# Patient Record
Sex: FEMALE | Race: WHITE | Employment: OTHER | URBAN - METROPOLITAN AREA
[De-identification: names, ages, dates, MRNs, and addresses within clinical notes are randomized per-mention and may not be internally consistent; named-entity substitution may affect disease eponyms.]

---

## 2018-03-29 ENCOUNTER — APPOINTMENT (OUTPATIENT)
Dept: GENERAL RADIOLOGY | Age: 68
End: 2018-03-29
Payer: MEDICARE

## 2018-03-29 ENCOUNTER — HOSPITAL ENCOUNTER (EMERGENCY)
Age: 68
Discharge: HOME | End: 2018-03-30
Attending: EMERGENCY MEDICINE
Payer: MEDICARE

## 2018-03-29 VITALS
HEIGHT: 64 IN | RESPIRATION RATE: 14 BRPM | BODY MASS INDEX: 34.15 KG/M2 | DIASTOLIC BLOOD PRESSURE: 64 MMHG | HEART RATE: 84 BPM | OXYGEN SATURATION: 99 % | WEIGHT: 200 LBS | TEMPERATURE: 97.9 F | SYSTOLIC BLOOD PRESSURE: 119 MMHG

## 2018-03-29 DIAGNOSIS — R07.81 RIB PAIN ON RIGHT SIDE: ICD-10-CM

## 2018-03-29 DIAGNOSIS — M54.50 ACUTE RIGHT-SIDED LOW BACK PAIN WITHOUT SCIATICA: Primary | ICD-10-CM
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
## 2018-03-29 PROCEDURE — 71046 X-RAY EXAM CHEST 2 VIEWS: CPT

## 2018-03-29 PROCEDURE — 99284 EMERGENCY DEPT VISIT MOD MDM: CPT

## 2018-03-29 PROCEDURE — 6370000000 HC RX 637 (ALT 250 FOR IP): Performed by: NURSE PRACTITIONER

## 2018-03-29 RX ORDER — METHOCARBAMOL 500 MG/1
1000 TABLET, FILM COATED ORAL ONCE
Status: COMPLETED | OUTPATIENT
Start: 2018-03-29 | End: 2018-03-29

## 2018-03-29 RX ORDER — IBUPROFEN 600 MG/1
600 TABLET ORAL ONCE
Status: COMPLETED | OUTPATIENT
Start: 2018-03-29 | End: 2018-03-29

## 2018-03-29 RX ADMIN — METHOCARBAMOL 1000 MG: 500 TABLET ORAL at 23:35

## 2018-03-29 RX ADMIN — IBUPROFEN 600 MG: 600 TABLET ORAL at 23:12

## 2018-03-29 ASSESSMENT — PAIN DESCRIPTION - LOCATION: LOCATION: BACK

## 2018-03-29 ASSESSMENT — PAIN DESCRIPTION - ORIENTATION: ORIENTATION: RIGHT

## 2018-03-29 ASSESSMENT — PAIN SCALES - GENERAL: PAINLEVEL_OUTOF10: 10

## 2018-03-30 RX ORDER — METHOCARBAMOL 500 MG/1
500 TABLET, FILM COATED ORAL 3 TIMES DAILY
Qty: 15 TABLET | Refills: 0 | Status: SHIPPED | OUTPATIENT
Start: 2018-03-30 | End: 2018-04-04

## 2018-03-30 ASSESSMENT — PAIN DESCRIPTION - PAIN TYPE: TYPE: ACUTE PAIN

## 2018-03-30 ASSESSMENT — PAIN DESCRIPTION - ORIENTATION: ORIENTATION: RIGHT;LOWER

## 2018-03-30 ASSESSMENT — PAIN DESCRIPTION - LOCATION: LOCATION: BACK

## 2018-03-30 ASSESSMENT — PAIN DESCRIPTION - DESCRIPTORS: DESCRIPTORS: ACHING;NAGGING

## 2018-03-30 ASSESSMENT — PAIN SCALES - GENERAL: PAINLEVEL_OUTOF10: 4

## 2019-03-10 ENCOUNTER — HOSPITAL ENCOUNTER (EMERGENCY)
Age: 69
Discharge: HOME | End: 2019-03-10
Payer: MEDICARE

## 2019-03-10 VITALS
HEART RATE: 85 BPM | OXYGEN SATURATION: 97 % | DIASTOLIC BLOOD PRESSURE: 75 MMHG | WEIGHT: 195 LBS | TEMPERATURE: 98.1 F | BODY MASS INDEX: 33.29 KG/M2 | RESPIRATION RATE: 14 BRPM | SYSTOLIC BLOOD PRESSURE: 141 MMHG | HEIGHT: 64 IN
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
## 2019-03-10 PROCEDURE — 90715 TDAP VACCINE 7 YRS/> IM: CPT | Performed by: PHYSICIAN ASSISTANT

## 2019-03-10 PROCEDURE — 90471 IMMUNIZATION ADMIN: CPT | Performed by: PHYSICIAN ASSISTANT

## 2019-03-10 PROCEDURE — 99282 EMERGENCY DEPT VISIT SF MDM: CPT

## 2019-03-10 PROCEDURE — 6360000002 HC RX W HCPCS: Performed by: PHYSICIAN ASSISTANT

## 2019-03-10 RX ORDER — DIAPER,BRIEF,INFANT-TODD,DISP
EACH MISCELLANEOUS ONCE
Status: DISCONTINUED | OUTPATIENT
Start: 2019-03-10 | End: 2019-03-10 | Stop reason: HOSPADM

## 2019-03-10 RX ORDER — AMOXICILLIN AND CLAVULANATE POTASSIUM 875; 125 MG/1; MG/1
1 TABLET, FILM COATED ORAL 2 TIMES DAILY
Qty: 14 TABLET | Refills: 0 | Status: SHIPPED | OUTPATIENT
Start: 2019-03-10 | End: 2019-03-17

## 2019-03-10 RX ORDER — BACITRACIN ZINC AND POLYMYXIN B SULFATE 500; 1000 [USP'U]/G; [USP'U]/G
OINTMENT TOPICAL
Qty: 30 G | Refills: 0 | Status: SHIPPED | OUTPATIENT
Start: 2019-03-10 | End: 2019-03-17

## 2019-03-10 RX ADMIN — TETANUS TOXOID, REDUCED DIPHTHERIA TOXOID AND ACELLULAR PERTUSSIS VACCINE, ADSORBED 0.5 ML: 5; 2.5; 8; 8; 2.5 SUSPENSION INTRAMUSCULAR at 16:46

## 2019-05-14 ENCOUNTER — OFFICE VISIT (OUTPATIENT)
Dept: PODIATRY | Age: 69
End: 2019-05-14
Payer: MEDICARE

## 2019-05-14 VITALS
SYSTOLIC BLOOD PRESSURE: 125 MMHG | HEIGHT: 64 IN | TEMPERATURE: 96.3 F | WEIGHT: 205 LBS | DIASTOLIC BLOOD PRESSURE: 78 MMHG | BODY MASS INDEX: 35 KG/M2

## 2019-05-14 DIAGNOSIS — S86.309A PERONEAL TENDON INJURY, INITIAL ENCOUNTER: Primary | ICD-10-CM

## 2019-05-14 DIAGNOSIS — M79.671 PAIN IN RIGHT FOOT: ICD-10-CM

## 2019-05-14 PROCEDURE — 99213 OFFICE O/P EST LOW 20 MIN: CPT | Performed by: PODIATRIST

## 2019-05-14 PROCEDURE — L4360 PNEUMAT WALKING BOOT PRE CST: HCPCS | Performed by: PODIATRIST

## 2019-05-14 RX ORDER — PREDNISONE 10 MG/1
10 TABLET ORAL
Qty: 18 TABLET | Refills: 0 | Status: SHIPPED | OUTPATIENT
Start: 2019-05-14 | End: 2019-05-23

## 2019-05-14 NOTE — PROGRESS NOTES
19  Mariana Getting : 1950 Sex: female  Age: 77 y.o. Patient was referred by Hedy Abdul DO    Chief Complaint   Patient presents with    Foot Pain     right foot pain along fifth met       HPI: This patient is seen for right foot pain ongoing since January patient relates no trauma patient states the pain is there patient Shoots  on the outside of the foot patient has seen another podiatrist Marty May a cortisone shot with no relief    ROS:  Const: Denies constitutional symptoms  Musculo: Denies symptoms other than stated above  Skin: Denies symptoms other than stated above       Current Outpatient Medications:     predniSONE (DELTASONE) 10 MG tablet, Take 1 tablet by mouth 3 times daily (with meals) for 9 days 3 pills x 3 days  2pills a day x3  Days  1 pill  X 3 days, Disp: 18 tablet, Rfl: 0    aspirin 325 MG EC tablet, Take 325 mg by mouth daily, Disp: , Rfl:     Levothyroxine Sodium (SYNTHROID PO), Take 75 mg by mouth daily , Disp: , Rfl:     VERAPAMIL HCL PO, Take 80 mg by mouth , Disp: , Rfl:     PANTOPRAZOLE SODIUM PO, Take 40 mg by mouth , Disp: , Rfl:     SIMVASTATIN PO, Take 20 mg by mouth , Disp: , Rfl:   Allergies   Allergen Reactions    Erythromycin     Sulfa Antibiotics        Past Medical History:   Diagnosis Date    [facility]     Cerebral artery occlusion with cerebral infarction ([facility]     GERD (gastroesophageal reflux disease)     Hyperlipidemia     Hypertension     Thyroid disease      Past Surgical History:   Procedure Laterality Date    CAROTID ENDARTERECTOMY      HEMORRHOID SURGERY      HYSTERECTOMY      JOINT REPLACEMENT       No family history on file.   Social History     Socioeconomic History    Marital status:      Spouse name: Not on file    Number of children: Not on file    Years of education: Not on file    Highest education level: Not on file   Occupational History    Not on file   Social Needs    Financial resource strain: Not 2 weeks (around 5/28/2019). Cam Walker  Signed informed consent was obtained from the patient. Patient applied the cam walker to the affected limb. This device fit well. Patient was given written and verbal instructions regarding this cam walker. This is medically necessary to help reduce pain and promote and expedite healing. Patient placed in a Cam Walker for 3 weeks to patient prescribed prednisone for 9 days patient is to reappoint in 3 weeks patient reduce all activities  Seen By:  Fransico Reddy DPM      Document was created using voice recognition software. Note was reviewed, however may contain grammatical errors.

## 2019-05-30 ENCOUNTER — OFFICE VISIT (OUTPATIENT)
Dept: PODIATRY | Age: 69
End: 2019-05-30
Payer: MEDICARE

## 2019-05-30 VITALS
DIASTOLIC BLOOD PRESSURE: 78 MMHG | WEIGHT: 199 LBS | BODY MASS INDEX: 33.97 KG/M2 | TEMPERATURE: 97.5 F | SYSTOLIC BLOOD PRESSURE: 124 MMHG | HEIGHT: 64 IN

## 2019-05-30 DIAGNOSIS — M76.71 PERONEAL TENDINITIS OF RIGHT LOWER EXTREMITY: Primary | ICD-10-CM

## 2019-05-30 PROCEDURE — 99212 OFFICE O/P EST SF 10 MIN: CPT | Performed by: PODIATRIST

## 2019-05-30 NOTE — PROGRESS NOTES
19  Johnny Wu : 1950 Sex: female  Age: 77 y.o. Patient was referred by Jez Diaz DO    Chief Complaint   Patient presents with    Foot Pain     f/u perineal tendon right foot in cam walker since last visit 2019 she is doing great       HPI: Patient is seen for follow-up of right foot patient has been walking boot for approximately 2 weeks patient is having no difficulty of pain. ROS:  Const: Denies constitutional symptoms  Musculo: Denies symptoms other than stated above  Skin: Denies symptoms other than stated above       Current Outpatient Medications:     aspirin 325 MG EC tablet, Take 325 mg by mouth daily, Disp: , Rfl:     Levothyroxine Sodium (SYNTHROID PO), Take 75 mg by mouth daily , Disp: , Rfl:     VERAPAMIL HCL PO, Take 80 mg by mouth , Disp: , Rfl:     PANTOPRAZOLE SODIUM PO, Take 40 mg by mouth , Disp: , Rfl:     SIMVASTATIN PO, Take 20 mg by mouth , Disp: , Rfl:   Allergies   Allergen Reactions    Erythromycin     Sulfa Antibiotics        Past Medical History:   Diagnosis Date    [facility]     [facility]erebral [facility] occlusion [facility] infarction [facility]     GERD (gastroesophageal reflux disease)     Hyperlipidemia     Hypertension     Thyroid disease      Past Surgical History:   Procedure Laterality Date    CAROTID ENDARTERECTOMY      HEMORRHOID SURGERY      HYSTERECTOMY      JOINT REPLACEMENT       No family history on file.   Social History     Socioeconomic History    Marital status:      Spouse name: Not on file    Number of children: Not on file    Years of education: Not on file    Highest education level: Not on file   Occupational History    Not on file   Social Needs    Financial resource strain: Not on file    Food insecurity:     Worry: Not on file     Inability: Not on file    Transportation needs:     Medical: Not on file     Non-medical: Not on file   Tobacco Use    Smoking status: Former Smoker    Smokeless tobacco: Never Used   Substance and Sexual Activity    Alcohol use: Yes    Drug use: No    Sexual activity: Not on file   Lifestyle    Physical activity:     Days per week: Not on file     Minutes per session: Not on file    Stress: Not on file   Relationships    Social connections:     Talks on phone: Not on file     Gets together: Not on file     Attends Oriental orthodox service: Not on file     Active member of club or organization: Not on file     Attends meetings of clubs or organizations: Not on file     Relationship status: Not on file    Intimate partner violence:     Fear of current or ex partner: Not on file     Emotionally abused: Not on file     Physically abused: Not on file     Forced sexual activity: Not on file   Other Topics Concern    Not on file   Social History Narrative    Not on file       Vitals:    05/30/19 1515   BP: 124/78   Temp: 97.5 °F (36.4 °C)   TempSrc: Tympanic   Weight: 199 lb (90.3 kg)   Height: 5' 4\" (1.626 m)       Focused Lower Extremity Physical Exam:  Vitals:    05/30/19 1515   BP: 124/78   Temp: 97.5 °F (36.4 °C)        Foot Exam    General  General Appearance: appears stated age and healthy   Orientation: alert and oriented to person, place, and time       Right Foot/Ankle     Inspection and Palpation  Tenderness: none   Swelling: none   Arch: normal             Ortho Exam    Vascular: pulses  dp  pt    Capillary Refill Time:   Hair growth  Skin:    Edema:    Neurologic:      Musculoskeletal/ Orthopedic examination:  There is negative pain with palpation range of motion inversion eversion dorsiflexion plantar flexion or ambulation of the right foot peroneal tendons or 5th metatarsal  NAIL   Web space   Derm:          Assessment and Plan:  Charo Bazan was seen today for foot pain. Diagnoses and all orders for this visit:    Peroneal tendinitis of right lower extremity        Return if symptoms worsen or fail to improve.     Patient is to continue with ambulating a good

## 2019-08-06 ENCOUNTER — OFFICE VISIT (OUTPATIENT)
Dept: FAMILY MEDICINE CLINIC | Age: 69
End: 2019-08-06
Payer: MEDICARE

## 2019-08-06 VITALS
WEIGHT: 188.13 LBS | HEIGHT: 64 IN | TEMPERATURE: 97.7 F | DIASTOLIC BLOOD PRESSURE: 86 MMHG | BODY MASS INDEX: 32.12 KG/M2 | OXYGEN SATURATION: 99 % | SYSTOLIC BLOOD PRESSURE: 138 MMHG | HEART RATE: 70 BPM

## 2019-08-06 DIAGNOSIS — R73.01 IMPAIRED FASTING BLOOD SUGAR: ICD-10-CM

## 2019-08-06 DIAGNOSIS — K21.9 GASTROESOPHAGEAL REFLUX DISEASE WITHOUT ESOPHAGITIS: ICD-10-CM

## 2019-08-06 DIAGNOSIS — E78.2 MIXED HYPERLIPIDEMIA: Primary | ICD-10-CM

## 2019-08-06 DIAGNOSIS — E03.9 ACQUIRED HYPOTHYROIDISM: ICD-10-CM

## 2019-08-06 DIAGNOSIS — E55.9 VITAMIN D DEFICIENCY: ICD-10-CM

## 2019-08-06 DIAGNOSIS — G44.211 INTRACTABLE EPISODIC TENSION-TYPE HEADACHE: ICD-10-CM

## 2019-08-06 PROBLEM — R51.9 HEADACHE: Status: ACTIVE | Noted: 2019-08-06

## 2019-08-06 PROCEDURE — 99214 OFFICE O/P EST MOD 30 MIN: CPT | Performed by: FAMILY MEDICINE

## 2019-08-06 RX ORDER — LEVOTHYROXINE SODIUM 0.07 MG/1
75 TABLET ORAL DAILY
Qty: 90 TABLET | Refills: 0 | Status: SHIPPED | OUTPATIENT
Start: 2019-08-06 | End: 2019-09-25 | Stop reason: SDUPTHER

## 2019-08-06 RX ORDER — METHYLPREDNISOLONE 4 MG/1
TABLET ORAL
Qty: 21 TABLET | Refills: 2 | Status: SHIPPED | OUTPATIENT
Start: 2019-08-06 | End: 2019-08-07 | Stop reason: SDUPTHER

## 2019-08-06 RX ORDER — TRIAMCINOLONE ACETONIDE 1 MG/G
CREAM TOPICAL
COMMUNITY
Start: 2019-07-23 | End: 2020-07-22

## 2019-08-06 SDOH — HEALTH STABILITY: MENTAL HEALTH: HOW OFTEN DO YOU HAVE A DRINK CONTAINING ALCOHOL?: 2-4 TIMES A MONTH

## 2019-08-06 SDOH — HEALTH STABILITY: MENTAL HEALTH: HOW MANY STANDARD DRINKS CONTAINING ALCOHOL DO YOU HAVE ON A TYPICAL DAY?: 1 OR 2

## 2019-08-06 ASSESSMENT — ENCOUNTER SYMPTOMS
SORE THROAT: 0
CONSTIPATION: 0
COUGH: 0
SINUS PAIN: 0
NAUSEA: 0
EYE PAIN: 0
CHEST TIGHTNESS: 0
VOMITING: 0
ABDOMINAL PAIN: 0
SHORTNESS OF BREATH: 0
WHEEZING: 0
RHINORRHEA: 1
DIARRHEA: 0
SINUS PRESSURE: 1
TROUBLE SWALLOWING: 0
BACK PAIN: 0

## 2019-08-06 ASSESSMENT — PATIENT HEALTH QUESTIONNAIRE - PHQ9
SUM OF ALL RESPONSES TO PHQ9 QUESTIONS 1 & 2: 0
SUM OF ALL RESPONSES TO PHQ QUESTIONS 1-9: 0
SUM OF ALL RESPONSES TO PHQ QUESTIONS 1-9: 0
1. LITTLE INTEREST OR PLEASURE IN DOING THINGS: 0
2. FEELING DOWN, DEPRESSED OR HOPELESS: 0

## 2019-08-06 NOTE — PROGRESS NOTES
19    Name: Denae Villareal  [facility]   Sex:female   Age:68 y.o. Chief Complaint   Patient presents with    Headache     for the past 5 days     Patient here for headaches    She did not go to the ER last week, the headaches has been a litle manageable  Always starts in the back of her head  She has seen headache specialist, got occipital injections in the past but would like to see someone locally if there is someone else that does them    Prednisone gets rid of then usually with in 48 to 72 hrs at the latest    Sometimes they start in paranasal sinuses but never really has a true sinus infection    Review of Systems   Constitutional: Negative for appetite change, fatigue and fever. HENT: Positive for rhinorrhea and sinus pressure. Negative for congestion, ear pain, sinus pain, sore throat and trouble swallowing. Eyes: Negative for pain. Respiratory: Negative for cough, chest tightness, shortness of breath and wheezing. Cardiovascular: Negative for chest pain, palpitations and leg swelling. Gastrointestinal: Negative for abdominal pain, constipation, diarrhea, nausea and vomiting. Endocrine: Negative for cold intolerance and heat intolerance. Genitourinary: Negative for difficulty urinating, hematuria and pelvic pain. Musculoskeletal: Negative for back pain, gait problem and joint swelling. Skin: Negative for rash and wound. Neurological: Positive for numbness and headaches. Negative for dizziness and syncope. Hematological: Negative for adenopathy. Psychiatric/Behavioral: Negative for confusion, sleep disturbance and suicidal ideas.            Current Outpatient Medications:     Calcium Carb-Cholecalciferol (CALCIUM-VITAMIN D) 500-200 MG-UNIT per tablet, Take 1 tablet by mouth 2 times daily (with meals), Disp: , Rfl:     levothyroxine (SYNTHROID) 75 MCG tablet, Take 1 tablet by mouth daily, Disp: 90 tablet, Rfl: 0    methylPREDNISolone (MEDROL DOSEPACK) 4 MG tablet, Take by mouth as directed in package, Disp: 21 tablet, Rfl: 2    triamcinolone (KENALOG) 0.1 % cream, , Disp: , Rfl:     aspirin 325 MG EC tablet, Take 325 mg by mouth daily, Disp: , Rfl:     VERAPAMIL HCL PO, Take 80 mg by mouth , Disp: , Rfl:     PANTOPRAZOLE SODIUM PO, Take 40 mg by mouth , Disp: , Rfl:     SIMVASTATIN PO, Take 20 mg by mouth , Disp: , Rfl:   Allergies   Allergen Reactions    Erythromycin     Sulfa Antibiotics       Past Medical History:   Diagnosis Date    Aneurysm ([address]     Aneurysm ([address]     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, with stent in left internal  carotid artery    Cerebral artery occlusion with cerebral infarction ([address]     GERD (gastroesophageal reflux disease)     Headache     Hyperlipidemia     Hypertension     Hypothyroidism     Osteoporosis     Thyroid disease      Patient Active Problem List    Diagnosis Date Noted    Headache 08/06/2019    Hypothyroidism 08/06/2019    GERD (gastroesophageal reflux disease) 08/06/2019    Peroneal tendinitis of right lower extremity 05/30/2019    Pain in right foot 05/14/2019      Past Surgical History:   Procedure Laterality Date    CAROTID ENDARTERECTOMY      HEMORRHOID SURGERY      HYSTERECTOMY      JOINT REPLACEMENT      KNEE ARTHROPLASTY Bilateral 2010    left sided stroke afterward, residual rt side weakness      Social History     Tobacco History     Smoking Status  Former Smoker Quit date  1/1/1981 Smoking Frequency  0.75 packs/day for 10 years (7.5 pk yrs) Smoking Tobacco Type  Cigarettes    Smokeless Tobacco Use  Never Used          Alcohol History     Alcohol Use Status  Yes          Drug Use     Drug Use Status  No          Sexual Activity     Sexually Active  Not Asked                  /86   Pulse 70   Temp 97.7 °F (36.5 °C)   Ht 5' 4\" (1.626 m)   Wt 188 lb 2 oz (85.3 kg)   SpO2 99%   BMI 32.29 kg/m²     EXAM:   Physical Exam   Constitutional: She appears well-developed and well-nourished.    HENT:   Head: Normocephalic

## 2019-08-07 DIAGNOSIS — G44.211 INTRACTABLE EPISODIC TENSION-TYPE HEADACHE: ICD-10-CM

## 2019-08-07 RX ORDER — METHYLPREDNISOLONE 4 MG/1
TABLET ORAL
Qty: 21 TABLET | Refills: 2 | Status: SHIPPED | OUTPATIENT
Start: 2019-08-07 | End: 2019-08-13

## 2019-09-20 ENCOUNTER — HOSPITAL ENCOUNTER (OUTPATIENT)
Age: 69
Discharge: HOME | End: 2019-09-22
Payer: MEDICARE

## 2019-09-20 DIAGNOSIS — E55.9 VITAMIN D DEFICIENCY: ICD-10-CM

## 2019-09-20 DIAGNOSIS — E78.2 MIXED HYPERLIPIDEMIA: ICD-10-CM

## 2019-09-20 DIAGNOSIS — R73.01 IMPAIRED FASTING BLOOD SUGAR: ICD-10-CM

## 2019-09-20 DIAGNOSIS — E03.9 ACQUIRED HYPOTHYROIDISM: ICD-10-CM

## 2019-09-20 LAB
ALBUMIN SERPL-MCNC: 3.9 G/DL (ref 3.5–5.2)
ALP BLD-CCNC: 80 U/L (ref 35–104)
ALT SERPL-CCNC: 16 U/L (ref 0–32)
ANION GAP SERPL CALCULATED.3IONS-SCNC: 13 MMOL/L (ref 7–16)
AST SERPL-CCNC: 20 U/L (ref 0–31)
BASOPHILS ABSOLUTE: 0.02 E9/L (ref 0–0.2)
BASOPHILS RELATIVE PERCENT: 0.5 % (ref 0–2)
BILIRUB SERPL-MCNC: 0.4 MG/DL (ref 0–1.2)
BUN BLDV-MCNC: 19 MG/DL (ref 8–23)
CALCIUM SERPL-MCNC: 9.3 MG/DL (ref 8.6–10.2)
CHLORIDE BLD-SCNC: 106 MMOL/L (ref 98–107)
CHOLESTEROL, TOTAL: 145 MG/DL (ref 0–199)
CO2: 25 MMOL/L (ref 22–29)
CREAT SERPL-MCNC: 0.8 MG/DL (ref 0.5–1)
EOSINOPHILS ABSOLUTE: 0.08 E9/L (ref 0.05–0.5)
EOSINOPHILS RELATIVE PERCENT: 2.1 % (ref 0–6)
GFR AFRICAN AMERICAN: >60
GFR NON-AFRICAN AMERICAN: >60 ML/MIN/1.73
GLUCOSE BLD-MCNC: 98 MG/DL (ref 74–99)
HBA1C MFR BLD: 5.3 % (ref 4–5.6)
HCT VFR BLD CALC: 43.6 % (ref 34–48)
HDLC SERPL-MCNC: 52 MG/DL
HEMOGLOBIN: 13.8 G/DL (ref 11.5–15.5)
IMMATURE GRANULOCYTES #: 0.01 E9/L
IMMATURE GRANULOCYTES %: 0.3 % (ref 0–5)
LDL CHOLESTEROL CALCULATED: 82 MG/DL (ref 0–99)
LYMPHOCYTES ABSOLUTE: 1.49 E9/L (ref 1.5–4)
LYMPHOCYTES RELATIVE PERCENT: 38.3 % (ref 20–42)
MCH RBC QN AUTO: 30.4 PG (ref 26–35)
MCHC RBC AUTO-ENTMCNC: 31.7 % (ref 32–34.5)
MCV RBC AUTO: 96 FL (ref 80–99.9)
MONOCYTES ABSOLUTE: 0.28 E9/L (ref 0.1–0.95)
MONOCYTES RELATIVE PERCENT: 7.2 % (ref 2–12)
NEUTROPHILS ABSOLUTE: 2.01 E9/L (ref 1.8–7.3)
NEUTROPHILS RELATIVE PERCENT: 51.6 % (ref 43–80)
PDW BLD-RTO: 13.4 FL (ref 11.5–15)
PLATELET # BLD: 159 E9/L (ref 130–450)
PMV BLD AUTO: 11.4 FL (ref 7–12)
POTASSIUM SERPL-SCNC: 4.4 MMOL/L (ref 3.5–5)
RBC # BLD: 4.54 E12/L (ref 3.5–5.5)
SODIUM BLD-SCNC: 144 MMOL/L (ref 132–146)
T4 FREE: 1.28 NG/DL (ref 0.93–1.7)
TOTAL PROTEIN: 6.2 G/DL (ref 6.4–8.3)
TRIGL SERPL-MCNC: 56 MG/DL (ref 0–149)
TSH SERPL DL<=0.05 MIU/L-ACNC: 2.6 UIU/ML (ref 0.27–4.2)
VITAMIN D 25-HYDROXY: 23 NG/ML (ref 30–100)
VLDLC SERPL CALC-MCNC: 11 MG/DL
WBC # BLD: 3.9 E9/L (ref 4.5–11.5)

## 2019-09-20 PROCEDURE — 83036 HEMOGLOBIN GLYCOSYLATED A1C: CPT

## 2019-09-20 PROCEDURE — 84439 ASSAY OF FREE THYROXINE: CPT

## 2019-09-20 PROCEDURE — 82306 VITAMIN D 25 HYDROXY: CPT

## 2019-09-20 PROCEDURE — 84443 ASSAY THYROID STIM HORMONE: CPT

## 2019-09-20 PROCEDURE — 80053 COMPREHEN METABOLIC PANEL: CPT

## 2019-09-20 PROCEDURE — 36415 COLL VENOUS BLD VENIPUNCTURE: CPT

## 2019-09-20 PROCEDURE — 85025 COMPLETE CBC W/AUTO DIFF WBC: CPT

## 2019-09-20 PROCEDURE — 80061 LIPID PANEL: CPT

## 2019-09-25 ENCOUNTER — OFFICE VISIT (OUTPATIENT)
Dept: FAMILY MEDICINE CLINIC | Age: 69
End: 2019-09-25
Payer: MEDICARE

## 2019-09-25 VITALS
TEMPERATURE: 97.4 F | HEIGHT: 64 IN | HEART RATE: 84 BPM | OXYGEN SATURATION: 99 % | DIASTOLIC BLOOD PRESSURE: 86 MMHG | BODY MASS INDEX: 31.27 KG/M2 | SYSTOLIC BLOOD PRESSURE: 126 MMHG | WEIGHT: 183.13 LBS

## 2019-09-25 DIAGNOSIS — I10 ESSENTIAL HYPERTENSION: Primary | ICD-10-CM

## 2019-09-25 DIAGNOSIS — E03.9 ACQUIRED HYPOTHYROIDISM: ICD-10-CM

## 2019-09-25 DIAGNOSIS — E78.2 MIXED HYPERLIPIDEMIA: ICD-10-CM

## 2019-09-25 DIAGNOSIS — Z23 IMMUNIZATION DUE: ICD-10-CM

## 2019-09-25 DIAGNOSIS — M75.21 BICEPS TENDINITIS OF RIGHT UPPER EXTREMITY: ICD-10-CM

## 2019-09-25 DIAGNOSIS — K21.9 GASTROESOPHAGEAL REFLUX DISEASE WITHOUT ESOPHAGITIS: ICD-10-CM

## 2019-09-25 DIAGNOSIS — R73.01 IMPAIRED FASTING BLOOD SUGAR: ICD-10-CM

## 2019-09-25 PROBLEM — E78.5 HYPERLIPIDEMIA: Status: ACTIVE | Noted: 2019-09-25

## 2019-09-25 PROCEDURE — G0008 ADMIN INFLUENZA VIRUS VAC: HCPCS | Performed by: FAMILY MEDICINE

## 2019-09-25 PROCEDURE — 99214 OFFICE O/P EST MOD 30 MIN: CPT | Performed by: FAMILY MEDICINE

## 2019-09-25 PROCEDURE — 90653 IIV ADJUVANT VACCINE IM: CPT | Performed by: FAMILY MEDICINE

## 2019-09-25 RX ORDER — LEVOTHYROXINE SODIUM 0.07 MG/1
75 TABLET ORAL DAILY
Qty: 90 TABLET | Refills: 1 | Status: SHIPPED | OUTPATIENT
Start: 2019-09-25 | End: 2020-01-27 | Stop reason: SDUPTHER

## 2019-09-25 RX ORDER — SIMVASTATIN 20 MG
20 TABLET ORAL DAILY
Qty: 90 TABLET | Refills: 1 | Status: SHIPPED
Start: 2019-09-25 | End: 2020-04-06 | Stop reason: SDUPTHER

## 2019-09-25 RX ORDER — VERAPAMIL HYDROCHLORIDE 80 MG/1
80 TABLET ORAL DAILY
COMMUNITY
End: 2019-09-25 | Stop reason: SDUPTHER

## 2019-09-25 RX ORDER — VERAPAMIL HYDROCHLORIDE 80 MG/1
80 TABLET ORAL DAILY
Qty: 90 TABLET | Refills: 1 | Status: SHIPPED | OUTPATIENT
Start: 2019-09-25 | End: 2019-09-25 | Stop reason: SDUPTHER

## 2019-09-25 RX ORDER — PANTOPRAZOLE SODIUM 40 MG/1
40 TABLET, DELAYED RELEASE ORAL DAILY
Qty: 90 TABLET | Refills: 1 | Status: SHIPPED
Start: 2019-09-25 | End: 2020-04-06 | Stop reason: SDUPTHER

## 2019-09-25 RX ORDER — PANTOPRAZOLE SODIUM 40 MG/1
1 TABLET, DELAYED RELEASE ORAL DAILY
COMMUNITY
Start: 2019-08-11 | End: 2019-09-25 | Stop reason: SDUPTHER

## 2019-09-25 RX ORDER — SIMVASTATIN 20 MG
1 TABLET ORAL DAILY
COMMUNITY
Start: 2019-08-19 | End: 2019-09-25 | Stop reason: SDUPTHER

## 2019-09-25 RX ORDER — VERAPAMIL HYDROCHLORIDE 80 MG/1
80 TABLET ORAL 2 TIMES DAILY
Qty: 180 TABLET | Refills: 1 | Status: SHIPPED
Start: 2019-09-25 | End: 2020-07-22 | Stop reason: SDUPTHER

## 2019-09-25 RX ORDER — PREDNISONE 10 MG/1
TABLET ORAL
Qty: 30 TABLET | Refills: 0 | Status: SHIPPED | OUTPATIENT
Start: 2019-09-25 | End: 2019-10-09

## 2019-09-25 ASSESSMENT — ENCOUNTER SYMPTOMS
SINUS PAIN: 0
CONSTIPATION: 0
ABDOMINAL PAIN: 0
VOMITING: 0
SHORTNESS OF BREATH: 0
SORE THROAT: 0
WHEEZING: 0
DIARRHEA: 0
TROUBLE SWALLOWING: 0
COUGH: 0
NAUSEA: 0
CHEST TIGHTNESS: 0
BACK PAIN: 0
EYE PAIN: 0

## 2019-10-09 ENCOUNTER — OFFICE VISIT (OUTPATIENT)
Dept: FAMILY MEDICINE CLINIC | Age: 69
End: 2019-10-09
Payer: MEDICARE

## 2019-10-09 VITALS
SYSTOLIC BLOOD PRESSURE: 122 MMHG | BODY MASS INDEX: 32.69 KG/M2 | TEMPERATURE: 98.7 F | HEIGHT: 64 IN | DIASTOLIC BLOOD PRESSURE: 74 MMHG | HEART RATE: 66 BPM | OXYGEN SATURATION: 98 % | WEIGHT: 191.5 LBS

## 2019-10-09 DIAGNOSIS — M79.601 RIGHT ARM PAIN: Primary | ICD-10-CM

## 2019-10-09 DIAGNOSIS — M65.221 CALCIFIC TENDINITIS, RIGHT UPPER ARM: ICD-10-CM

## 2019-10-09 PROCEDURE — 99213 OFFICE O/P EST LOW 20 MIN: CPT | Performed by: FAMILY MEDICINE

## 2019-10-09 ASSESSMENT — ENCOUNTER SYMPTOMS
CHEST TIGHTNESS: 0
VOMITING: 0
COUGH: 0
TROUBLE SWALLOWING: 0
SHORTNESS OF BREATH: 0
SINUS PAIN: 0
BACK PAIN: 0
EYE PAIN: 0
SORE THROAT: 0
DIARRHEA: 0
NAUSEA: 0
ABDOMINAL PAIN: 0
WHEEZING: 0
CONSTIPATION: 0

## 2019-10-29 ENCOUNTER — NURSE ONLY (OUTPATIENT)
Dept: FAMILY MEDICINE CLINIC | Age: 69
End: 2019-10-29
Payer: MEDICARE

## 2019-10-29 DIAGNOSIS — Z23 NEED FOR VACCINATION: Primary | ICD-10-CM

## 2019-10-29 PROCEDURE — G0009 ADMIN PNEUMOCOCCAL VACCINE: HCPCS | Performed by: FAMILY MEDICINE

## 2019-10-29 PROCEDURE — 90670 PCV13 VACCINE IM: CPT | Performed by: FAMILY MEDICINE

## 2019-12-03 ENCOUNTER — OFFICE VISIT (OUTPATIENT)
Dept: ORTHOPEDIC SURGERY | Age: 69
End: 2019-12-03
Payer: MEDICARE

## 2019-12-03 VITALS
HEART RATE: 91 BPM | SYSTOLIC BLOOD PRESSURE: 128 MMHG | HEIGHT: 64 IN | DIASTOLIC BLOOD PRESSURE: 82 MMHG | WEIGHT: 179 LBS | BODY MASS INDEX: 30.56 KG/M2

## 2019-12-03 DIAGNOSIS — M79.601 RIGHT ARM PAIN: Primary | ICD-10-CM

## 2019-12-03 PROCEDURE — 99203 OFFICE O/P NEW LOW 30 MIN: CPT | Performed by: ORTHOPAEDIC SURGERY

## 2020-01-27 RX ORDER — LEVOTHYROXINE SODIUM 0.07 MG/1
75 TABLET ORAL DAILY
Qty: 90 TABLET | Refills: 0 | Status: SHIPPED
Start: 2020-01-27 | End: 2020-04-06 | Stop reason: SDUPTHER

## 2020-03-19 PROBLEM — E55.9 VITAMIN D DEFICIENCY: Status: ACTIVE | Noted: 2020-03-19

## 2020-04-02 ENCOUNTER — TELEPHONE (OUTPATIENT)
Dept: FAMILY MEDICINE CLINIC | Age: 70
End: 2020-04-02

## 2020-04-06 RX ORDER — SIMVASTATIN 20 MG
20 TABLET ORAL DAILY
Qty: 90 TABLET | Refills: 0 | Status: SHIPPED
Start: 2020-04-06 | End: 2020-07-22 | Stop reason: SDUPTHER

## 2020-04-06 RX ORDER — LEVOTHYROXINE SODIUM 0.07 MG/1
75 TABLET ORAL DAILY
Qty: 90 TABLET | Refills: 0 | Status: SHIPPED
Start: 2020-04-06 | End: 2020-07-22 | Stop reason: SDUPTHER

## 2020-04-06 RX ORDER — PANTOPRAZOLE SODIUM 40 MG/1
40 TABLET, DELAYED RELEASE ORAL DAILY
Qty: 90 TABLET | Refills: 0 | Status: SHIPPED
Start: 2020-04-06 | End: 2020-07-22 | Stop reason: SDUPTHER

## 2020-05-07 ENCOUNTER — TELEPHONE (OUTPATIENT)
Dept: PRIMARY CARE CLINIC | Age: 70
End: 2020-05-07

## 2020-06-02 ENCOUNTER — TELEPHONE (OUTPATIENT)
Dept: FAMILY MEDICINE CLINIC | Age: 70
End: 2020-06-02

## 2020-06-02 RX ORDER — PREDNISONE 20 MG/1
20 TABLET ORAL 2 TIMES DAILY
Qty: 10 TABLET | Refills: 0 | Status: SHIPPED
Start: 2020-06-02 | End: 2021-03-11 | Stop reason: SDUPTHER

## 2020-06-19 RX ORDER — DIVALPROEX SODIUM 250 MG/1
TABLET, DELAYED RELEASE ORAL
COMMUNITY
Start: 2020-06-10 | End: 2020-07-22

## 2020-07-22 ENCOUNTER — HOSPITAL ENCOUNTER (OUTPATIENT)
Age: 70
Discharge: HOME | End: 2020-07-24
Payer: MEDICARE

## 2020-07-22 ENCOUNTER — OFFICE VISIT (OUTPATIENT)
Dept: FAMILY MEDICINE CLINIC | Age: 70
End: 2020-07-22
Payer: MEDICARE

## 2020-07-22 VITALS
OXYGEN SATURATION: 98 % | SYSTOLIC BLOOD PRESSURE: 118 MMHG | TEMPERATURE: 97.8 F | HEART RATE: 74 BPM | DIASTOLIC BLOOD PRESSURE: 78 MMHG | BODY MASS INDEX: 32.64 KG/M2 | HEIGHT: 64 IN | WEIGHT: 191.2 LBS

## 2020-07-22 LAB
ALBUMIN SERPL-MCNC: 4.4 G/DL (ref 3.5–5.2)
ALP BLD-CCNC: 82 U/L (ref 35–104)
ALT SERPL-CCNC: 13 U/L (ref 0–32)
ANION GAP SERPL CALCULATED.3IONS-SCNC: 12 MMOL/L (ref 7–16)
AST SERPL-CCNC: 19 U/L (ref 0–31)
BASOPHILS ABSOLUTE: 0.04 E9/L (ref 0–0.2)
BASOPHILS RELATIVE PERCENT: 1 % (ref 0–2)
BILIRUB SERPL-MCNC: 0.5 MG/DL (ref 0–1.2)
BUN BLDV-MCNC: 18 MG/DL (ref 8–23)
CALCIUM SERPL-MCNC: 9.8 MG/DL (ref 8.6–10.2)
CHLORIDE BLD-SCNC: 104 MMOL/L (ref 98–107)
CHOLESTEROL, TOTAL: 152 MG/DL (ref 0–199)
CO2: 27 MMOL/L (ref 22–29)
CREAT SERPL-MCNC: 0.8 MG/DL (ref 0.5–1)
EOSINOPHILS ABSOLUTE: 0.06 E9/L (ref 0.05–0.5)
EOSINOPHILS RELATIVE PERCENT: 1.5 % (ref 0–6)
GFR AFRICAN AMERICAN: >60
GFR NON-AFRICAN AMERICAN: >60 ML/MIN/1.73
GLUCOSE BLD-MCNC: 101 MG/DL (ref 74–99)
HBA1C MFR BLD: 5.4 % (ref 4–5.6)
HCT VFR BLD CALC: 45.6 % (ref 34–48)
HDLC SERPL-MCNC: 61 MG/DL
HEMOGLOBIN: 14.2 G/DL (ref 11.5–15.5)
IMMATURE GRANULOCYTES #: 0.01 E9/L
IMMATURE GRANULOCYTES %: 0.2 % (ref 0–5)
LDL CHOLESTEROL CALCULATED: 77 MG/DL (ref 0–99)
LYMPHOCYTES ABSOLUTE: 1.72 E9/L (ref 1.5–4)
LYMPHOCYTES RELATIVE PERCENT: 42.3 % (ref 20–42)
MCH RBC QN AUTO: 29.2 PG (ref 26–35)
MCHC RBC AUTO-ENTMCNC: 31.1 % (ref 32–34.5)
MCV RBC AUTO: 93.8 FL (ref 80–99.9)
MONOCYTES ABSOLUTE: 0.32 E9/L (ref 0.1–0.95)
MONOCYTES RELATIVE PERCENT: 7.9 % (ref 2–12)
NEUTROPHILS ABSOLUTE: 1.92 E9/L (ref 1.8–7.3)
NEUTROPHILS RELATIVE PERCENT: 47.1 % (ref 43–80)
PDW BLD-RTO: 13 FL (ref 11.5–15)
PLATELET # BLD: 101 E9/L (ref 130–450)
PMV BLD AUTO: 11.1 FL (ref 7–12)
POTASSIUM SERPL-SCNC: 4.3 MMOL/L (ref 3.5–5)
RBC # BLD: 4.86 E12/L (ref 3.5–5.5)
SODIUM BLD-SCNC: 143 MMOL/L (ref 132–146)
T4 FREE: 1.21 NG/DL (ref 0.93–1.7)
TOTAL PROTEIN: 6.7 G/DL (ref 6.4–8.3)
TRIGL SERPL-MCNC: 72 MG/DL (ref 0–149)
TSH SERPL DL<=0.05 MIU/L-ACNC: 3.45 UIU/ML (ref 0.27–4.2)
VLDLC SERPL CALC-MCNC: 14 MG/DL
WBC # BLD: 4.1 E9/L (ref 4.5–11.5)

## 2020-07-22 PROCEDURE — G8510 SCR DEP NEG, NO PLAN REQD: HCPCS | Performed by: FAMILY MEDICINE

## 2020-07-22 PROCEDURE — 80061 LIPID PANEL: CPT

## 2020-07-22 PROCEDURE — 36415 COLL VENOUS BLD VENIPUNCTURE: CPT

## 2020-07-22 PROCEDURE — 83036 HEMOGLOBIN GLYCOSYLATED A1C: CPT

## 2020-07-22 PROCEDURE — 80053 COMPREHEN METABOLIC PANEL: CPT

## 2020-07-22 PROCEDURE — 84443 ASSAY THYROID STIM HORMONE: CPT

## 2020-07-22 PROCEDURE — 85025 COMPLETE CBC W/AUTO DIFF WBC: CPT

## 2020-07-22 PROCEDURE — 99214 OFFICE O/P EST MOD 30 MIN: CPT | Performed by: FAMILY MEDICINE

## 2020-07-22 PROCEDURE — 84439 ASSAY OF FREE THYROXINE: CPT

## 2020-07-22 RX ORDER — PANTOPRAZOLE SODIUM 40 MG/1
40 TABLET, DELAYED RELEASE ORAL DAILY
Qty: 90 TABLET | Refills: 0 | Status: SHIPPED
Start: 2020-07-22 | End: 2020-09-28 | Stop reason: SDUPTHER

## 2020-07-22 RX ORDER — SIMVASTATIN 20 MG
20 TABLET ORAL DAILY
Qty: 90 TABLET | Refills: 0 | Status: SHIPPED
Start: 2020-07-22 | End: 2020-09-28 | Stop reason: SDUPTHER

## 2020-07-22 RX ORDER — VERAPAMIL HYDROCHLORIDE 80 MG/1
80 TABLET ORAL 2 TIMES DAILY
Qty: 180 TABLET | Refills: 1 | Status: SHIPPED
Start: 2020-07-22 | End: 2020-12-04 | Stop reason: SDUPTHER

## 2020-07-22 RX ORDER — LEVOTHYROXINE SODIUM 0.07 MG/1
75 TABLET ORAL DAILY
Qty: 90 TABLET | Refills: 0 | Status: SHIPPED
Start: 2020-07-22 | End: 2020-09-28 | Stop reason: SDUPTHER

## 2020-07-22 ASSESSMENT — ENCOUNTER SYMPTOMS
EYE PAIN: 0
CHEST TIGHTNESS: 0
SHORTNESS OF BREATH: 0
NAUSEA: 0
SINUS PAIN: 0
WHEEZING: 0
VOMITING: 0
TROUBLE SWALLOWING: 0
ABDOMINAL PAIN: 0
BACK PAIN: 0
DIARRHEA: 0
CONSTIPATION: 0
SORE THROAT: 0
COUGH: 0

## 2020-07-22 ASSESSMENT — PATIENT HEALTH QUESTIONNAIRE - PHQ9
2. FEELING DOWN, DEPRESSED OR HOPELESS: 0
1. LITTLE INTEREST OR PLEASURE IN DOING THINGS: 0
SUM OF ALL RESPONSES TO PHQ QUESTIONS 1-9: 0
SUM OF ALL RESPONSES TO PHQ9 QUESTIONS 1 & 2: 0
SUM OF ALL RESPONSES TO PHQ QUESTIONS 1-9: 0

## 2020-07-22 NOTE — PROGRESS NOTES
7/22/20    Name: Niyah Jensen  RUCHI:[id number]   Sex:female   Age:69 y.o. Chief Complaint   Patient presents with    Hypothyroidism    Hyperlipidemia     Patient presents to office for visit. She denies any issues. She just had her labs done today. Patient needs refills. She has been social distancing and staying home through out the pandemic. Patient here for check up    No current problems  didlabs this morning    HTN stable  No changes  Home readings hav beenr eally good    Hypothyroidism  Labs done will await results    Hyperlipidemia  Stable with meds  Refills needed  Labs done today    Mammogram done dense breast but no new abnormalities seen  Continue with yearly screening        Review of Systems   Constitutional: Negative for appetite change, fatigue and fever. HENT: Negative for congestion, ear pain, sinus pain, sore throat and trouble swallowing. Eyes: Negative for pain. Respiratory: Negative for cough, chest tightness, shortness of breath and wheezing. Cardiovascular: Negative for chest pain, palpitations and leg swelling. Gastrointestinal: Negative for abdominal pain, constipation, diarrhea, nausea and vomiting. Endocrine: Negative for cold intolerance and heat intolerance. Genitourinary: Negative for difficulty urinating, dysuria, frequency, hematuria, pelvic pain and urgency. Musculoskeletal: Negative for arthralgias, back pain, gait problem, joint swelling and myalgias. Skin: Negative for rash and wound. Neurological: Negative for dizziness, syncope, light-headedness and headaches. Hematological: Negative for adenopathy. Psychiatric/Behavioral: Negative for confusion, dysphoric mood, self-injury, sleep disturbance and suicidal ideas. The patient is not nervous/anxious.             Current Outpatient Medications:     verapamil (CALAN) 80 MG tablet, Take 1 tablet by mouth 2 times daily, Disp: 180 tablet, Rfl: 1    simvastatin (ZOCOR) 20 MG tablet, Take 1 tablet by mouth daily, Disp: 90 tablet, Rfl: 0    pantoprazole (PROTONIX) 40 MG tablet, Take 1 tablet by mouth daily, Disp: 90 tablet, Rfl: 0    levothyroxine (SYNTHROID) 75 MCG tablet, Take 1 tablet by mouth daily, Disp: 90 tablet, Rfl: 0    Calcium Citrate (CITRACAL PO), Take by mouth, Disp: , Rfl:     aspirin 325 MG EC tablet, Take 325 mg by mouth daily, Disp: , Rfl:   Allergies   Allergen Reactions    Erythromycin     Sulfa Antibiotics       Past Medical History:   Diagnosis Date    Aneurysm ([address]     Aneurysm ([address]     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, with stent in left internal  carotid artery    Cerebral artery occlusion with cerebral infarction ([address]     GERD (gastroesophageal reflux disease)     Headache     Hyperlipidemia     Hypertension     Hypothyroidism     Osteoporosis     Thyroid disease      Patient Active Problem List    Diagnosis Date Noted    Vitamin D deficiency 03/19/2020    Hyperlipidemia 09/25/2019    Headache 08/06/2019    Hypothyroidism 08/06/2019    GERD (gastroesophageal reflux disease) 08/06/2019    Peroneal tendinitis of right lower extremity 05/30/2019    Pain in right foot 05/14/2019    Essential hypertension 11/14/2016    Cerebral aneurysm, nonruptured 05/21/2009      Past Surgical History:   Procedure Laterality Date    CAROTID ENDARTERECTOMY      HEMORRHOID SURGERY      HYSTERECTOMY      JOINT REPLACEMENT Bilateral     knee    KNEE ARTHROPLASTY Bilateral 2010    left sided stroke afterward, residual rt side weakness      Social History     Tobacco History     Smoking Status  Former Smoker Smoking Start Date  1/1/1971 Quit date  1/1/1981 Smoking Frequency  1 pack/day for 10 years (10 pk yrs)    Smoking Tobacco Type  Cigarettes    Smokeless Tobacco Use  Never Used          Alcohol History     Alcohol Use Status  Yes          Drug Use     Drug Use Status  No          Sexual Activity     Sexually Active  Not Currently Partners  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 Constitutional:       Appearance: Normal appearance. She is well-developed. HENT:      Head: Normocephalic and atraumatic. Right Ear: Tympanic membrane normal.      Left Ear: Tympanic membrane normal.      Nose: Nose normal.      Mouth/Throat:      Mouth: Mucous membranes are moist.   Eyes:      Pupils: Pupils are equal, round, and reactive to light. Neck:      Musculoskeletal: Normal range of motion. Cardiovascular:      Rate and Rhythm: Normal rate and regular rhythm. Pulmonary:      Effort: Pulmonary effort is normal.      Breath sounds: Normal breath sounds. Abdominal:      General: Bowel sounds are normal.      Palpations: Abdomen is soft. Musculoskeletal:      Comments: Gait  normal in the office today   Skin:     General: Skin is warm and dry. Neurological:      General: No focal deficit present. Mental Status: She is alert and oriented to person, place, and time. Psychiatric:         Mood and Affect: Mood normal.         Thought Content: Thought content normal.          Darline De Dios was seen today for hypothyroidism and hyperlipidemia. Diagnoses and all orders for this visit:    Essential hypertension  Comments:  home readings good  no changes  Orders:  -     verapamil (CALAN) 80 MG tablet; Take 1 tablet by mouth 2 times daily    Mixed hyperlipidemia  Comments:  continue simvastatin  no chnages  labs done today  Orders:  -     simvastatin (ZOCOR) 20 MG tablet; Take 1 tablet by mouth daily    Gastroesophageal reflux disease without esophagitis  Comments:  with diet changes has been great  pantoprazoel working with no issues  Orders:  -     pantoprazole (PROTONIX) 40 MG tablet;  Take 1 tablet by mouth daily    Acquired hypothyroidism  Comments:  stable  Orders:  -     levothyroxine (SYNTHROID) 75 MCG tablet; Take 1 tablet by mouth daily        I independently reviewed and updated the chief complaint, HPI, past medical and surgical history, medications, allergies and ROS as entered by the LPN. Seen by:   Ted Mujica DO

## 2020-09-10 ENCOUNTER — TELEPHONE (OUTPATIENT)
Dept: FAMILY MEDICINE CLINIC | Age: 70
End: 2020-09-10

## 2020-09-10 ENCOUNTER — OFFICE VISIT (OUTPATIENT)
Dept: FAMILY MEDICINE CLINIC | Age: 70
End: 2020-09-10
Payer: MEDICARE

## 2020-09-10 VITALS
WEIGHT: 194 LBS | DIASTOLIC BLOOD PRESSURE: 82 MMHG | BODY MASS INDEX: 33.12 KG/M2 | OXYGEN SATURATION: 98 % | HEIGHT: 64 IN | SYSTOLIC BLOOD PRESSURE: 140 MMHG | TEMPERATURE: 97.4 F | HEART RATE: 76 BPM

## 2020-09-10 PROCEDURE — 99213 OFFICE O/P EST LOW 20 MIN: CPT | Performed by: INTERNAL MEDICINE

## 2020-09-11 NOTE — TELEPHONE ENCOUNTER
No foreign body noted. Osteopenia was noted. Should discuss this with her PCP. Follow-up with Dr. Jeremiah Russ next week.

## 2020-09-11 NOTE — PROGRESS NOTES
[id number] [facility] [email or web address]     9/10/20  Aayush Peoples : 1950 Sex: female  Age: 68 y.o. Chief Complaint   Patient presents with    Foot Pain     right foot; painful; pt states she feels like it is bruised       HPI    Patient presents to express care today complaining of plantar surface foot pain over the last 4 days. States she feels like there may be something in it. Does not recall injury or stepping on anything. Denies any redness or drainage to the area. Denies fever or chills. States she is up-to-date with her tetanus. Review of Systems   Constitutional: Negative for chills and fever. Musculoskeletal:        Right foot pain-see above   Skin:        States puffiness soreness plantar surface right foot   Neurological: Negative for weakness and numbness. REST OF PERTINENT ROS GONE OVER AND WAS NEGATIVE.              Current Outpatient Medications:     verapamil (CALAN) 80 MG tablet, Take 1 tablet by mouth 2 times daily, Disp: 180 tablet, Rfl: 1    simvastatin (ZOCOR) 20 MG tablet, Take 1 tablet by mouth daily, Disp: 90 tablet, Rfl: 0    pantoprazole (PROTONIX) 40 MG tablet, Take 1 tablet by mouth daily, Disp: 90 tablet, Rfl: 0    levothyroxine (SYNTHROID) 75 MCG tablet, Take 1 tablet by mouth daily, Disp: 90 tablet, Rfl: 0    Calcium Citrate (CITRACAL PO), Take by mouth, Disp: , Rfl:     aspirin 325 MG EC tablet, Take 325 mg by mouth daily, Disp: , Rfl:   Allergies   Allergen Reactions    Erythromycin     Sulfa Antibiotics        Past Medical History:   Diagnosis Date    [facility]neurysm ([facility]     [facility]m ([facility]     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, with stent in left internal  carotid artery    Cerebral artery occlusion with cerebral infarction ([facility]     GERD (gastroesophageal reflux disease)     Headache     Hyperlipidemia     Hypertension     Hypothyroidism     Osteoporosis     Thyroid disease      Past Surgical History:   Procedure Laterality Date    CAROTID ENDARTERECTOMY      TempSrc: Temporal   SpO2: 98%   Weight: 194 lb (88 kg)   Height: 5' 4\" (1.626 m)       Physical Exam  Constitutional:       General: She is not in acute distress. Cardiovascular:      Pulses: Normal pulses. Comments: to feet and ankle area  Musculoskeletal: Normal range of motion. General: Tenderness present. No swelling or deformity. Skin:     General: Skin is warm and dry. Comments: There was a black dot darkened area the plantar surface of right foot. After informed consent I did clean the area topically numb it and probed gently with 25-gauge needle and pickups after transilluminating the area to see if there was any foreign body noted. I did feel we got a very tiny sliver of black object out although she still complained of sensation of something there. Did ask Dr. Kandice Ocasio if you would be so kind is to check and he did examine the area and shave down with the blade. Patient did end up feeling some better. X-ray was obtained of that foot not demonstrating any foreign body. Neurological:      Mental Status: She is alert. Assessment and Plan:  Ulises Briceno was seen today for foot pain. Diagnoses and all orders for this visit:    Foreign body (FB) in soft tissue  -     XR FOOT RIGHT (MIN 3 VIEWS); Future      Plan: See above body report. She is going to see Dr. Kandice Ocasio next week. X-ray reviewed. Did show rather extensive osteopenia and she will need to discuss this with her PCP when she sees her. I elected not to start antibiotic at this time. Watch for signs of infection. Area cleaned topical antibiotic and Band-Aid. Notify us with problems in the interim. No follow-ups on file. Seen By:  Rashida Ramon MD      *Document was created using voice recognition software. Note was reviewed however may contain grammatical errors.

## 2020-09-15 ENCOUNTER — OFFICE VISIT (OUTPATIENT)
Dept: PODIATRY | Age: 70
End: 2020-09-15
Payer: MEDICARE

## 2020-09-15 VITALS — BODY MASS INDEX: 33.12 KG/M2 | WEIGHT: 194 LBS | HEIGHT: 64 IN

## 2020-09-15 PROCEDURE — 99203 OFFICE O/P NEW LOW 30 MIN: CPT | Performed by: PODIATRIST

## 2020-09-15 NOTE — PROGRESS NOTES
Reactions    Erythromycin     Sulfa Antibiotics        Past Medical History:   Diagnosis Date    A[address]m ([address]     Aneurysm ([address]     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, with stent in left internal  carotid artery    Cerebral artery occlusion with cerebral infarction ([address]     GERD (gastroesophageal reflux disease)     Headache     Hyperlipidemia     Hypertension     Hypothyroidism     Osteoporosis     Thyroid disease            Vitals:    09/15/20 0945   Weight: 194 lb (88 kg)   Height: 5' 4\" (1.626 m)       Work History/Social History:     Focused Lower Extremity Physical Exam:    Neurovascular examination:    Dorsalis Pedis palpable bilateral.  Posterior tibialis palpable bilateral.    Capillary Refill Time:  Immediate return  Hair growth:  Symmetrical and bilateral   Skin:  Not atrophic  Edema: No edema bilateral feet or ankles. Neurologic:  Light touch intact bilateral.      Musculoskeletal/ Orthopedic examination:    Equinis: Absent bilateral  Dorsiflexion, plantarflexion, inversion, eversion bilateral 5 out of 5 muscle strength  Wiggling toes  Negative Homans  No pain to palpation right foot    Dermatology examination:    No open skin lesions or abrasions bilateral lower extremity. Immediate capillary refill time all digits  No open skin lesions or abrasions. Assessment and Plan:  Gutierrez Shaw was seen today for foot pain. Diagnoses and all orders for this visit:    Corns and callosities    Pain in right foot    Foreign body in right foot, subsequent encounter    Bilateral cold feet      Patient seen follow-up likely foreign body right foot. No open skin lesions or abrasions right foot. Radiographs reviewed once again with patient today. No foreign body on radiograph. No open wounds patient progressing very well she is pain-free. I still did recommend continued use of well supported walking shoes avoid barefoot.     I did recommend low-dose compression stockings to be worn during the day and removed at night as she does have some cold intolerance on all of her toes which she has had for several months. I did discuss vascular surgery consultation if continued symptoms. I will follow-up 1 month to monitor overall progression. Return in about 1 month (around 10/15/2020). Seen By:  Kevin Payne DPM      Document was created using voice recognition software. Note was reviewed, however may contain grammatical errors.

## 2020-09-28 RX ORDER — SIMVASTATIN 20 MG
20 TABLET ORAL DAILY
Qty: 90 TABLET | Refills: 0 | Status: SHIPPED
Start: 2020-09-28 | End: 2020-12-04

## 2020-09-28 RX ORDER — PANTOPRAZOLE SODIUM 40 MG/1
40 TABLET, DELAYED RELEASE ORAL DAILY
Qty: 90 TABLET | Refills: 0 | Status: SHIPPED
Start: 2020-09-28 | End: 2020-12-04 | Stop reason: SDUPTHER

## 2020-09-28 RX ORDER — LEVOTHYROXINE SODIUM 0.07 MG/1
75 TABLET ORAL DAILY
Qty: 90 TABLET | Refills: 0 | Status: SHIPPED
Start: 2020-09-28 | End: 2020-12-04 | Stop reason: SDUPTHER

## 2020-10-20 ENCOUNTER — NURSE ONLY (OUTPATIENT)
Dept: FAMILY MEDICINE CLINIC | Age: 70
End: 2020-10-20
Payer: MEDICARE

## 2020-10-20 PROCEDURE — G0008 ADMIN INFLUENZA VIRUS VAC: HCPCS | Performed by: FAMILY MEDICINE

## 2020-10-20 PROCEDURE — 90694 VACC AIIV4 NO PRSRV 0.5ML IM: CPT | Performed by: FAMILY MEDICINE

## 2020-11-13 ENCOUNTER — TELEPHONE (OUTPATIENT)
Dept: FAMILY MEDICINE CLINIC | Age: 70
End: 2020-11-13

## 2020-12-02 DIAGNOSIS — E78.2 MIXED HYPERLIPIDEMIA: ICD-10-CM

## 2020-12-02 DIAGNOSIS — E03.9 ACQUIRED HYPOTHYROIDISM: ICD-10-CM

## 2020-12-02 LAB
ALBUMIN SERPL-MCNC: 4.4 G/DL (ref 3.5–5.2)
ALP BLD-CCNC: 77 U/L (ref 35–104)
ALT SERPL-CCNC: 12 U/L (ref 0–32)
ANION GAP SERPL CALCULATED.3IONS-SCNC: 16 MMOL/L (ref 7–16)
AST SERPL-CCNC: 17 U/L (ref 0–31)
BASOPHILS ABSOLUTE: 0.02 E9/L (ref 0–0.2)
BASOPHILS RELATIVE PERCENT: 0.5 % (ref 0–2)
BILIRUB SERPL-MCNC: 0.4 MG/DL (ref 0–1.2)
BUN BLDV-MCNC: 21 MG/DL (ref 8–23)
CALCIUM SERPL-MCNC: 9.5 MG/DL (ref 8.6–10.2)
CHLORIDE BLD-SCNC: 105 MMOL/L (ref 98–107)
CHOLESTEROL, TOTAL: 202 MG/DL (ref 0–199)
CO2: 23 MMOL/L (ref 22–29)
CREAT SERPL-MCNC: 0.7 MG/DL (ref 0.5–1)
EOSINOPHILS ABSOLUTE: 0.1 E9/L (ref 0.05–0.5)
EOSINOPHILS RELATIVE PERCENT: 2.6 % (ref 0–6)
GFR AFRICAN AMERICAN: >60
GFR NON-AFRICAN AMERICAN: >60 ML/MIN/1.73
GLUCOSE BLD-MCNC: 88 MG/DL (ref 74–99)
HCT VFR BLD CALC: 43.2 % (ref 34–48)
HDLC SERPL-MCNC: 59 MG/DL
HEMOGLOBIN: 13.9 G/DL (ref 11.5–15.5)
IMMATURE GRANULOCYTES #: 0.01 E9/L
IMMATURE GRANULOCYTES %: 0.3 % (ref 0–5)
LDL CHOLESTEROL CALCULATED: 128 MG/DL (ref 0–99)
LYMPHOCYTES ABSOLUTE: 1.31 E9/L (ref 1.5–4)
LYMPHOCYTES RELATIVE PERCENT: 34.6 % (ref 20–42)
MCH RBC QN AUTO: 29.7 PG (ref 26–35)
MCHC RBC AUTO-ENTMCNC: 32.2 % (ref 32–34.5)
MCV RBC AUTO: 92.3 FL (ref 80–99.9)
MONOCYTES ABSOLUTE: 0.31 E9/L (ref 0.1–0.95)
MONOCYTES RELATIVE PERCENT: 8.2 % (ref 2–12)
NEUTROPHILS ABSOLUTE: 2.04 E9/L (ref 1.8–7.3)
NEUTROPHILS RELATIVE PERCENT: 53.8 % (ref 43–80)
PDW BLD-RTO: 12.8 FL (ref 11.5–15)
PLATELET # BLD: 143 E9/L (ref 130–450)
PMV BLD AUTO: 10.4 FL (ref 7–12)
POTASSIUM SERPL-SCNC: 4.2 MMOL/L (ref 3.5–5)
RBC # BLD: 4.68 E12/L (ref 3.5–5.5)
SODIUM BLD-SCNC: 144 MMOL/L (ref 132–146)
T4 FREE: 1.18 NG/DL (ref 0.93–1.7)
TOTAL PROTEIN: 6.6 G/DL (ref 6.4–8.3)
TRIGL SERPL-MCNC: 76 MG/DL (ref 0–149)
TSH SERPL DL<=0.05 MIU/L-ACNC: 1.94 UIU/ML (ref 0.27–4.2)
VLDLC SERPL CALC-MCNC: 15 MG/DL
WBC # BLD: 3.8 E9/L (ref 4.5–11.5)

## 2020-12-04 ENCOUNTER — OFFICE VISIT (OUTPATIENT)
Dept: FAMILY MEDICINE CLINIC | Age: 70
End: 2020-12-04
Payer: MEDICARE

## 2020-12-04 VITALS
WEIGHT: 182 LBS | TEMPERATURE: 98.1 F | HEIGHT: 64 IN | DIASTOLIC BLOOD PRESSURE: 82 MMHG | HEART RATE: 88 BPM | OXYGEN SATURATION: 98 % | BODY MASS INDEX: 31.07 KG/M2 | SYSTOLIC BLOOD PRESSURE: 136 MMHG

## 2020-12-04 VITALS
HEART RATE: 88 BPM | HEIGHT: 64 IN | OXYGEN SATURATION: 98 % | TEMPERATURE: 98.1 F | SYSTOLIC BLOOD PRESSURE: 136 MMHG | BODY MASS INDEX: 31.09 KG/M2 | WEIGHT: 182.1 LBS | DIASTOLIC BLOOD PRESSURE: 82 MMHG

## 2020-12-04 PROCEDURE — 99214 OFFICE O/P EST MOD 30 MIN: CPT | Performed by: FAMILY MEDICINE

## 2020-12-04 PROCEDURE — G0439 PPPS, SUBSEQ VISIT: HCPCS | Performed by: FAMILY MEDICINE

## 2020-12-04 PROCEDURE — 93000 ELECTROCARDIOGRAM COMPLETE: CPT | Performed by: FAMILY MEDICINE

## 2020-12-04 RX ORDER — LEVOTHYROXINE SODIUM 0.07 MG/1
75 TABLET ORAL DAILY
Qty: 90 TABLET | Refills: 1 | Status: SHIPPED
Start: 2020-12-04 | End: 2021-06-02 | Stop reason: SDUPTHER

## 2020-12-04 RX ORDER — VERAPAMIL HYDROCHLORIDE 120 MG/1
120 TABLET, FILM COATED ORAL 2 TIMES DAILY
Qty: 180 TABLET | Refills: 1 | Status: SHIPPED
Start: 2020-12-04 | End: 2021-06-02 | Stop reason: SDUPTHER

## 2020-12-04 RX ORDER — PANTOPRAZOLE SODIUM 40 MG/1
40 TABLET, DELAYED RELEASE ORAL DAILY
Qty: 90 TABLET | Refills: 1 | Status: SHIPPED
Start: 2020-12-04 | End: 2021-06-02 | Stop reason: SDUPTHER

## 2020-12-04 RX ORDER — ROSUVASTATIN CALCIUM 20 MG/1
20 TABLET, COATED ORAL DAILY
Qty: 30 TABLET | Refills: 1 | Status: SHIPPED
Start: 2020-12-04 | End: 2021-06-02 | Stop reason: SDUPTHER

## 2020-12-04 ASSESSMENT — ENCOUNTER SYMPTOMS
COUGH: 0
ABDOMINAL PAIN: 0
TROUBLE SWALLOWING: 0
NAUSEA: 0
DIARRHEA: 0
SINUS PAIN: 0
VOMITING: 0
WHEEZING: 0
CONSTIPATION: 0
CHEST TIGHTNESS: 0
BACK PAIN: 0
SORE THROAT: 0
SHORTNESS OF BREATH: 0
EYE PAIN: 0

## 2020-12-04 ASSESSMENT — PATIENT HEALTH QUESTIONNAIRE - PHQ9
2. FEELING DOWN, DEPRESSED OR HOPELESS: 0
SUM OF ALL RESPONSES TO PHQ9 QUESTIONS 1 & 2: 0
SUM OF ALL RESPONSES TO PHQ QUESTIONS 1-9: 0
SUM OF ALL RESPONSES TO PHQ QUESTIONS 1-9: 0
1. LITTLE INTEREST OR PLEASURE IN DOING THINGS: 0
SUM OF ALL RESPONSES TO PHQ QUESTIONS 1-9: 0

## 2020-12-04 ASSESSMENT — LIFESTYLE VARIABLES
HOW OFTEN DURING THE LAST YEAR HAVE YOU NEEDED AN ALCOHOLIC DRINK FIRST THING IN THE MORNING TO GET YOURSELF GOING AFTER A NIGHT OF HEAVY DRINKING: NEVER
HOW MANY STANDARD DRINKS CONTAINING ALCOHOL DO YOU HAVE ON A TYPICAL DAY: 0
HOW OFTEN DURING THE LAST YEAR HAVE YOU HAD A FEELING OF GUILT OR REMORSE AFTER DRINKING: 0
HOW OFTEN DO YOU HAVE A DRINK CONTAINING ALCOHOL: TWO TO FOUR TIMES A MONTH
HOW OFTEN DURING THE LAST YEAR HAVE YOU NEEDED AN ALCOHOLIC DRINK FIRST THING IN THE MORNING TO GET YOURSELF GOING AFTER A NIGHT OF HEAVY DRINKING: 0
HOW OFTEN DO YOU HAVE SIX OR MORE DRINKS ON ONE OCCASION: 0
HAVE YOU OR SOMEONE ELSE BEEN INJURED AS A RESULT OF YOUR DRINKING: NO
HOW OFTEN DURING THE LAST YEAR HAVE YOU FOUND THAT YOU WERE NOT ABLE TO STOP DRINKING ONCE YOU HAD STARTED: 0
HOW OFTEN DO YOU HAVE SIX OR MORE DRINKS ON ONE OCCASION: NEVER
HOW OFTEN DO YOU HAVE A DRINK CONTAINING ALCOHOL: 2
HOW OFTEN DURING THE LAST YEAR HAVE YOU FAILED TO DO WHAT WAS NORMALLY EXPECTED FROM YOU BECAUSE OF DRINKING: NEVER
HAVE YOU OR SOMEONE ELSE BEEN INJURED AS A RESULT OF YOUR DRINKING: 0
HOW MANY STANDARD DRINKS CONTAINING ALCOHOL DO YOU HAVE ON A TYPICAL DAY: ONE OR TWO
HOW OFTEN DURING THE LAST YEAR HAVE YOU BEEN UNABLE TO REMEMBER WHAT HAPPENED THE NIGHT BEFORE BECAUSE YOU HAD BEEN DRINKING: 0
HOW OFTEN DURING THE LAST YEAR HAVE YOU HAD A FEELING OF GUILT OR REMORSE AFTER DRINKING: NEVER
HOW OFTEN DURING THE LAST YEAR HAVE YOU FAILED TO DO WHAT WAS NORMALLY EXPECTED FROM YOU BECAUSE OF DRINKING: 0
HOW OFTEN DURING THE LAST YEAR HAVE YOU FOUND THAT YOU WERE NOT ABLE TO STOP DRINKING ONCE YOU HAD STARTED: NEVER
AUDIT TOTAL SCORE: 2
AUDIT-C TOTAL SCORE: 0
AUDIT TOTAL SCORE: 0
HAS A RELATIVE, FRIEND, DOCTOR, OR ANOTHER HEALTH PROFESSIONAL EXPRESSED CONCERN ABOUT YOUR DRINKING OR SUGGESTED YOU CUT DOWN: NO
HOW OFTEN DURING THE LAST YEAR HAVE YOU BEEN UNABLE TO REMEMBER WHAT HAPPENED THE NIGHT BEFORE BECAUSE YOU HAD BEEN DRINKING: NEVER
AUDIT-C TOTAL SCORE: 2
HAS A RELATIVE, FRIEND, DOCTOR, OR ANOTHER HEALTH PROFESSIONAL EXPRESSED CONCERN ABOUT YOUR DRINKING OR SUGGESTED YOU CUT DOWN: 0

## 2020-12-04 NOTE — PROGRESS NOTES
Medicare Annual Wellness Visit  Name: Valeria Peña Date: 2020   MRN: [id number] Sex: Female   Age: 65 y.o. Ethnicity: Non-/Non    : 1950 Race: Kaleb Fernandez is here for Medicare AWV    Screenings for behavioral, psychosocial and functional/safety risks, and cognitive dysfunction are all negative except as indicated below. These results, as well as other patient data from the [address] form, are documented in Flowsheets linked to this Encounter. Allergies   Allergen Reactions    Erythromycin     Sulfa Antibiotics          Prior to Visit Medications    Medication Sig Taking? Authorizing Provider   levothyroxine (SYNTHROID) 75 MCG tablet Take 1 tablet by mouth daily  Shelly Mcdowell,    pantoprazole (PROTONIX) 40 MG tablet Take 1 tablet by mouth daily  Shelly Mcdowell DO   verapamil (CALAN) 120 MG tablet Take 1 tablet by mouth 2 times daily  Shelly Mcdowell, DO   rosuvastatin (CRESTOR) 20 MG tablet Take 1 tablet by mouth daily 2 times a week  Shelly Mcdowell,    Calcium Citrate (CITRACAL PO) Take by mouth  Historical Provider, MD   aspirin 325 MG EC tablet Take 325 mg by mouth daily  Historical Provider, MD         Past Medical History:   Diagnosis Date    Aneurysm ([address]     Aneurysm ([address]     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     CareTeam (Including outside providers/suppliers regularly involved in providing care):   Patient Care Team:  Amos Maldonado DO as PCP - General  Amos Maldonado DO as PCP - [facility]    Wt Readings from Last 3 Encounters:   12/04/20 182 lb 1.6 oz (82.6 kg)   12/04/20 182 lb (82.6 kg)   09/15/20 194 lb (88 kg)     Vitals:    12/04/20 1332   BP: 136/82   Pulse: 88   Temp: 98.1 °F (36.7 °C)   SpO2: 98%   Weight: 182 lb 1.6 oz (82.6 kg)   Height: 5' 4\" (1.626 m)     Body mass index is 31.26 kg/m². Based upon direct observation of the patient, evaluation of cognition reveals recent and remote memory intact. General Appearance: alert and oriented to person, place and time, well developed and well- nourished, in no acute distress  Skin: warm and dry, no rash or erythema  Head: normocephalic and atraumatic  Eyes: pupils equal, round, and reactive to light, extraocular eye movements intact, conjunctivae normal  ENT: tympanic membrane, external ear and ear canal normal bilaterally, nose without deformity, nasal mucosa and turbinates normal without polyps  Neck: supple and non-tender without mass, no thyromegaly or thyroid nodules, no cervical lymphadenopathy  Pulmonary/Chest: clear to auscultation bilaterally- no wheezes, rales or rhonchi, normal air movement, no respiratory distress  Cardiovascular: normal rate, regular rhythm, normal S1 and S2, no murmurs, rubs, clicks, or gallops, distal pulses intact, no carotid bruits  Abdomen: soft, non-tender, non-distended, normal bowel sounds, no masses or organomegaly  Extremities: no cyanosis, clubbing or edema  Musculoskeletal: normal range of motion, no joint swelling, deformity or tenderness  Neurologic: reflexes normal and symmetric, no cranial nerve deficit, gait, coordination and speech normal    Patient's complete Health Risk Assessment and screening values have been reviewed and are found in Flowsheets.  The following problems were reviewed today and where indicated follow up appointments were made and/or referrals ordered. Positive Risk Factor Screenings with Interventions:       General Health and ACP:  General  In general, how would you say your health is?: Very Good  In the past 7 days, have you experienced any of the following?  New or Increased Pain, New or Increased Fatigue, Loneliness, Social Isolation, Stress or Anger?: None of These  Do you get the social and emotional support that you need?: Yes  Do you have a Living Will?: Yes  Advance Directives     Power of  Living Will ACP-Advance Directive ACP-Power of     Not on File Not on File Filed [address] [facility] Risk Interventions:  · Pain issues: home exercises provided    Health Habits/Nutrition:  Health Habits/Nutrition  Do you exercise for at least 20 minutes 2-3 times per week?: (!) No  Have you lost any weight without trying in the past 3 months?: No  Do you eat fewer than 2 meals per day?: No  Have you seen a dentist within the past year?: Yes  Body mass index: (!) 31.25  Health Habits/Nutrition Interventions:  · Inadequate physical activity:  patient agrees to wear a pedometer and walk at least 10,000 steps/day    Hearing/Vision:  No exam data present  Hearing/Vision  Do you or your family notice any trouble with your hearing?: (!) Yes  Do you have difficulty driving, watching TV, or doing any of your daily activities because of your eyesight?: No  Have you had an eye exam within the past year?: Yes  Hearing/Vision Interventions:  · Hearing concerns:  patient declines any further evaluation/treatment for hearing issues    Safety:  Safety  Do you have working smoke detectors?: (!) No  Have all throw rugs been removed or fastened?: (!) No  Do you have non-slip mats or surfaces in all bathtubs/showers?: Yes  Do all of your stairways have a railing or banister?: Yes  Are your doorways, halls and stairs free of clutter?: Yes  Do you always fasten your seatbelt when you are in a car?: Yes  Safety Interventions:  · Home safety tips provided    Personalized Preventive Plan   Current Health Maintenance Status  Immunization History   Administered Date(s) Administered    Influenza, High Dose (Fluzone 65 yrs and older) 11/10/2013, 09/25/2018    Influenza, Quadv, adjuvanted, 65 yrs +, IM, PF (Fluad) 10/20/2020    Influenza, Triv, inactivated, subunit, adjuvanted, IM (Fluad 65 yrs and older) 09/25/2019    Pneumococcal Conjugate 13-valent ([id number]) 10/29/2019    Pneumococcal Polysaccharide ([id number]) 11/29/2012    Tdap (Boostrix, Adacel) 03/10/2019        Health Maintenance   Topic Date Due    Shingles Vaccine (1 of 2) 10/03/1998    DEXA (modify frequency per FRAX score)  10/03/2003    Annual Wellness Visit (AWV)  06/20/2019    Pneumococcal 65+ years Vaccine (2 of 2 - PPSV23) 10/29/2020    Lipid screen  12/02/2021    TSH testing  12/02/2021    Potassium monitoring  12/02/2021    Creatinine monitoring  12/02/2021    Breast cancer screen  07/09/2022    Colon cancer screen colonoscopy  02/26/2029    DTaP/Tdap/Td vaccine (2 - Td) 03/10/2029    Flu vaccine  Completed    Hepatitis A vaccine  Aged Out    Hepatitis B vaccine  Aged Out    Hib vaccine  Aged Out    Meningococcal (ACWY) vaccine  Aged Out    Hepatitis C screen  Discontinued     Recommendations for Accurate Group Due: see orders and patient instructions/AVS.  . Recommended screening schedule for the next 5-10 years is provided to the patient in written form: see Patient Instructions/AVS.    Micaela Dunne was seen today for medicare awv.     Diagnoses and all orders for this visit:    Routine general medical examination at a health care facility    Essential hypertension  Comments:  readings high, increase verapamil to 120mg bid    Mixed hyperlipidemia  Comments:  restart statin, rosuvastatin at 2x/week and increase if tolerates

## 2020-12-04 NOTE — PROGRESS NOTES
12/4/20    Name: Farhad April  KOD:[birth year]   Sex:female   Age:67 y.o. Chief Complaint   Patient presents with    Hypothyroidism    Hyperlipidemia     Patient presents to office for visit. She did have labs done. Patient stopped taking her Simvastatin on 10/22/2020 because she was having severe muscle cramps in her legs. She says the muscle cramps resolved after stopping. Patient asks if she is due for Dexa, her last one was June 2018, she would like to do it here in the office if so. Patient says on 11/1/2020 around 8 am she was sitting at her table drinking a cup of coffee. She says that all of a sudden her heart felt like it was racing and her head felt full. She says she ended up dropping her coffee mug and was unable to stand. Patient says this lasted for about 10 minutes. She says this has not happened since and this had never happened before. Patient says that 2 days prior to this incidence she tripped when getting the mail, she denies any head trauma or injury. Patient here for a check up    Blood pressure elevated  She has not been check  It regularly but has been elevated at home too  This is why she thinks she had that staring spell, her  said afterward that she was completley normal like nothing ever happened' and it never happended again  Will increase her calan today to 120mg bid  Low salt diet, more exercise    Hyperlipidemia  Also restart statin, rosuvastatin at twice a week and if tolerates increase slowly from there    Hypothyroidism stable  No changes  Labs in 6 months        Review of Systems   Constitutional: Negative for appetite change, fatigue and fever. HENT: Negative for congestion, ear pain, sinus pain, sore throat and trouble swallowing. Eyes: Negative for pain. Respiratory: Negative for cough, chest tightness, shortness of breath and wheezing. Cardiovascular: Negative for chest pain, palpitations and leg swelling.    Gastrointestinal: Negative for abdominal pain, constipation, diarrhea, nausea and vomiting. Endocrine: Negative for cold intolerance and heat intolerance. Genitourinary: Negative for difficulty urinating, dysuria, frequency, hematuria, pelvic pain and urgency. Musculoskeletal: Negative for arthralgias, back pain, gait problem, joint swelling and myalgias. Skin: Negative for rash and wound. Neurological: Negative for dizziness, syncope, light-headedness and headaches. Hematological: Negative for adenopathy. Psychiatric/Behavioral: Negative for confusion, dysphoric mood, self-injury, sleep disturbance and suicidal ideas. The patient is nervous/anxious.             Current Outpatient Medications:     levothyroxine (SYNTHROID) 75 MCG tablet, Take 1 tablet by mouth daily, Disp: 90 tablet, Rfl: 1    pantoprazole (PROTONIX) 40 MG tablet, Take 1 tablet by mouth daily, Disp: 90 tablet, Rfl: 1    verapamil (CALAN) 120 MG tablet, Take 1 tablet by mouth 2 times daily, Disp: 180 tablet, Rfl: 1    rosuvastatin (CRESTOR) 20 MG tablet, Take 1 tablet by mouth daily 2 times a week, Disp: 30 tablet, Rfl: 1    Calcium Citrate (CITRACAL PO), Take by mouth, Disp: , Rfl:     aspirin 325 MG EC tablet, Take 325 mg by mouth daily, Disp: , Rfl:   Allergies   Allergen Reactions    Erythromycin     Sulfa Antibiotics       Past Medical History:   Diagnosis Date    Aneurysm ([address]     Aneurysm ([address]     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, with stent in left internal  carotid artery    Cerebral artery occlusion with cerebral infarction ([address]     GERD (gastroesophageal reflux disease)     Headache     Hyperlipidemia     Hypertension     Hypothyroidism     Osteoporosis     Thyroid disease      Patient Active Problem List    Diagnosis Date Noted    Vitamin D deficiency 03/19/2020    Hyperlipidemia 09/25/2019    Headache 08/06/2019    Hypothyroidism 08/06/2019    GERD (gastroesophageal reflux disease) 08/06/2019    Peroneal tendinitis of right lower extremity 05/30/2019    Psychiatric:         Mood and Affect: Mood normal.         Thought Content: Thought content normal.          Lilo Johnson was seen today for hypothyroidism and hyperlipidemia. Diagnoses and all orders for this visit:    Essential hypertension  Comments:  home readings good  no changes  Orders:  -     verapamil (CALAN) 120 MG tablet; Take 1 tablet by mouth 2 times daily  -     EKG 12 Lead    Acquired hypothyroidism  Comments:  stable  labs reviwed no changes  Orders:  -     levothyroxine (SYNTHROID) 75 MCG tablet; Take 1 tablet by mouth daily    Gastroesophageal reflux disease without esophagitis  Comments:  with diet changes has been great  pantoprazoel working with no issues  Orders:  -     pantoprazole (PROTONIX) 40 MG tablet; Take 1 tablet by mouth daily    Postmenopausal osteoporosis  Comments:  needs dexa  Orders:  -     DEXA BONE DENSITY AXIAL SKELETON; Future    Other orders  -     rosuvastatin (CRESTOR) 20 MG tablet; Take 1 tablet by mouth daily 2 times a week    appt in 3 months to recheck  Blood pressures and tolerance of statin    I independently reviewed and updated the chief complaint, HPI, past medical and surgical history, medications, allergies and ROS as entered by the LPN. Seen by:   Wilber Cooks, DO

## 2020-12-04 NOTE — PATIENT INSTRUCTIONS
Personalized Preventive Plan for [facility] - 12/4/2020  Medicare offers a range of preventive health benefits. Some of the tests and screenings are paid in full while other may be subject to a deductible, co-insurance, and/or copay. Some of these benefits include a comprehensive review of your medical history including lifestyle, illnesses that may run in your family, and various assessments and screenings as appropriate. After reviewing your medical record and screening and assessments performed today your provider may have ordered immunizations, labs, imaging, and/or referrals for you. A list of these orders (if applicable) as well as your Preventive Care list are included within your After Visit Summary for your review. Other Preventive Recommendations:    · A preventive eye exam performed by an eye specialist is recommended every 1-2 years to screen for glaucoma; cataracts, macular degeneration, and other eye disorders. · A preventive dental visit is recommended every 6 months. · Try to get at least 150 minutes of exercise per week or 10,000 steps per day on a pedometer . · Order or download the FREE \"Exercise & Physical Activity: Your Everyday Guide\" from [employer]. Call 4-854.170.2465 or search [employer] online. · You need 8254-4064 mg of calcium and 8544-6171 IU of vitamin D per day. It is possible to meet your calcium requirement with diet alone, but a vitamin D supplement is usually necessary to meet this goal.  · When exposed to the sun, use a sunscreen that protects against both UVA and UVB radiation with an SPF of 30 or greater. Reapply every 2 to 3 hours or after sweating, drying off with a towel, or swimming. · Always wear a seat belt when traveling in a car. Always wear a helmet when riding a bicycle or motorcycle.

## 2021-03-03 ENCOUNTER — OFFICE VISIT (OUTPATIENT)
Dept: FAMILY MEDICINE CLINIC | Age: 71
End: 2021-03-03
Payer: MEDICARE

## 2021-03-03 VITALS
SYSTOLIC BLOOD PRESSURE: 130 MMHG | HEIGHT: 64 IN | DIASTOLIC BLOOD PRESSURE: 72 MMHG | HEART RATE: 78 BPM | TEMPERATURE: 98.2 F | OXYGEN SATURATION: 99 % | WEIGHT: 184 LBS | BODY MASS INDEX: 31.41 KG/M2

## 2021-03-03 DIAGNOSIS — R06.02 SHORTNESS OF BREATH: ICD-10-CM

## 2021-03-03 DIAGNOSIS — R73.01 IMPAIRED FASTING BLOOD SUGAR: ICD-10-CM

## 2021-03-03 DIAGNOSIS — E78.2 MIXED HYPERLIPIDEMIA: ICD-10-CM

## 2021-03-03 DIAGNOSIS — I10 ESSENTIAL HYPERTENSION: Primary | ICD-10-CM

## 2021-03-03 DIAGNOSIS — R07.89 OTHER CHEST PAIN: ICD-10-CM

## 2021-03-03 DIAGNOSIS — E03.9 ACQUIRED HYPOTHYROIDISM: ICD-10-CM

## 2021-03-03 DIAGNOSIS — I20.8 ANGINA AT REST (HCC): ICD-10-CM

## 2021-03-03 PROCEDURE — 99214 OFFICE O/P EST MOD 30 MIN: CPT | Performed by: FAMILY MEDICINE

## 2021-03-03 RX ORDER — FLUOCINONIDE 0.5 MG/G
OINTMENT TOPICAL AS NEEDED
COMMUNITY
Start: 2021-03-02

## 2021-03-03 RX ORDER — CLOBETASOL PROPIONATE 0.05 G/100ML
SHAMPOO TOPICAL
COMMUNITY
Start: 2021-03-02 | End: 2021-11-01

## 2021-03-03 ASSESSMENT — ENCOUNTER SYMPTOMS
ABDOMINAL PAIN: 0
CONSTIPATION: 0
NAUSEA: 0
SHORTNESS OF BREATH: 0
COUGH: 0
TROUBLE SWALLOWING: 0
BACK PAIN: 0
WHEEZING: 0
DIARRHEA: 0
EYE PAIN: 0
CHEST TIGHTNESS: 0
VOMITING: 0
SINUS PAIN: 0
SORE THROAT: 0

## 2021-03-03 ASSESSMENT — PATIENT HEALTH QUESTIONNAIRE - PHQ9
SUM OF ALL RESPONSES TO PHQ9 QUESTIONS 1 & 2: 0
SUM OF ALL RESPONSES TO PHQ QUESTIONS 1-9: 0
2. FEELING DOWN, DEPRESSED OR HOPELESS: 0

## 2021-03-11 RX ORDER — PREDNISONE 20 MG/1
20 TABLET ORAL 2 TIMES DAILY
Qty: 10 TABLET | Refills: 0 | Status: SHIPPED | OUTPATIENT
Start: 2021-03-11 | End: 2021-03-16

## 2021-03-11 NOTE — TELEPHONE ENCOUNTER
Hailee Rciardo calling will you [facility] prednisone [facility] ?   States you do this often for her

## 2021-04-05 ENCOUNTER — TELEPHONE (OUTPATIENT)
Dept: FAMILY MEDICINE CLINIC | Age: 71
End: 2021-04-05

## 2021-04-05 ENCOUNTER — TELEPHONE (OUTPATIENT)
Dept: CARDIOLOGY | Age: 71
End: 2021-04-05

## 2021-04-05 NOTE — TELEPHONE ENCOUNTER
Vianca calling she has not been contacted by [facility] cardiology to schedle stress test ordered in march. Per zaki at that office she left her a message today to schedule. Per bibi yes she will call her back now at [phone].

## 2021-04-06 ENCOUNTER — TELEPHONE (OUTPATIENT)
Dept: CARDIOLOGY | Age: 71
End: 2021-04-06

## 2021-04-06 NOTE — TELEPHONE ENCOUNTER
CALLED PATIENT TO SCHEDULE TREADMILL STRESS TEST FOR 04-26-21. NO AUTH NEEDED  REVIEWED COVID CHECKLIST WITH PATIENT.     Electronically signed by Marek Hernandez on 4/6/2021 at 11:53 AM

## 2021-04-22 ENCOUNTER — TELEPHONE (OUTPATIENT)
Dept: CARDIOLOGY | Age: 71
End: 2021-04-22

## 2021-04-22 NOTE — TELEPHONE ENCOUNTER
1423 04/22/21 Spoke with Douglas Collet Reminder Call for Regular  Stress test  04/26/21@ 0754  included Pre procedure stress instructions and COVID check list.  Reminded to not take Verapamil 48 hours prior to stress test and bring medication to restart post stress test.  She acknowledged understanding.

## 2021-04-26 ENCOUNTER — HOSPITAL ENCOUNTER (OUTPATIENT)
Dept: CARDIOLOGY | Age: 71
Discharge: HOME | End: 2021-04-26
Payer: MEDICARE

## 2021-04-26 VITALS
BODY MASS INDEX: 31.58 KG/M2 | HEART RATE: 72 BPM | HEIGHT: 64 IN | DIASTOLIC BLOOD PRESSURE: 68 MMHG | SYSTOLIC BLOOD PRESSURE: 132 MMHG | WEIGHT: 185 LBS

## 2021-04-26 DIAGNOSIS — R07.89 OTHER CHEST PAIN: ICD-10-CM

## 2021-04-26 DIAGNOSIS — R06.02 SHORTNESS OF BREATH: ICD-10-CM

## 2021-04-26 DIAGNOSIS — I20.8 ANGINA AT REST (HCC): ICD-10-CM

## 2021-04-26 DIAGNOSIS — I10 ESSENTIAL HYPERTENSION: ICD-10-CM

## 2021-04-26 PROCEDURE — 93017 CV STRESS TEST TRACING ONLY: CPT

## 2021-05-28 DIAGNOSIS — R73.01 IMPAIRED FASTING BLOOD SUGAR: ICD-10-CM

## 2021-05-28 DIAGNOSIS — I10 ESSENTIAL HYPERTENSION: ICD-10-CM

## 2021-05-28 DIAGNOSIS — E78.2 MIXED HYPERLIPIDEMIA: ICD-10-CM

## 2021-05-28 DIAGNOSIS — E03.9 ACQUIRED HYPOTHYROIDISM: ICD-10-CM

## 2021-05-28 LAB
ALBUMIN SERPL-MCNC: 4.1 G/DL (ref 3.5–5.2)
ALP BLD-CCNC: 77 U/L (ref 35–104)
ALT SERPL-CCNC: 11 U/L (ref 0–32)
ANION GAP SERPL CALCULATED.3IONS-SCNC: 10 MMOL/L (ref 7–16)
AST SERPL-CCNC: 16 U/L (ref 0–31)
BASOPHILS ABSOLUTE: 0.02 E9/L (ref 0–0.2)
BASOPHILS RELATIVE PERCENT: 0.5 % (ref 0–2)
BILIRUB SERPL-MCNC: 0.4 MG/DL (ref 0–1.2)
BUN BLDV-MCNC: 19 MG/DL (ref 6–23)
CALCIUM SERPL-MCNC: 9.7 MG/DL (ref 8.6–10.2)
CHLORIDE BLD-SCNC: 105 MMOL/L (ref 98–107)
CHOLESTEROL, TOTAL: 164 MG/DL (ref 0–199)
CO2: 29 MMOL/L (ref 22–29)
CREAT SERPL-MCNC: 0.7 MG/DL (ref 0.5–1)
EOSINOPHILS ABSOLUTE: 0.08 E9/L (ref 0.05–0.5)
EOSINOPHILS RELATIVE PERCENT: 2 % (ref 0–6)
GFR AFRICAN AMERICAN: >60
GFR NON-AFRICAN AMERICAN: >60 ML/MIN/1.73
GLUCOSE BLD-MCNC: 92 MG/DL (ref 74–99)
HBA1C MFR BLD: 5.5 % (ref 4–5.6)
HCT VFR BLD CALC: 44.3 % (ref 34–48)
HDLC SERPL-MCNC: 69 MG/DL
HEMOGLOBIN: 14 G/DL (ref 11.5–15.5)
IMMATURE GRANULOCYTES #: 0.01 E9/L
IMMATURE GRANULOCYTES %: 0.3 % (ref 0–5)
LDL CHOLESTEROL CALCULATED: 84 MG/DL (ref 0–99)
LYMPHOCYTES ABSOLUTE: 1.32 E9/L (ref 1.5–4)
LYMPHOCYTES RELATIVE PERCENT: 33 % (ref 20–42)
MCH RBC QN AUTO: 29.4 PG (ref 26–35)
MCHC RBC AUTO-ENTMCNC: 31.6 % (ref 32–34.5)
MCV RBC AUTO: 92.9 FL (ref 80–99.9)
MONOCYTES ABSOLUTE: 0.3 E9/L (ref 0.1–0.95)
MONOCYTES RELATIVE PERCENT: 7.5 % (ref 2–12)
NEUTROPHILS ABSOLUTE: 2.27 E9/L (ref 1.8–7.3)
NEUTROPHILS RELATIVE PERCENT: 56.7 % (ref 43–80)
PDW BLD-RTO: 13.3 FL (ref 11.5–15)
PLATELET # BLD: 187 E9/L (ref 130–450)
PMV BLD AUTO: 10.6 FL (ref 7–12)
POTASSIUM SERPL-SCNC: 4.2 MMOL/L (ref 3.5–5)
RBC # BLD: 4.77 E12/L (ref 3.5–5.5)
SODIUM BLD-SCNC: 144 MMOL/L (ref 132–146)
T4 FREE: 1.24 NG/DL (ref 0.93–1.7)
TOTAL PROTEIN: 6.6 G/DL (ref 6.4–8.3)
TRIGL SERPL-MCNC: 57 MG/DL (ref 0–149)
TSH SERPL DL<=0.05 MIU/L-ACNC: 2.35 UIU/ML (ref 0.27–4.2)
VLDLC SERPL CALC-MCNC: 11 MG/DL
WBC # BLD: 4 E9/L (ref 4.5–11.5)

## 2021-06-02 ENCOUNTER — OFFICE VISIT (OUTPATIENT)
Dept: FAMILY MEDICINE CLINIC | Age: 71
End: 2021-06-02
Payer: MEDICARE

## 2021-06-02 VITALS
WEIGHT: 182.6 LBS | BODY MASS INDEX: 31.18 KG/M2 | DIASTOLIC BLOOD PRESSURE: 80 MMHG | HEIGHT: 64 IN | TEMPERATURE: 98 F | HEART RATE: 70 BPM | OXYGEN SATURATION: 98 % | SYSTOLIC BLOOD PRESSURE: 124 MMHG

## 2021-06-02 DIAGNOSIS — K21.9 GASTROESOPHAGEAL REFLUX DISEASE WITHOUT ESOPHAGITIS: ICD-10-CM

## 2021-06-02 DIAGNOSIS — N18.5 CHRONIC KIDNEY DISEASE, STAGE 5 (HCC): ICD-10-CM

## 2021-06-02 DIAGNOSIS — Z12.31 ENCOUNTER FOR SCREENING MAMMOGRAM FOR BREAST CANCER: ICD-10-CM

## 2021-06-02 DIAGNOSIS — E78.2 MIXED HYPERLIPIDEMIA: ICD-10-CM

## 2021-06-02 DIAGNOSIS — I10 ESSENTIAL HYPERTENSION: Primary | ICD-10-CM

## 2021-06-02 DIAGNOSIS — E03.9 ACQUIRED HYPOTHYROIDISM: ICD-10-CM

## 2021-06-02 PROCEDURE — 99214 OFFICE O/P EST MOD 30 MIN: CPT | Performed by: FAMILY MEDICINE

## 2021-06-02 RX ORDER — ROSUVASTATIN CALCIUM 20 MG/1
TABLET, COATED ORAL
Qty: 18 TABLET | Refills: 1 | Status: SHIPPED
Start: 2021-06-02 | End: 2021-11-01 | Stop reason: SDUPTHER

## 2021-06-02 RX ORDER — LEVOTHYROXINE SODIUM 0.07 MG/1
75 TABLET ORAL DAILY
Qty: 90 TABLET | Refills: 1 | Status: SHIPPED
Start: 2021-06-02 | End: 2021-11-01 | Stop reason: SDUPTHER

## 2021-06-02 RX ORDER — PANTOPRAZOLE SODIUM 40 MG/1
40 TABLET, DELAYED RELEASE ORAL DAILY
Qty: 90 TABLET | Refills: 1 | Status: SHIPPED
Start: 2021-06-02 | End: 2021-11-01 | Stop reason: SDUPTHER

## 2021-06-02 RX ORDER — VERAPAMIL HYDROCHLORIDE 120 MG/1
120 TABLET, FILM COATED ORAL 2 TIMES DAILY
Qty: 180 TABLET | Refills: 1 | Status: SHIPPED
Start: 2021-06-02 | End: 2021-11-01 | Stop reason: SDUPTHER

## 2021-06-02 ASSESSMENT — ENCOUNTER SYMPTOMS
ABDOMINAL PAIN: 0
TROUBLE SWALLOWING: 0
COUGH: 0
VOMITING: 0
DIARRHEA: 0
CHEST TIGHTNESS: 0
NAUSEA: 0
EYE PAIN: 0
BACK PAIN: 0
SHORTNESS OF BREATH: 0
CONSTIPATION: 0
SINUS PAIN: 0
WHEEZING: 0
SORE THROAT: 0

## 2021-06-02 NOTE — PROGRESS NOTES
6/2/21    Name: Omayra Han  UVC:[birth year]   Sex:female   Age:67 y.o. Chief Complaint   Patient presents with    Hypertension    Hyperlipidemia     Patient presents to office for visit. She did have labs done. Patient says she really hasn't had any chest pain since her stress test. She had been checking her blood pressures, but she forgot to bring the list of readings with her today. Patient says she has never gotten any readings in the 140's. She will need refills today. Patient denies any side effects to the Crestor taking it twice a week. Patient here for check up on blood pressures and cholestrol  She is feeling much better  Since stress test and change in calan she has been feeling better chst pain, arm pain is gone'  Stress test was low cardiac risk    Labs reivwed and lipids much better with just 2 days a week  She has also been better abou t eating healthier    HTN much better as well  Readings at home 272'A systolic  She is feeling btter    She has been ore active and arrthirtis not bothering her as much    She is due for mammogram next month    Had her covid vaccine    Review of Systems   Constitutional: Negative for appetite change, fatigue and fever. HENT: Negative for congestion, ear pain, sinus pain, sore throat and trouble swallowing. Eyes: Negative for pain. Respiratory: Negative for cough, chest tightness, shortness of breath and wheezing. Cardiovascular: Negative for chest pain, palpitations and leg swelling. Gastrointestinal: Negative for abdominal pain, constipation, diarrhea, nausea and vomiting. Endocrine: Negative for cold intolerance and heat intolerance. Genitourinary: Negative for difficulty urinating, dysuria, frequency, hematuria, pelvic pain and urgency. Musculoskeletal: Negative for arthralgias, back pain, gait problem, joint swelling and myalgias. Skin: Negative for rash and wound.    Neurological: Negative for dizziness, syncope, light-headedness and headaches. Hematological: Negative for adenopathy. Psychiatric/Behavioral: Negative for confusion, dysphoric mood, self-injury, sleep disturbance and suicidal ideas. The patient is not nervous/anxious.             Current Outpatient Medications:     verapamil (CALAN) 120 MG tablet, Take 1 tablet by mouth 2 times daily, Disp: 180 tablet, Rfl: 1    rosuvastatin (CRESTOR) 20 MG tablet, 2 times a week, Disp: 18 tablet, Rfl: 1    pantoprazole (PROTONIX) 40 MG tablet, Take 1 tablet by mouth daily, Disp: 90 tablet, Rfl: 1    levothyroxine (SYNTHROID) 75 MCG tablet, Take 1 tablet by mouth daily, Disp: 90 tablet, Rfl: 1    fluocinonide (LIDEX) 0.05 % ointment, , Disp: , Rfl:     Clobetasol Propionate 0.05 % SHAM, , Disp: , Rfl:     Calcium Citrate (CITRACAL PO), Take by mouth, Disp: , Rfl:     aspirin 325 MG EC tablet, Take 325 mg by mouth daily, Disp: , Rfl:   Allergies   Allergen Reactions    Erythromycin     Sulfa Antibiotics       Past Medical History:   Diagnosis Date    Aneurysm ([address]     Aneurysm ([address]     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, with stent in left internal  carotid artery    Cerebral artery occlusion with cerebral infarction ([address]     GERD (gastroesophageal reflux disease)     Headache     Hyperlipidemia     Hypertension     Hypothyroidism     Osteoporosis     Thyroid disease      Patient Active Problem List    Diagnosis Date Noted    Vitamin D deficiency 03/19/2020    Hyperlipidemia 09/25/2019    Headache 08/06/2019    Hypothyroidism 08/06/2019    GERD (gastroesophageal reflux disease) 08/06/2019    Peroneal tendinitis of right lower extremity 05/30/2019    Pain in right foot 05/14/2019    Essential hypertension 11/14/2016    Cerebral aneurysm, nonruptured 05/21/2009      Past Surgical History:   Procedure Laterality Date    CAROTID ENDARTERECTOMY      HEMORRHOID SURGERY      HYSTERECTOMY      JOINT REPLACEMENT Bilateral     knee    KNEE ARTHROPLASTY Bilateral 2010    left sided stroke afterward, residual rt side weakness      Social History     Tobacco History     Smoking Status  Former Smoker Smoking Start Date  1/1/1971 Quit date  1/1/1981 Smoking Frequency  1 pack/day for 10 years (10 pk yrs)    Smoking Tobacco Type  Cigarettes    Smokeless Tobacco Use  Never Used          Alcohol History     Alcohol Use Status  Yes Comment  occas g;ass of wine          Drug Use     Drug Use Status  No          Sexual Activity     Sexually Active  Not Currently Partners  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 Constitutional:       General: She is not in acute distress. Appearance: Normal appearance. She is well-developed. She is not ill-appearing. HENT:      Head: Normocephalic and atraumatic. Right Ear: Tympanic membrane normal.      Left Ear: Tympanic membrane normal.      Nose: Nose normal.      Mouth/Throat:      Mouth: Mucous membranes are moist.   Eyes:      Pupils: Pupils are equal, round, and reactive to light. Cardiovascular:      Rate and Rhythm: Normal rate and regular rhythm. Pulmonary:      Effort: Pulmonary effort is normal.      Breath sounds: Normal breath sounds. Abdominal:      General: Bowel sounds are normal.      Palpations: Abdomen is soft. Musculoskeletal:      Cervical back: Normal range of motion. Comments: Gait steady and well balanced in the office today   Skin:     General: Skin is warm and dry. Neurological:      General: No focal deficit present. Mental Status: She is alert and oriented to person, place, and time. Psychiatric:         Mood and Affect: Mood normal.         Thought Content: Thought content normal.          Bairon Sanabria was seen today for hypertension and hyperlipidemia.     Diagnoses and all orders for this visit:    Essential hypertension Comments:  blood pressures much better at home 120/70's mostly  no higher readings, feeling much better  stress test reviewed again,  continue verapamil  120mg twice a day  Orders:  -     verapamil (CALAN) 120 MG tablet; Take 1 tablet by mouth 2 times daily  -     CBC Auto Differential; Future  -     Comprehensive Metabolic Panel; Future  -     Microalbumin, Ur; Future    Gastroesophageal reflux disease without esophagitis  Comments:  with diet changes has been great  pantoprazoel working with no issues  Orders:  -     pantoprazole (PROTONIX) 40 MG tablet; Take 1 tablet by mouth daily    Acquired hypothyroidism  Comments:  stable  labs reviwed no changes  Orders:  -     levothyroxine (SYNTHROID) 75 MCG tablet; Take 1 tablet by mouth daily  -     T4, Free; Future  -     TSH without Reflex; Future    Mixed hyperlipidemia  Comments:  improved with just crestor twice a week'  will continue this  nosisde effects, no leg cramps  Orders:  -     rosuvastatin (CRESTOR) 20 MG tablet; 2 times a week  -     Lipid Panel; Future    Encounter for screening mammogram for breast cancer  -     JIHAN DIGITAL SCREEN W OR WO CAD BILATERAL; Future    Chronic kidney disease, stage 5 (HCC)  Comments:  no chronic disease exists  HCC had  me pull this in    appt in 6 months with labs prior    I independently reviewed and updated the chief complaint, HPI, past medical and surgical history, medications, allergies and ROS as entered by the LPN. Seen by:   Jhon Healy DO

## 2021-06-21 ENCOUNTER — TELEPHONE (OUTPATIENT)
Dept: FAMILY MEDICINE CLINIC | Age: 71
End: 2021-06-21

## 2021-06-21 NOTE — TELEPHONE ENCOUNTER
Notified patient. Patient verbalized understanding. Pt denies coughing or SOB. She will try the recommendations.

## 2021-06-21 NOTE — TELEPHONE ENCOUNTER
Well I can see her some time this week    But she can also do very low salt diet  Elevate them and start compression knee highs  If she is not coughing or short of breath it is likely the humidity, salty diet and being on them too much

## 2021-07-06 ENCOUNTER — OFFICE VISIT (OUTPATIENT)
Dept: FAMILY MEDICINE CLINIC | Age: 71
End: 2021-07-06
Payer: MEDICARE

## 2021-07-06 VITALS
HEART RATE: 72 BPM | BODY MASS INDEX: 31.92 KG/M2 | HEIGHT: 64 IN | TEMPERATURE: 98.1 F | SYSTOLIC BLOOD PRESSURE: 120 MMHG | OXYGEN SATURATION: 98 % | DIASTOLIC BLOOD PRESSURE: 80 MMHG | WEIGHT: 187 LBS

## 2021-07-06 DIAGNOSIS — L23.7 POISON IVY DERMATITIS: ICD-10-CM

## 2021-07-06 PROCEDURE — 99213 OFFICE O/P EST LOW 20 MIN: CPT | Performed by: FAMILY MEDICINE

## 2021-07-06 RX ORDER — PREDNISONE 1 MG/1
5 TABLET ORAL 2 TIMES DAILY
Qty: 14 TABLET | Refills: 0 | Status: SHIPPED | OUTPATIENT
Start: 2021-07-06 | End: 2021-07-13

## 2021-07-06 ASSESSMENT — ENCOUNTER SYMPTOMS
ABDOMINAL PAIN: 0
VOMITING: 0
WHEEZING: 0
SORE THROAT: 0
EYE ITCHING: 1
TROUBLE SWALLOWING: 0
SINUS PAIN: 0
EYE PAIN: 1
CONSTIPATION: 0
CHEST TIGHTNESS: 0
SHORTNESS OF BREATH: 0
NAUSEA: 0
BACK PAIN: 0
COUGH: 0
DIARRHEA: 0

## 2021-07-06 NOTE — PROGRESS NOTES
7/6/21    Name: Adonay Nicolas  PLJ:[id number]   Sex:female   Age:67 y.o. Chief Complaint   Patient presents with    Eye Problem    Rash     Patient started with left eye swelling a few days ago. There is a red rash around her left eye. Patient says she does not remember getting bit by anything. She says the rash is itchy and some times painful. Her vision is a bit blurry in her left eye, she denies any drainage. Patient has a rash going down the left side of her neck that she believes is poison ivy. She says that the rash on her neck and the rash on her left eye are not from the same thing. Rash started 2 days ago and has progresesively gotten worse in the last 2 days  It is uncomfortable but no sharp pain, no tingling or burning  No odd sensations in scalp or left ear  Under left eyeis puffy and upper lid is slightly puffy  Vision seems normal  No pain with closing eye or opening or blinking  Not blurry    Has not had the shingles vaccine    She was pulling weeks last week around her home and there is poison ivy, she usually avoids this b/c she has had it in the past    Still concern for shingles is there  She has an eye doctor and will get into him this week        Review of Systems   Constitutional: Negative for appetite change, fatigue and fever. HENT: Negative for congestion, ear pain, sinus pain, sore throat and trouble swallowing. Eyes: Positive for pain and itching. Respiratory: Negative for cough, chest tightness, shortness of breath and wheezing. Cardiovascular: Negative for chest pain, palpitations and leg swelling. Gastrointestinal: Negative for abdominal pain, constipation, diarrhea, nausea and vomiting. Endocrine: Negative for cold intolerance and heat intolerance. Genitourinary: Negative for difficulty urinating, hematuria and pelvic pain. Musculoskeletal: Negative for back pain, gait problem and joint swelling. Skin: Positive for rash. Negative for wound. Neurological: Negative for dizziness, syncope and headaches. Hematological: Negative for adenopathy. Psychiatric/Behavioral: Negative for confusion, dysphoric mood, self-injury, sleep disturbance and suicidal ideas. The patient is not nervous/anxious.             Current Outpatient Medications:     predniSONE (DELTASONE) 5 MG tablet, Take 1 tablet by mouth 2 times daily for 7 days, Disp: 14 tablet, Rfl: 0    verapamil (CALAN) 120 MG tablet, Take 1 tablet by mouth 2 times daily, Disp: 180 tablet, Rfl: 1    rosuvastatin (CRESTOR) 20 MG tablet, 2 times a week, Disp: 18 tablet, Rfl: 1    pantoprazole (PROTONIX) 40 MG tablet, Take 1 tablet by mouth daily, Disp: 90 tablet, Rfl: 1    levothyroxine (SYNTHROID) 75 MCG tablet, Take 1 tablet by mouth daily, Disp: 90 tablet, Rfl: 1    fluocinonide (LIDEX) 0.05 % ointment, , Disp: , Rfl:     Clobetasol Propionate 0.05 % SHAM, , Disp: , Rfl:     Calcium Citrate (CITRACAL PO), Take by mouth, Disp: , Rfl:     aspirin 325 MG EC tablet, Take 325 mg by mouth daily, Disp: , Rfl:   Allergies   Allergen Reactions    Erythromycin     Sulfa Antibiotics       Past Medical History:   Diagnosis Date    Aneurysm ([address]     Aneurysm ([address]     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, with stent in left internal  carotid artery    Cerebral artery occlusion with cerebral infarction ([address]     GERD (gastroesophageal reflux disease)     Headache     Hyperlipidemia     Hypertension     Hypothyroidism     Osteoporosis     Thyroid disease      Patient Active Problem List    Diagnosis Date Noted    Poison ivy dermatitis 07/06/2021    Vitamin D deficiency 03/19/2020    Hyperlipidemia 09/25/2019    Headache 08/06/2019    Hypothyroidism 08/06/2019    GERD (gastroesophageal reflux disease) 08/06/2019    Peroneal tendinitis of right lower extremity 05/30/2019    Pain in right foot 05/14/2019    Essential hypertension 11/14/2016    Cerebral aneurysm, nonruptured 05/21/2009      Past Surgical History:   Procedure Laterality Date    CAROTID ENDARTERECTOMY      HEMORRHOID SURGERY      HYSTERECTOMY      JOINT REPLACEMENT Bilateral     knee    KNEE ARTHROPLASTY Bilateral 2010    left sided stroke afterward, residual rt side weakness      Social History     Tobacco History     Smoking Status  Former Smoker Smoking Start Date  1/1/1971 Quit date  1/1/1981 Smoking Frequency  1 pack/day for 10 years (10 pk yrs)    Smoking Tobacco Type  Cigarettes    Smokeless Tobacco Use  Never Used          Alcohol History     Alcohol Use Status  Yes Comment  occas g;ass of wine          Drug Use     Drug Use Status  No          Sexual Activity     Sexually Active  Not Currently Partners  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 Constitutional:       General: She is not in acute distress. Appearance: Normal appearance. She is well-developed. She is not ill-appearing. HENT:      Head: Normocephalic and atraumatic. Right Ear: Tympanic membrane normal.      Left Ear: Tympanic membrane normal.      Nose: Nose normal.   Eyes:      Pupils: Pupils are equal, round, and reactive to light. Comments: Upper and lower lid slightly swollen  Red coalesant rash, not raised and no blisters on face anywhere, rash spreads from forehead , left cheek, left chin and nexk area, also preauricular   Cardiovascular:      Rate and Rhythm: Normal rate and regular rhythm. Pulmonary:      Effort: Pulmonary effort is normal.      Breath sounds: Normal breath sounds. Musculoskeletal:      Cervical back: Normal range of motion. Skin:     General: Skin is warm and dry. Neurological:      General: No focal deficit present. Mental Status: She is alert and oriented to person, place, and time. Cranial Nerves: No cranial nerve deficit.

## 2021-08-12 ENCOUNTER — OFFICE VISIT (OUTPATIENT)
Dept: FAMILY MEDICINE CLINIC | Age: 71
End: 2021-08-12
Payer: MEDICARE

## 2021-08-12 VITALS
HEART RATE: 74 BPM | TEMPERATURE: 98.7 F | SYSTOLIC BLOOD PRESSURE: 130 MMHG | DIASTOLIC BLOOD PRESSURE: 70 MMHG | OXYGEN SATURATION: 100 %

## 2021-08-12 DIAGNOSIS — S90.851A FOREIGN BODY IN RIGHT FOOT, INITIAL ENCOUNTER: Primary | ICD-10-CM

## 2021-08-12 PROCEDURE — 10120 INC&RMVL FB SUBQ TISS SMPL: CPT | Performed by: PHYSICIAN ASSISTANT

## 2021-08-12 PROCEDURE — 99213 OFFICE O/P EST LOW 20 MIN: CPT | Performed by: PHYSICIAN ASSISTANT

## 2021-08-12 NOTE — PROGRESS NOTES
Chief Complaint   Other (feels like something is stuck in foot, right side )      History of Present Illness   Source of history provided by:  patient. Bernie Cabrera is a 65 y.o. old female presenting to the walk in clinic for evaluation of suspected foreign body over the plantar surface of the right foot. Patient states that she has been having pain with ambulation for the past few days. She does report walking around outdoors barefoot. Patient has not attempted to remove the foreign body at home herself. Denies any associated ankle pain. Denies any associated swelling or bruising. Denies any paresthesias, knee pain, weakness, fever, chills, or abrasions. Denies any hx of previous injuries or surgeries at the site. ROS    Unless otherwise stated in this report or unable to obtain because of the patient's clinical or mental status as evidenced by the medical record, this patients's positive and negative responses for Review of Systems, constitutional, psych, eyes, ENT, cardiovascular, respiratory, gastrointestinal, neurological, genitourinary, musculoskeletal, integument systems and systems related to the presenting problem are either stated in the preceding or were not pertinent or were negative for the symptoms and/or complaints related to the medical problem. Past Medical History:  has a past medical history of Aneurysm ([address] Aneurysm ([address] Cerebral artery occlusion with cerebral infarction ([address] GERD (gastroesophageal reflux disease), Headache, Hyperlipidemia, Hypertension, Hypothyroidism, Osteoporosis, and Thyroid disease. Past Surgical History:  has a past surgical history that includes Hemorrhoid surgery; Carotid endarterectomy; Knee Arthroplasty (Bilateral, 2010); joint replacement (Bilateral); Hysterectomy; and Ovary removal.  Social History:  reports that she quit smoking about 40 years ago. Her smoking use included cigarettes. She started smoking about 50 years ago.  She has a 10.00 pack-year smoking history. She has never used smokeless tobacco. She reports current alcohol use. She reports that she does not use drugs. Family History: family history includes Breast Cancer in her maternal aunt and maternal grandmother; Heart Failure in her mother. Allergies: Erythromycin and Sulfa antibiotics    Physical Exam         VS:  /70   Pulse 74   Temp 98.7 °F (37.1 °C)   SpO2 100%    Oxygen Saturation Interpretation: Normal.    Constitutional:  Alert, development consistent with age. Neck:  Normal ROM. Supple. Chest: Heart RRR without pathologic murmurs or gallops. Lungs CTAB without W/R/R. Foot: Tenderness: There is a pinpoint lesion noted to the plantar surface of the right foot. Associated TTP noted with deep palpation. Swelling: None. Deformity: No obvious deformity. ROM: ROM is physiologic. Skin:  No rash, abrasions, or erythema noted. Neurovascular:              Sensory deficit: Sensation intact proximal and distal to the injury site. Pulse deficit: Pulses 2+ and bounding. Capillary refill: Less then 2 sec throughout. Ankle:               Tenderness: None. Swelling: None. Deformity: No obvious deformity noted. ROM: ROM is physiologic. Skin:  No abrasions, erythema, or rashes noted. Gait: Normal gait noted. Lymphatics: No lymphangitis or adenopathy noted. Neurological:  Alert and oriented. Motor functions intact. Lab / Imaging Results   (All laboratory and radiology results have been personally reviewed by myself)  Labs:  No results found for this visit on 08/12/21. Imaging: All Radiology results interpreted by Radiologist unless otherwise noted. Assessment / Plan     Impression:  Mehnaz Richards was seen today for other.     Diagnoses and all orders for this visit:    Foreign body in right foot, initial encounter      Dipsoition:  Disposition: Discharge to home. I did successfully remove a small shard of glass from patient's right foot using an 18-gauge needle. Patient tolerated the procedure well. Wound care measures discussed at [facility] as well signs of secondary infection for which to monitor. F/u PCP in 3-5 days for recheck if symptoms persist. ED sooner if symptoms worsen or change. ED immediately with worsening pain/swelling, calf pain/swelling, fever, paresthesias, weakness, CP, or SOB. Pt is in agreement with this care plan. All questions answered. Isra Hargrove PA-C    **This report was transcribed using voice recognition software. Every effort was made to ensure accuracy; however, inadvertent computerized transcription errors may be present.

## 2021-10-22 DIAGNOSIS — E03.9 ACQUIRED HYPOTHYROIDISM: ICD-10-CM

## 2021-10-22 DIAGNOSIS — E78.2 MIXED HYPERLIPIDEMIA: ICD-10-CM

## 2021-10-22 DIAGNOSIS — I10 ESSENTIAL HYPERTENSION: ICD-10-CM

## 2021-10-22 LAB
ALBUMIN SERPL-MCNC: 4.1 G/DL (ref 3.5–5.2)
ALP BLD-CCNC: 74 U/L (ref 35–104)
ALT SERPL-CCNC: 15 U/L (ref 0–32)
ANION GAP SERPL CALCULATED.3IONS-SCNC: 11 MMOL/L (ref 7–16)
AST SERPL-CCNC: 18 U/L (ref 0–31)
BASOPHILS ABSOLUTE: 0.02 E9/L (ref 0–0.2)
BASOPHILS RELATIVE PERCENT: 0.5 % (ref 0–2)
BILIRUB SERPL-MCNC: <0.2 MG/DL (ref 0–1.2)
BUN BLDV-MCNC: 18 MG/DL (ref 6–23)
CALCIUM SERPL-MCNC: 9.5 MG/DL (ref 8.6–10.2)
CHLORIDE BLD-SCNC: 108 MMOL/L (ref 98–107)
CHOLESTEROL, TOTAL: 142 MG/DL (ref 0–199)
CO2: 26 MMOL/L (ref 22–29)
CREAT SERPL-MCNC: 0.7 MG/DL (ref 0.5–1)
EOSINOPHILS ABSOLUTE: 0.12 E9/L (ref 0.05–0.5)
EOSINOPHILS RELATIVE PERCENT: 2.8 % (ref 0–6)
GFR AFRICAN AMERICAN: >60
GFR NON-AFRICAN AMERICAN: >60 ML/MIN/1.73
GLUCOSE BLD-MCNC: 98 MG/DL (ref 74–99)
HCT VFR BLD CALC: 42.4 % (ref 34–48)
HDLC SERPL-MCNC: 67 MG/DL
HEMOGLOBIN: 13.7 G/DL (ref 11.5–15.5)
IMMATURE GRANULOCYTES #: 0.02 E9/L
IMMATURE GRANULOCYTES %: 0.5 % (ref 0–5)
LDL CHOLESTEROL CALCULATED: 66 MG/DL (ref 0–99)
LYMPHOCYTES ABSOLUTE: 1.68 E9/L (ref 1.5–4)
LYMPHOCYTES RELATIVE PERCENT: 39.1 % (ref 20–42)
MCH RBC QN AUTO: 29.2 PG (ref 26–35)
MCHC RBC AUTO-ENTMCNC: 32.3 % (ref 32–34.5)
MCV RBC AUTO: 90.4 FL (ref 80–99.9)
MICROALBUMIN UR-MCNC: <12 MG/L
MONOCYTES ABSOLUTE: 0.38 E9/L (ref 0.1–0.95)
MONOCYTES RELATIVE PERCENT: 8.8 % (ref 2–12)
NEUTROPHILS ABSOLUTE: 2.08 E9/L (ref 1.8–7.3)
NEUTROPHILS RELATIVE PERCENT: 48.3 % (ref 43–80)
PDW BLD-RTO: 13.3 FL (ref 11.5–15)
PLATELET # BLD: 181 E9/L (ref 130–450)
PMV BLD AUTO: 11 FL (ref 7–12)
POTASSIUM SERPL-SCNC: 4.4 MMOL/L (ref 3.5–5)
RBC # BLD: 4.69 E12/L (ref 3.5–5.5)
SODIUM BLD-SCNC: 145 MMOL/L (ref 132–146)
T4 FREE: 1.22 NG/DL (ref 0.93–1.7)
TOTAL PROTEIN: 6.1 G/DL (ref 6.4–8.3)
TRIGL SERPL-MCNC: 43 MG/DL (ref 0–149)
TSH SERPL DL<=0.05 MIU/L-ACNC: 2.84 UIU/ML (ref 0.27–4.2)
VLDLC SERPL CALC-MCNC: 9 MG/DL
WBC # BLD: 4.3 E9/L (ref 4.5–11.5)

## 2021-10-29 ENCOUNTER — TELEPHONE (OUTPATIENT)
Dept: ADMINISTRATIVE | Age: 71
End: 2021-10-29

## 2021-10-29 NOTE — TELEPHONE ENCOUNTER
Patient has something going on with her great toe- cannot wear shoes. She would like to see Dr Kiana infante. Please call her and advise.  Thanks

## 2021-11-01 ENCOUNTER — OFFICE VISIT (OUTPATIENT)
Dept: FAMILY MEDICINE CLINIC | Age: 71
End: 2021-11-01
Payer: MEDICARE

## 2021-11-01 ENCOUNTER — OFFICE VISIT (OUTPATIENT)
Dept: PODIATRY | Age: 71
End: 2021-11-01
Payer: MEDICARE

## 2021-11-01 VITALS
SYSTOLIC BLOOD PRESSURE: 124 MMHG | HEIGHT: 64 IN | OXYGEN SATURATION: 98 % | DIASTOLIC BLOOD PRESSURE: 72 MMHG | TEMPERATURE: 98.2 F | HEART RATE: 72 BPM | WEIGHT: 194.6 LBS | BODY MASS INDEX: 33.22 KG/M2

## 2021-11-01 VITALS
TEMPERATURE: 97.2 F | BODY MASS INDEX: 33.12 KG/M2 | SYSTOLIC BLOOD PRESSURE: 124 MMHG | HEIGHT: 64 IN | DIASTOLIC BLOOD PRESSURE: 72 MMHG | WEIGHT: 194 LBS

## 2021-11-01 DIAGNOSIS — Z23 NEED FOR VACCINATION: ICD-10-CM

## 2021-11-01 DIAGNOSIS — E78.2 MIXED HYPERLIPIDEMIA: ICD-10-CM

## 2021-11-01 DIAGNOSIS — M79.675 PAIN IN LEFT TOE(S): ICD-10-CM

## 2021-11-01 DIAGNOSIS — L60.0 INGROWN NAIL: Primary | ICD-10-CM

## 2021-11-01 DIAGNOSIS — I10 ESSENTIAL HYPERTENSION: Primary | ICD-10-CM

## 2021-11-01 DIAGNOSIS — E03.9 ACQUIRED HYPOTHYROIDISM: ICD-10-CM

## 2021-11-01 DIAGNOSIS — K21.9 GASTROESOPHAGEAL REFLUX DISEASE WITHOUT ESOPHAGITIS: ICD-10-CM

## 2021-11-01 PROCEDURE — 99214 OFFICE O/P EST MOD 30 MIN: CPT | Performed by: FAMILY MEDICINE

## 2021-11-01 PROCEDURE — 99213 OFFICE O/P EST LOW 20 MIN: CPT | Performed by: PODIATRIST

## 2021-11-01 RX ORDER — ROSUVASTATIN CALCIUM 20 MG/1
TABLET, COATED ORAL
Qty: 18 TABLET | Refills: 1 | Status: SHIPPED
Start: 2021-11-01 | End: 2022-04-26 | Stop reason: SDUPTHER

## 2021-11-01 RX ORDER — CLOBETASOL PROPIONATE 0.5 MG/G
OINTMENT TOPICAL AS NEEDED
COMMUNITY
Start: 2021-09-07

## 2021-11-01 RX ORDER — PANTOPRAZOLE SODIUM 40 MG/1
40 TABLET, DELAYED RELEASE ORAL DAILY
Qty: 90 TABLET | Refills: 1 | Status: SHIPPED
Start: 2021-11-01 | End: 2022-04-26 | Stop reason: SDUPTHER

## 2021-11-01 RX ORDER — LEVOTHYROXINE SODIUM 0.07 MG/1
75 TABLET ORAL DAILY
Qty: 90 TABLET | Refills: 1 | Status: SHIPPED
Start: 2021-11-01 | End: 2022-04-26 | Stop reason: SDUPTHER

## 2021-11-01 RX ORDER — VERAPAMIL HYDROCHLORIDE 120 MG/1
120 TABLET, FILM COATED ORAL 2 TIMES DAILY
Qty: 180 TABLET | Refills: 1 | Status: SHIPPED
Start: 2021-11-01 | End: 2022-04-26 | Stop reason: SDUPTHER

## 2021-11-01 ASSESSMENT — ENCOUNTER SYMPTOMS
VOMITING: 0
SINUS PAIN: 0
DIARRHEA: 0
TROUBLE SWALLOWING: 0
ABDOMINAL PAIN: 0
EYE PAIN: 0
SORE THROAT: 0
NAUSEA: 0
CONSTIPATION: 0
BACK PAIN: 0
CHEST TIGHTNESS: 0
SHORTNESS OF BREATH: 0
WHEEZING: 0
COUGH: 0

## 2021-11-01 NOTE — PROGRESS NOTES
[id number] PODIATRY  [address] [place] [id number]  Dept: [phone]  Dept Fax: [phone] TOENAIL NOTE  Date of patient's visit: 11/1/2021  Patient's Name:  Timothy Murillo YOB: 1950            Patient Care Team:  Feli Esteves DO as PCP - General  Feli Esteves DO as PCP - [facility] Empaneled Provider      Chief Complaint   Patient presents with    Foot Pain     pt was last seen in 5/2019 for peroneal tendinitis right presents today for left first ingrown    Toe Pain    Nail Problem    Ingrown Toenail     left first        Hussein Sake [address] 66 y.o. female that presents complaining of a painful ingrown toenail on the 1st Left toes. Conservative therapy has failed. Symptoms began 1 week(s) ago. Patient relates pain is Present. Pain is rated 2 out of 10 and is described as waxing and waning. Treatments prior to today's visit include: . Currently denies F/C/N/V. Allergies   Allergen Reactions    Erythromycin     Sulfa Antibiotics        Past Medical History:   Diagnosis Date    [facility]     [facility]neurysm [address]     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, with stent in left internal  carotid artery    Cerebral artery occlusion with cerebral infarction ([facility]     GERD (gastroesophageal reflux disease)     Headache     Hyperlipidemia     Hypertension     Hypothyroidism     Osteoporosis     Thyroid disease        Prior to Admission medications    Medication Sig Start Date End Date Taking?  Authorizing Provider   clobetasol (TEMOVATE) 0.05 % ointment APPLY TO AFFECTED AREA THREE TIMES WEEKLY 9/7/21  Yes Historical Provider, MD   levothyroxine (SYNTHROID) 75 MCG tablet Take 1 tablet by mouth daily 11/1/21  Yes Feli Esteves DO   pantoprazole (PROTONIX) 40 MG tablet Take 1 tablet by mouth daily 11/1/21  Yes Feli Esteves DO   rosuvastatin (CRESTOR) 20 MG tablet 2 times a week 11/1/21  Yes Feli Esteves DO verapamil (CALAN) 120 MG tablet Take 1 tablet by mouth 2 times daily 21  Yes Ray Bernabe DO   fluocinonide (LIDEX) 0.05 % ointment  3/2/21  Yes Historical Provider, MD   Calcium Citrate (CITRACAL PO) Take by mouth   Yes Historical Provider, MD   aspirin 325 MG EC tablet Take 325 mg by mouth daily   Yes Historical Provider, MD       Past Surgical History:   Procedure Laterality Date    CAROTID ENDARTERECTOMY      HEMORRHOID SURGERY      HYSTERECTOMY      Partial at age 30    JOINT REPLACEMENT Bilateral     knee    KNEE ARTHROPLASTY Bilateral     left sided stroke afterward, residual rt side weakness    OVARY REMOVAL      at age 26       Family History   Problem Relation Age of Onset    Heart Failure Mother     Breast Cancer Maternal Grandmother     Breast Cancer Maternal Aunt        Social History     Tobacco Use    Smoking status: Former Smoker     Packs/day: 1.00     Years: 10.00     Pack years: 10.00     Types: Cigarettes     Start date:      Quit date: 1981     Years since quittin.8    Smokeless tobacco: Never Used   Substance Use Topics    Alcohol use: Yes     Comment: occas g;ass of wine       Lower Extremity Physical Examination:     Vitals:   Vitals:    21 1538   BP: 124/72   Temp: 97.2 °F (36.2 °C)     General: AAO x 3 in NAD. Integument: Incurvated toenail with localized swelling, pain, erythema, pain with palpation and shoe gear  1st Left      Vascular: DP and PT pulses palpable 2/4, Left. CFT <1 seconds, Neurological: Musculoskeletal:       Asessment: Patient is a 66 y.o. female with:   Encounter Diagnoses   Name Primary?  Ingrown nail Yes    Pain in left toe(s)       Paronychia  Onychocryptosis    Plan: Patient examined and evaluated. Current condition and treatment options discussed in detail.       Verbal consent obtained for nail removal      The patient was instructed to leave this dressing clean/dry/intact until the following am at which point they will begin application of antibiotic ointment/Amerigel and Band-Aid QD. Patient instructed to take Tylenol or Ibuprofen PRN pain. Contact office with any questions/problems/concerns. No orders of the defined types were placed in this encounter. RTC in 5week(s).     11/1/2021

## 2021-11-01 NOTE — PROGRESS NOTES
21    Name: Parveen Bazan  :1950   Sex:female   Age:71 y.o. Chief Complaint   Patient presents with    Hypertension    Hyperlipidemia    Hypothyroidism     Patient presents to office for visit. She did have labs done. Patient would like to have her covid booster before she gets her flu shot. She is going to [place] for 6 days, so she plans to get booster before and flu shot after. Patient denies any changes in her medications. She will need refills today. Here for a check up  Doing well    HTN stble  Readings hve been good  Continue meds oc hanges    Hyperlipdiemia  Doing great with diet changes and statin  crestor at twice a week and LDL reduced by 50%  She is tolerating this, no side effects    Hypothyroidism'  stable  No changes in dose  She is doing well        Review of Systems   Constitutional: Negative for appetite change, fatigue and fever. HENT: Negative for congestion, ear pain, sinus pain, sore throat and trouble swallowing. Eyes: Negative for pain. Respiratory: Negative for cough, chest tightness, shortness of breath and wheezing. Cardiovascular: Negative for chest pain, palpitations and leg swelling. Gastrointestinal: Negative for abdominal pain, constipation, diarrhea, nausea and vomiting. Endocrine: Negative for cold intolerance and heat intolerance. Genitourinary: Negative for difficulty urinating, dysuria, frequency, hematuria, pelvic pain and urgency. Musculoskeletal: Negative for arthralgias, back pain, gait problem, joint swelling and myalgias. Skin: Negative for rash and wound. Neurological: Negative for dizziness, syncope, light-headedness and headaches. Hematological: Negative for adenopathy. Psychiatric/Behavioral: Negative for confusion, dysphoric mood, self-injury, sleep disturbance and suicidal ideas. The patient is not nervous/anxious.             Current Outpatient Medications:     levothyroxine (SYNTHROID) 75 MCG tablet, Take 1 tablet by mouth daily, Disp: 90 tablet, Rfl: 1    pantoprazole (PROTONIX) 40 MG tablet, Take 1 tablet by mouth daily, Disp: 90 tablet, Rfl: 1    rosuvastatin (CRESTOR) 20 MG tablet, 2 times a week, Disp: 18 tablet, Rfl: 1    verapamil (CALAN) 120 MG tablet, Take 1 tablet by mouth 2 times daily, Disp: 180 tablet, Rfl: 1    clobetasol (TEMOVATE) 0.05 % ointment, APPLY TO AFFECTED AREA THREE TIMES WEEKLY, Disp: , Rfl:     fluocinonide (LIDEX) 0.05 % ointment, , Disp: , Rfl:     Calcium Citrate (CITRACAL PO), Take by mouth, Disp: , Rfl:     aspirin 325 MG EC tablet, Take 325 mg by mouth daily, Disp: , Rfl:   Allergies   Allergen Reactions    Erythromycin     Sulfa Antibiotics       Past Medical History:   Diagnosis Date    Aneurysm ([address]     [facility] ([facility] [facility]     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, with stent in left internal  carotid artery    Cerebral artery occlusion with cerebral infarction ([address]     GERD (gastroesophageal reflux disease)     Headache     Hyperlipidemia     Hypertension     Hypothyroidism     Osteoporosis     Thyroid disease      Patient Active Problem List    Diagnosis Date Noted    Poison ivy dermatitis 07/06/2021    Vitamin D deficiency 03/19/2020    Hyperlipidemia 09/25/2019    Headache 08/06/2019    Hypothyroidism 08/06/2019    GERD (gastroesophageal reflux disease) 08/06/2019    Peroneal tendinitis of right lower extremity 05/30/2019    Pain in right foot 05/14/2019    Essential hypertension 11/14/2016    Cerebral aneurysm, nonruptured 05/21/2009      Past Surgical History:   Procedure Laterality Date    CAROTID ENDARTERECTOMY      HEMORRHOID SURGERY      HYSTERECTOMY      Partial at age 30   Ingrid Pepe JOINT REPLACEMENT Bilateral     knee    KNEE ARTHROPLASTY Bilateral 2010    left sided stroke afterward, residual rt side weakness    OVARY REMOVAL      at age 26      Social History     Tobacco History     Smoking Status  Former Smoker Smoking Start Date  1/1/1971 Quit date  1/1/1981 Smoking Frequency  1 pack/day for 10 years (10 pk yrs)    Smoking Tobacco Type  Cigarettes    Smokeless Tobacco Use  Never Used          Alcohol History     Alcohol Use Status  Yes Comment  occas g;ass of wine          Drug Use     Drug Use Status  No          Sexual Activity     Sexually Active  Not Currently Partners  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 Constitutional:       General: She is not in acute distress. Appearance: She is well-developed. She is obese. She is not ill-appearing. HENT:      Head: Normocephalic and atraumatic. Right Ear: Tympanic membrane normal.      Left Ear: Tympanic membrane normal.      Nose: Nose normal.      Mouth/Throat:      Mouth: Mucous membranes are moist.   Eyes:      Pupils: Pupils are equal, round, and reactive to light. Cardiovascular:      Rate and Rhythm: Normal rate and regular rhythm. Pulmonary:      Effort: Pulmonary effort is normal.      Breath sounds: Normal breath sounds. Abdominal:      General: Bowel sounds are normal.      Palpations: Abdomen is soft. Musculoskeletal:      Cervical back: Normal range of motion. Comments: Gait steady in the office today   Skin:     General: Skin is warm and dry. Neurological:      Mental Status: She is alert and oriented to person, place, and time. Mental status is at baseline. Psychiatric:         Mood and Affect: Mood normal.         Thought Content: Thought content normal.          Oksana Art was seen today for hypertension, hyperlipidemia and hypothyroidism.     Diagnoses and all orders for this visit:    Essential hypertension  Comments:  blood pressures much better at home 120/70's mostly  no higher readings, feeling much better  stress test reviewed again,  continue verapamil  120mg twice a day  Orders:  - verapamil (CALAN) 120 MG tablet; Take 1 tablet by mouth 2 times daily  -     CBC Auto Differential; Future  -     Comprehensive Metabolic Panel; Future    Acquired hypothyroidism  Comments:  stable  labs reviwed no changes  Orders:  -     levothyroxine (SYNTHROID) 75 MCG tablet; Take 1 tablet by mouth daily  -     T4, Free; Future  -     TSH without Reflex; Future    Gastroesophageal reflux disease without esophagitis  Comments:  with diet changes has been great  pantoprazoel working with no issues  Orders:  -     pantoprazole (PROTONIX) 40 MG tablet; Take 1 tablet by mouth daily    Mixed hyperlipidemia  Comments:  improved with just crestor twice a week'  will continue this  nosisde effects, no leg cramps  Orders:  -     rosuvastatin (CRESTOR) 20 MG tablet; 2 times a week  -     Lipid Panel; Future    Need for vaccination  Comments:  will get flu shot at the pharmacy  she is getting covid booster this week  then shingles vaccine in a few weeks        I independently reviewed and updated the chief complaint, HPI, past medical and surgical history, medications, allergies and ROS as entered by the LPN. Seen by:   Avis Moran,

## 2021-12-09 ENCOUNTER — NURSE ONLY (OUTPATIENT)
Dept: FAMILY MEDICINE CLINIC | Age: 71
End: 2021-12-09
Payer: MEDICARE

## 2021-12-09 DIAGNOSIS — Z23 NEED FOR VACCINATION: Primary | ICD-10-CM

## 2021-12-09 PROCEDURE — 90694 VACC AIIV4 NO PRSRV 0.5ML IM: CPT | Performed by: FAMILY MEDICINE

## 2021-12-09 PROCEDURE — 99999 PR OFFICE/OUTPT VISIT,PROCEDURE ONLY: CPT | Performed by: FAMILY MEDICINE

## 2021-12-09 PROCEDURE — G0008 ADMIN INFLUENZA VIRUS VAC: HCPCS | Performed by: FAMILY MEDICINE

## 2022-02-10 ENCOUNTER — TELEPHONE (OUTPATIENT)
Dept: FAMILY MEDICINE CLINIC | Age: 72
End: 2022-02-10

## 2022-02-10 DIAGNOSIS — G44.011 INTRACTABLE EPISODIC CLUSTER HEADACHE: Primary | ICD-10-CM

## 2022-02-10 NOTE — TELEPHONE ENCOUNTER
----- Message from Breana Tatum sent at 2/10/2022  2:44 PM EST -----  Subject: Referral Request    QUESTIONS   Reason for referral request? Pt needs referral to neurology for headaches. Has the physician seen you for this condition before? Yes  Select a date? 2021-11-08  Select the Provider the patient wants to be referred to, if known (PCP or   Specialist)? Reena Iglesias   Preferred Specialist (if applicable)? Reena Iglesias  Do you already have an appointment scheduled? No  Additional Information for Provider? The neurology office phone number is   [phone].   ---------------------------------------------------------------------------  --------------  CALL BACK INFO  What is the best way for the office to contact you? OK to leave message on   voicemail  Preferred Call Back Phone Number?  [phone]

## 2022-02-11 DIAGNOSIS — R51.9 RECURRENT OCCIPITAL HEADACHE: Primary | ICD-10-CM

## 2022-02-11 DIAGNOSIS — I67.1 SUPRACLINOID CAROTID ARTERY ANEURYSM, SMALL: ICD-10-CM

## 2022-02-24 ENCOUNTER — HOSPITAL ENCOUNTER (OUTPATIENT)
Dept: CT IMAGING | Age: 72
Discharge: HOME | End: 2022-02-26
Payer: MEDICARE

## 2022-02-24 ENCOUNTER — HOSPITAL ENCOUNTER (OUTPATIENT)
Age: 72
Discharge: HOME | End: 2022-02-24
Payer: MEDICARE

## 2022-02-24 DIAGNOSIS — I10 ESSENTIAL HYPERTENSION: ICD-10-CM

## 2022-02-24 DIAGNOSIS — I67.1 SUPRACLINOID CAROTID ARTERY ANEURYSM, SMALL: ICD-10-CM

## 2022-02-24 DIAGNOSIS — R51.9 RECURRENT OCCIPITAL HEADACHE: ICD-10-CM

## 2022-02-24 LAB
ALBUMIN SERPL-MCNC: 5 G/DL (ref 3.5–5.2)
ALP BLD-CCNC: 87 U/L (ref 35–104)
ALT SERPL-CCNC: 13 U/L (ref 0–32)
ANION GAP SERPL CALCULATED.3IONS-SCNC: 12 MMOL/L (ref 7–16)
AST SERPL-CCNC: 20 U/L (ref 0–31)
BILIRUB SERPL-MCNC: 0.3 MG/DL (ref 0–1.2)
BUN BLDV-MCNC: 16 MG/DL (ref 6–23)
CALCIUM SERPL-MCNC: 9.9 MG/DL (ref 8.6–10.2)
CHLORIDE BLD-SCNC: 101 MMOL/L (ref 98–107)
CO2: 26 MMOL/L (ref 22–29)
CREAT SERPL-MCNC: 0.8 MG/DL (ref 0.5–1)
GFR AFRICAN AMERICAN: >60
GFR NON-AFRICAN AMERICAN: >60 ML/MIN/1.73
GLUCOSE BLD-MCNC: 91 MG/DL (ref 74–99)
POTASSIUM SERPL-SCNC: 4 MMOL/L (ref 3.5–5)
SODIUM BLD-SCNC: 139 MMOL/L (ref 132–146)
TOTAL PROTEIN: 7.5 G/DL (ref 6.4–8.3)

## 2022-02-24 PROCEDURE — 36415 COLL VENOUS BLD VENIPUNCTURE: CPT

## 2022-02-24 PROCEDURE — 70470 CT HEAD/BRAIN W/O & W/DYE: CPT

## 2022-02-24 PROCEDURE — 6360000004 HC RX CONTRAST MEDICATION: Performed by: RADIOLOGY

## 2022-02-24 PROCEDURE — 80053 COMPREHEN METABOLIC PANEL: CPT

## 2022-02-24 RX ADMIN — IOPAMIDOL 75 ML: 755 INJECTION, SOLUTION INTRAVENOUS at 18:15

## 2022-03-04 ENCOUNTER — OFFICE VISIT (OUTPATIENT)
Dept: FAMILY MEDICINE CLINIC | Age: 72
End: 2022-03-04
Payer: MEDICARE

## 2022-03-04 VITALS
RESPIRATION RATE: 18 BRPM | OXYGEN SATURATION: 99 % | BODY MASS INDEX: 32.78 KG/M2 | HEIGHT: 64 IN | SYSTOLIC BLOOD PRESSURE: 122 MMHG | DIASTOLIC BLOOD PRESSURE: 74 MMHG | HEART RATE: 72 BPM | TEMPERATURE: 98.2 F | WEIGHT: 192 LBS

## 2022-03-04 DIAGNOSIS — S61.452A CAT BITE OF LEFT HAND, INITIAL ENCOUNTER: Primary | ICD-10-CM
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
## 2022-03-04 PROCEDURE — 99213 OFFICE O/P EST LOW 20 MIN: CPT | Performed by: PHYSICIAN ASSISTANT

## 2022-03-04 RX ORDER — AMOXICILLIN AND CLAVULANATE POTASSIUM 875; 125 MG/1; MG/1
1 TABLET, FILM COATED ORAL 2 TIMES DAILY
Qty: 20 TABLET | Refills: 0 | Status: SHIPPED | OUTPATIENT
Start: 2022-03-04 | End: 2022-03-14

## 2022-03-04 NOTE — PROGRESS NOTES
Chief Complaint       Animal Bite      History of Present Illness   Source of history provided by: patient. Marc Lopez is a 66 y.o. old female presenting to the walk in clinic for a cat bite to the left hand which occurred last night. Pt states she was petting her house cat when he suddenly bit her. Animal is up to date on all vaccines. Pt states bleeding is controlled and there is no N/T to the site. The patients tetanus status is up to date. Pt denies any significant pain at the site, loss of function to the area, fever, chills, lethargy, N/V, or syncope. ROS    Unless otherwise stated in this report or unable to obtain because of the patient's clinical or mental status as evidenced by the medical record, this patients's positive and negative responses for Review of Systems, constitutional, psych, eyes, ENT, cardiovascular, respiratory, gastrointestinal, neurological, genitourinary, musculoskeletal, integument systems and systems related to the presenting problem are either stated in the preceding or were not pertinent or were negative for the symptoms and/or complaints related to the medical problem. Past Medical History:  has a past medical history of Aneurysm ([address] Aneurysm ([address] Cerebral artery occlusion with cerebral infarction ([address] GERD (gastroesophageal reflux disease), Headache, Hyperlipidemia, Hypertension, Hypothyroidism, Osteoporosis, and Thyroid disease. Past Surgical History:  has a past surgical history that includes Hemorrhoid surgery; Carotid endarterectomy; Knee Arthroplasty (Bilateral, 2010); joint replacement (Bilateral); Hysterectomy; and Ovary removal.  Social History:  reports that she quit smoking about 41 years ago. Her smoking use included cigarettes. She started smoking about 51 years ago. She has a 10.00 pack-year smoking history. She has never used smokeless tobacco. She reports current alcohol use. She reports that she does not use drugs.   Family History: family history includes Breast Cancer in her maternal aunt and maternal grandmother; Heart Failure in her mother. Allergies: Erythromycin and Sulfa antibiotics    Physical Exam         VS:  /74   Pulse 72   Temp 98.2 °F (36.8 °C)   Resp 18   Ht 5' 4\" (1.626 m)   Wt 192 lb (87.1 kg)   SpO2 99%   BMI 32.96 kg/m²    Oxygen Saturation Interpretation: Normal.    Constitutional:  Alert, development consistent with age. Chest: Heart RRR without pathologic murmurs or gallops. Lungs CTAB without W/R/R. Skin: 1 cm laceration/puncture wound to the dorsum of the left hand noted. No current active bleeding. Wound explored extensively and no FB present. No tendon laceration noted. FROM without deficits or weakness. There is moderate surrounding erythema and swelling noted. No induration noted. No purulent drainage noted. Distal sensation intact. Cap refill less then 2 sec. Lymphatics: No lymphangitis or adenopathy noted. Neurological:  Alert and oriented. Motor functions intact. Lab / Imaging Results   (All laboratory and radiology results have been personally reviewed by myself)  Labs:  No results found for this visit on 03/04/22. Imaging: All Radiology results interpreted by Radiologist unless otherwise noted. Assessment / Plan     Impression(s):  Satish Amin was seen today for animal bite. Diagnoses and all orders for this visit:    Cat bite of left hand, initial encounter  -     amoxicillin-clavulanate (AUGMENTIN) 875-125 MG per tablet; Take 1 tablet by mouth 2 times daily for 10 days      Disposition:  Disposition: Discharge to home. Script written for Augmentin, side effects discussed. Wound care measures discussed at length. Continue topical antibiotic ointment at home. PCP in 5-7 days if symptoms persist. ED sooner if symptoms worsen or change.  ED immediately with signs of secondary infection including spreading erythema, worsening swelling, increased tenderness, lymphangitic streaking, or purulent discharge. ED with any weakness, paresthesias, or uncontrolled bleeding. Pt states understanding and is in agreement with this care plan. All questions answered. Lio Hargrove PA-C    **This report was transcribed using voice recognition software. Every effort was made to ensure accuracy; however, inadvertent computerized transcription errors may be present.

## 2022-04-12 ENCOUNTER — OFFICE VISIT (OUTPATIENT)
Dept: NEUROLOGY | Age: 72
End: 2022-04-12
Payer: MEDICARE

## 2022-04-12 VITALS
RESPIRATION RATE: 18 BRPM | TEMPERATURE: 98.6 F | BODY MASS INDEX: 32.78 KG/M2 | HEART RATE: 92 BPM | DIASTOLIC BLOOD PRESSURE: 82 MMHG | HEIGHT: 64 IN | SYSTOLIC BLOOD PRESSURE: 152 MMHG | WEIGHT: 192 LBS | OXYGEN SATURATION: 98 %

## 2022-04-12 DIAGNOSIS — M54.81 CERVICO-OCCIPITAL NEURALGIA OF LEFT SIDE: ICD-10-CM

## 2022-04-12 DIAGNOSIS — M54.2 CERVICALGIA: Primary | ICD-10-CM

## 2022-04-12 DIAGNOSIS — M62.838 CERVICAL PARASPINAL MUSCLE SPASM: ICD-10-CM

## 2022-04-12 DIAGNOSIS — M54.81 CERVICO-OCCIPITAL NEURALGIA OF RIGHT SIDE: ICD-10-CM

## 2022-04-12 PROCEDURE — 99203 OFFICE O/P NEW LOW 30 MIN: CPT | Performed by: PSYCHIATRY & NEUROLOGY

## 2022-04-12 ASSESSMENT — ENCOUNTER SYMPTOMS: GASTROINTESTINAL NEGATIVE: 1
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
## 2022-04-22 DIAGNOSIS — E03.9 ACQUIRED HYPOTHYROIDISM: ICD-10-CM

## 2022-04-22 DIAGNOSIS — I10 ESSENTIAL HYPERTENSION: ICD-10-CM

## 2022-04-22 DIAGNOSIS — E78.2 MIXED HYPERLIPIDEMIA: ICD-10-CM

## 2022-04-22 LAB
BASOPHILS ABSOLUTE: 0.02 E9/L (ref 0–0.2)
BASOPHILS RELATIVE PERCENT: 0.5 % (ref 0–2)
CHOLESTEROL, TOTAL: 160 MG/DL (ref 0–199)
EOSINOPHILS ABSOLUTE: 0.1 E9/L (ref 0.05–0.5)
EOSINOPHILS RELATIVE PERCENT: 2.5 % (ref 0–6)
HCT VFR BLD CALC: 39.8 % (ref 34–48)
HDLC SERPL-MCNC: 68 MG/DL
HEMOGLOBIN: 12.8 G/DL (ref 11.5–15.5)
IMMATURE GRANULOCYTES #: 0.01 E9/L
IMMATURE GRANULOCYTES %: 0.2 % (ref 0–5)
LDL CHOLESTEROL CALCULATED: 83 MG/DL (ref 0–99)
LYMPHOCYTES ABSOLUTE: 1.45 E9/L (ref 1.5–4)
LYMPHOCYTES RELATIVE PERCENT: 36 % (ref 20–42)
MCH RBC QN AUTO: 29.8 PG (ref 26–35)
MCHC RBC AUTO-ENTMCNC: 32.2 % (ref 32–34.5)
MCV RBC AUTO: 92.6 FL (ref 80–99.9)
MONOCYTES ABSOLUTE: 0.35 E9/L (ref 0.1–0.95)
MONOCYTES RELATIVE PERCENT: 8.7 % (ref 2–12)
NEUTROPHILS ABSOLUTE: 2.1 E9/L (ref 1.8–7.3)
NEUTROPHILS RELATIVE PERCENT: 52.1 % (ref 43–80)
PDW BLD-RTO: 13.2 FL (ref 11.5–15)
PLATELET # BLD: 150 E9/L (ref 130–450)
PMV BLD AUTO: 11.2 FL (ref 7–12)
RBC # BLD: 4.3 E12/L (ref 3.5–5.5)
T4 FREE: 1.24 NG/DL (ref 0.93–1.7)
TRIGL SERPL-MCNC: 45 MG/DL (ref 0–149)
TSH SERPL DL<=0.05 MIU/L-ACNC: 3.08 UIU/ML (ref 0.27–4.2)
VLDLC SERPL CALC-MCNC: 9 MG/DL
WBC # BLD: 4 E9/L (ref 4.5–11.5)

## 2022-04-26 ENCOUNTER — OFFICE VISIT (OUTPATIENT)
Dept: FAMILY MEDICINE CLINIC | Age: 72
End: 2022-04-26
Payer: MEDICARE

## 2022-04-26 VITALS
WEIGHT: 188.05 LBS | TEMPERATURE: 98.2 F | OXYGEN SATURATION: 98 % | HEART RATE: 70 BPM | HEIGHT: 64 IN | SYSTOLIC BLOOD PRESSURE: 130 MMHG | BODY MASS INDEX: 32.11 KG/M2 | DIASTOLIC BLOOD PRESSURE: 82 MMHG

## 2022-04-26 VITALS
HEART RATE: 70 BPM | BODY MASS INDEX: 32.1 KG/M2 | WEIGHT: 188 LBS | HEIGHT: 64 IN | OXYGEN SATURATION: 98 % | DIASTOLIC BLOOD PRESSURE: 82 MMHG | TEMPERATURE: 98.2 F | SYSTOLIC BLOOD PRESSURE: 130 MMHG

## 2022-04-26 DIAGNOSIS — R39.15 URINARY URGENCY: ICD-10-CM

## 2022-04-26 DIAGNOSIS — Z12.31 ENCOUNTER FOR SCREENING MAMMOGRAM FOR BREAST CANCER: ICD-10-CM

## 2022-04-26 DIAGNOSIS — K21.9 GASTROESOPHAGEAL REFLUX DISEASE WITHOUT ESOPHAGITIS: ICD-10-CM

## 2022-04-26 DIAGNOSIS — I10 ESSENTIAL HYPERTENSION: Primary | ICD-10-CM

## 2022-04-26 DIAGNOSIS — Z00.00 MEDICARE ANNUAL WELLNESS VISIT, SUBSEQUENT: Primary | ICD-10-CM

## 2022-04-26 DIAGNOSIS — Z23 IMMUNIZATION DUE: ICD-10-CM

## 2022-04-26 DIAGNOSIS — E78.2 MIXED HYPERLIPIDEMIA: ICD-10-CM

## 2022-04-26 DIAGNOSIS — M85.80 OSTEOPENIA AFTER MENOPAUSE: ICD-10-CM

## 2022-04-26 DIAGNOSIS — E03.9 ACQUIRED HYPOTHYROIDISM: ICD-10-CM

## 2022-04-26 DIAGNOSIS — Z78.0 OSTEOPENIA AFTER MENOPAUSE: ICD-10-CM

## 2022-04-26 PROBLEM — I20.8 ANGINA AT REST (HCC): Status: ACTIVE | Noted: 2022-04-26

## 2022-04-26 PROBLEM — I20.89 ANGINA AT REST: Status: ACTIVE | Noted: 2022-04-26

## 2022-04-26 PROBLEM — I20.89 ANGINA AT REST: Status: RESOLVED | Noted: 2022-04-26 | Resolved: 2022-04-26

## 2022-04-26 PROBLEM — I20.8 ANGINA AT REST (HCC): Status: RESOLVED | Noted: 2022-04-26 | Resolved: 2022-04-26

## 2022-04-26 PROCEDURE — 99214 OFFICE O/P EST MOD 30 MIN: CPT | Performed by: FAMILY MEDICINE

## 2022-04-26 PROCEDURE — G0439 PPPS, SUBSEQ VISIT: HCPCS | Performed by: FAMILY MEDICINE

## 2022-04-26 PROCEDURE — 3288F FALL RISK ASSESSMENT DOCD: CPT | Performed by: FAMILY MEDICINE

## 2022-04-26 RX ORDER — LEVOTHYROXINE SODIUM 0.07 MG/1
75 TABLET ORAL DAILY
Qty: 90 TABLET | Refills: 1 | Status: SHIPPED
Start: 2022-04-26 | End: 2022-10-18 | Stop reason: SDUPTHER

## 2022-04-26 RX ORDER — VERAPAMIL HYDROCHLORIDE 120 MG/1
120 TABLET, FILM COATED ORAL 2 TIMES DAILY
Qty: 180 TABLET | Refills: 1 | Status: SHIPPED
Start: 2022-04-26 | End: 2022-10-18 | Stop reason: SDUPTHER

## 2022-04-26 RX ORDER — PANTOPRAZOLE SODIUM 40 MG/1
40 TABLET, DELAYED RELEASE ORAL DAILY
Qty: 90 TABLET | Refills: 1 | Status: SHIPPED
Start: 2022-04-26 | End: 2022-10-18 | Stop reason: SDUPTHER

## 2022-04-26 RX ORDER — ROSUVASTATIN CALCIUM 20 MG/1
TABLET, COATED ORAL
Qty: 18 TABLET | Refills: 1 | Status: SHIPPED
Start: 2022-04-26 | End: 2022-10-18 | Stop reason: SDUPTHER

## 2022-04-26 ASSESSMENT — PATIENT HEALTH QUESTIONNAIRE - PHQ9
SUM OF ALL RESPONSES TO PHQ QUESTIONS 1-9: 0
SUM OF ALL RESPONSES TO PHQ9 QUESTIONS 1 & 2: 0
1. LITTLE INTEREST OR PLEASURE IN DOING THINGS: 0
SUM OF ALL RESPONSES TO PHQ QUESTIONS 1-9: 0
1. LITTLE INTEREST OR PLEASURE IN DOING THINGS: 0
SUM OF ALL RESPONSES TO PHQ9 QUESTIONS 1 & 2: 0
2. FEELING DOWN, DEPRESSED OR HOPELESS: 0
SUM OF ALL RESPONSES TO PHQ QUESTIONS 1-9: 0
2. FEELING DOWN, DEPRESSED OR HOPELESS: 0

## 2022-04-26 ASSESSMENT — ENCOUNTER SYMPTOMS
SHORTNESS OF BREATH: 0
DIARRHEA: 0
EYE PAIN: 0
BACK PAIN: 0
SINUS PAIN: 0
WHEEZING: 0
NAUSEA: 0
CONSTIPATION: 0
SORE THROAT: 0
COUGH: 0
CHEST TIGHTNESS: 0
ABDOMINAL PAIN: 0
VOMITING: 0
TROUBLE SWALLOWING: 0

## 2022-04-26 ASSESSMENT — LIFESTYLE VARIABLES
HOW OFTEN DURING THE LAST YEAR HAVE YOU FAILED TO DO WHAT WAS NORMALLY EXPECTED FROM YOU BECAUSE OF DRINKING: 0
HOW OFTEN DURING THE LAST YEAR HAVE YOU HAD A FEELING OF GUILT OR REMORSE AFTER DRINKING: 0
HOW OFTEN DURING THE LAST YEAR HAVE YOU NEEDED AN ALCOHOLIC DRINK FIRST THING IN THE MORNING TO GET YOURSELF GOING AFTER A NIGHT OF HEAVY DRINKING: 0
HAVE YOU OR SOMEONE ELSE BEEN INJURED AS A RESULT OF YOUR DRINKING: 0
HOW OFTEN DURING THE LAST YEAR HAVE YOU FOUND THAT YOU WERE NOT ABLE TO STOP DRINKING ONCE YOU HAD STARTED: 0
HAS A RELATIVE, FRIEND, DOCTOR, OR ANOTHER HEALTH PROFESSIONAL EXPRESSED CONCERN ABOUT YOUR DRINKING OR SUGGESTED YOU CUT DOWN: 0
HOW OFTEN DO YOU HAVE A DRINK CONTAINING ALCOHOL: 4 OR MORE TIMES A WEEK
HOW MANY STANDARD DRINKS CONTAINING ALCOHOL DO YOU HAVE ON A TYPICAL DAY: 1 OR 2
HOW OFTEN DURING THE LAST YEAR HAVE YOU BEEN UNABLE TO REMEMBER WHAT HAPPENED THE NIGHT BEFORE BECAUSE YOU HAD BEEN DRINKING: 0

## 2022-04-26 NOTE — PROGRESS NOTES
Medicare Annual Wellness Visit    Placido Cleveland is here for Medicare AWV    Assessment & Plan   Medicare annual wellness visit, subsequent      Recommendations for Preventive Services Due: see orders and patient instructions/AVS.  Recommended screening schedule for the next 5-10 years is provided to the patient in written form: see Patient Instructions/AVS.     Return for Medicare Annual Wellness Visit in 1 year. Subjective   The following acute and/or chronic problems were also addressed today:  Blood presures and hearing    Patient's complete Health Risk Assessment and screening values have been reviewed and are found in Flowsheets. The following problems were reviewed today and where indicated follow up appointments were made and/or referrals ordered.     Positive Risk Factor Screenings with Interventions:    Fall Risk:  Do you feel unsteady or are you worried about falling? : (!) yes  2 or more falls in past year?: no  Fall with injury in past year?: no     Fall Risk Interventions:    · Home safety tips provided  · Home exercises provided to promote strength and balance              General Health and ACP:  General  In general, how would you say your health is?: Good  In the past 7 days, have you experienced any of the following: New or Increased Pain, New or Increased Fatigue, Loneliness, Social Isolation, Stress or Anger?: No  Do you get the social and emotional support that you need?: Yes  Do you have a Living Will?: Yes    Advance Directives     Power of  Living Will ACP-Advance Directive ACP-Power of     Not on File Not on File Not on File Not on File      General Health Risk Interventions:  · No Living Will: ACP documents already completed- patient asked to provide copy to the office    Health Habits/Nutrition:     Physical Activity: Inactive    Days of Exercise per Week: 0 days    Minutes of Exercise per Session: 0 min     Have you lost any weight without trying in the past 3 months?: No  Body mass index: (!) 32.28  Have you seen the dentist within the past year?: Yes    Health Habits/Nutrition Interventions:  · Inadequate physical activity:  patient agrees to wear a pedometer and walk at least 10,000 steps/day, educational materials provided to promote increased physical activity  · Nutritional issues:  educational materials for healthy, well-balanced diet provided, educational materials to promote weight loss provided             Objective   Vitals:    04/26/22 1233   BP: 130/82   Pulse: 70   Temp: 98.2 °F (36.8 °C)   SpO2: 98%   Weight: 188 lb 0.8 oz (85.3 kg)   Height: 5' 4\" (1.626 m)      Body mass index is 32.28 kg/m².         General Appearance: alert and oriented to person, place and time, well developed and well- nourished, in no acute distress  Skin: warm and dry, no rash or erythema  Head: normocephalic and atraumatic  Eyes: pupils equal, round, and reactive to light, extraocular eye movements intact, conjunctivae normal  ENT: tympanic membrane, external ear and ear canal normal bilaterally, nose without deformity, nasal mucosa and turbinates normal without polyps  Neck: supple and non-tender without mass, no thyromegaly or thyroid nodules, no cervical lymphadenopathy  Pulmonary/Chest: clear to auscultation bilaterally- no wheezes, rales or rhonchi, normal air movement, no respiratory distress  Cardiovascular: normal rate, regular rhythm, normal S1 and S2, no murmurs, rubs, clicks, or gallops, distal pulses intact, no carotid bruits  Abdomen: soft, non-tender, non-distended, normal bowel sounds, no masses or organomegaly  Extremities: no cyanosis, clubbing or edema  Musculoskeletal: normal range of motion, no joint swelling, deformity or tenderness  Neurologic: reflexes normal and symmetric, no cranial nerve deficit, gait, coordination and speech normal       Allergies   Allergen Reactions    Erythromycin     Sulfa Antibiotics      Prior to Visit Medications    Medication Sig Taking?  Authorizing Provider   levothyroxine (SYNTHROID) 75 MCG tablet Take 1 tablet by mouth daily  Cameron Sierra DO   pantoprazole (PROTONIX) 40 MG tablet Take 1 tablet by mouth daily  Cameron Sierra DO   rosuvastatin (CRESTOR) 20 MG tablet 2 times a week  Olrin Boyce,    verapamil (CALAN) 120 MG tablet Take 1 tablet by mouth 2 times daily  Orlin Boyce,    clobetasol (TEMOVATE) 0.05 % ointment APPLY TO AFFECTED AREA THREE TIMES WEEKLY  Historical Provider, MD   fluocinonide (LIDEX) 0.05 % ointment   Historical Provider, MD   Calcium Citrate (CITRACAL PO) Take by mouth  Historical Provider, MD   aspirin 325 MG EC tablet Take 325 mg by mouth daily  Historical Provider, MD White (Including outside providers/suppliers regularly involved in providing care):   Patient Care Team:  Cameron Sierra DO as PCP - [address] [place], [place] [address]DO as PCP - [facility] Empaneled Provider    Reviewed and updated this visit:  Tobacco  Allergies  Meds  Problems  Med Hx  Surg Hx  Soc Hx  Fam Hx

## 2022-04-26 NOTE — PROGRESS NOTES
22    Name: Adonay Nicolas  :1950   Sex:female   Age:71 y.o. Chief Complaint   Patient presents with    Hypothyroidism    Hyperlipidemia    Hypertension     Patient presents to office for visit. She did have labs done. Patient started checking her blood pressure at home because she had a high reading at her appointment with Dr. Luis Kamara. Patient says her readings at home were 118 to 119 over 70's to 80's in the morning and she may have a reading in the 130's if she checks her blood pressure at night. Patient says she does have issues urinary frequency and getting up at night and incontinence if she doesn't get to the restroom soon enough. Her gyn had talked about treatment with patient for this urinary issue and patient did not want to pursue anything at that time. She has appointment with gyn this summer and will discuss treatment options then. Patient says she is fearful of falling ever since she fell about a year ago. She tripped over something in her yard and she does feel unsteady on her feet sometimes. Patient takes her crestor twice a week. Patient says she does have hearing trouble and she has been evaluated for this.      Patient h ere for check up  She has been doing well    HTN  Her blood pressures were pretty high at neurology office but she admits she was vey nervous  She has been checking them off and on at home before and after that visit and they are good there  Would not make any adjustments at this time  It may bottom her out at home    Labs reviewed at length  Lipids stable even with the crestor just 2 times a week  She tolerates it and LDL staying <100  Just needs to continue to watch diet    Mammogram due in July  dexa will be due in , continue vitamin D, calcium and weight bearing exercise  Pneumovax 23 will be due as well at next appt'    Urinary issues  Urgency  kagels  More exercise  Weight loss even just 5% would help this as well, less pressure on the bladder            Review of Systems   Constitutional: Negative for appetite change, fatigue and fever. HENT: Negative for congestion, ear pain, sinus pain, sore throat and trouble swallowing. Eyes: Negative for pain. Respiratory: Negative for cough, chest tightness, shortness of breath and wheezing. Cardiovascular: Negative for chest pain, palpitations and leg swelling. Gastrointestinal: Negative for abdominal pain, constipation, diarrhea, nausea and vomiting. Endocrine: Negative for cold intolerance and heat intolerance. Genitourinary: Positive for frequency. Negative for difficulty urinating, dysuria, hematuria, pelvic pain and urgency. Musculoskeletal: Negative for arthralgias, back pain, gait problem, joint swelling and myalgias. Skin: Negative for rash and wound. Neurological: Negative for dizziness, syncope, light-headedness and headaches. Hematological: Negative for adenopathy. Psychiatric/Behavioral: Negative for confusion, dysphoric mood, self-injury, sleep disturbance and suicidal ideas. The patient is not nervous/anxious.             Current Outpatient Medications:     levothyroxine (SYNTHROID) 75 MCG tablet, Take 1 tablet by mouth daily, Disp: 90 tablet, Rfl: 1    pantoprazole (PROTONIX) 40 MG tablet, Take 1 tablet by mouth daily, Disp: 90 tablet, Rfl: 1    rosuvastatin (CRESTOR) 20 MG tablet, 2 times a week, Disp: 18 tablet, Rfl: 1    verapamil (CALAN) 120 MG tablet, Take 1 tablet by mouth 2 times daily, Disp: 180 tablet, Rfl: 1    clobetasol (TEMOVATE) 0.05 % ointment, APPLY TO AFFECTED AREA THREE TIMES WEEKLY, Disp: , Rfl:     fluocinonide (LIDEX) 0.05 % ointment, , Disp: , Rfl:     Calcium Citrate (CITRACAL PO), Take by mouth, Disp: , Rfl:     aspirin 325 MG EC tablet, Take 325 mg by mouth daily, Disp: , Rfl:   Allergies   Allergen Reactions    Erythromycin     Sulfa Antibiotics       Past Medical History:   Diagnosis Date    [facility]     [facility]neur[facility]m ([facility] 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, with stent in left internal  carotid artery    Cerebral artery occlusion with cerebral infarction ([address]     GERD (gastroesophageal reflux disease)     Headache     Hyperlipidemia     Hypertension     Hypothyroidism     Occipital neuralgia 4/12/2022    Osteoporosis     Thyroid disease      Patient Active Problem List    Diagnosis Date Noted    Osteopenia after menopause 04/26/2022    Occipital neuralgia 04/12/2022    Poison ivy dermatitis 07/06/2021    Vitamin D deficiency 03/19/2020    Hyperlipidemia 09/25/2019    Recurrent occipital headache 08/06/2019    Hypothyroidism 08/06/2019    GERD (gastroesophageal reflux disease) 08/06/2019    Peroneal tendinitis of right lower extremity 05/30/2019    Pain in right foot 05/14/2019    Essential hypertension 11/14/2016    Cerebral aneurysm, nonruptured 05/21/2009      Past Surgical History:   Procedure Laterality Date    CAROTID ENDARTERECTOMY      HEMORRHOID SURGERY      HYSTERECTOMY      Partial at age 30   Dorena Esperanza JOINT REPLACEMENT Bilateral     knee    KNEE ARTHROPLASTY Bilateral 2010    left sided stroke afterward, residual rt side weakness    OVARY REMOVAL      at age 26      Social History     Tobacco History     Smoking Status  Former Smoker Smoking Start Date  1/1/1971 Quit date  1/1/1981 Smoking Frequency  1 pack/day for 10 years (10 pk yrs)    Smoking Tobacco Type  Cigarettes    Smokeless Tobacco Use  Never Used          Alcohol History     Alcohol Use Status  Yes Comment  occas g;ass of wine          Drug Use     Drug Use Status  No          Sexual Activity     Sexually Active  Not Currently Partners  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 Constitutional:       General: She is not in acute distress.      Appearance: She is well-developed. She is not ill-appearing. HENT:      Head: Normocephalic and atraumatic. Right Ear: Tympanic membrane normal.      Left Ear: Tympanic membrane normal.      Nose: Nose normal.      Mouth/Throat:      Mouth: Mucous membranes are moist.   Eyes:      Pupils: Pupils are equal, round, and reactive to light. Cardiovascular:      Rate and Rhythm: Normal rate and regular rhythm. Pulmonary:      Effort: Pulmonary effort is normal.      Breath sounds: Normal breath sounds. Abdominal:      General: Bowel sounds are normal.      Palpations: Abdomen is soft. Musculoskeletal:      Cervical back: Normal range of motion. Comments: Gait steady, balance fair, recommended balance exercises   Skin:     General: Skin is warm and dry. Neurological:      Mental Status: She is alert and oriented to person, place, and time. Mental status is at baseline. Psychiatric:         Mood and Affect: Mood normal.         Thought Content: Thought content normal.          Sandor Dominguez was seen today for hypothyroidism, hyperlipidemia and hypertension. Diagnoses and all orders for this visit:    Essential hypertension  Comments:  blood pressures much better at home 120/70's mostly  no higher readings, feeling much better  stress test reviewed again,  continue verapamil  120mg twice a day  Orders:  -     verapamil (CALAN) 120 MG tablet; Take 1 tablet by mouth 2 times daily  -     CBC with Auto Differential; Future  -     Comprehensive Metabolic Panel; Future  -     Microalbumin, Ur; Future    Acquired hypothyroidism  Comments:  stable  labs reviwed no changes  Orders:  -     levothyroxine (SYNTHROID) 75 MCG tablet; Take 1 tablet by mouth daily  -     T4, Free; Future  -     TSH; Future    Gastroesophageal reflux disease without esophagitis  Comments:  with diet changes has been great  pantoprazoel working with no issues  Orders:  -     pantoprazole (PROTONIX) 40 MG tablet;  Take 1 tablet by mouth daily    Mixed hyperlipidemia  Comments:  improved with just crestor twice a week'  will continue this  nosisde effects, no leg cramps  Orders:  -     rosuvastatin (CRESTOR) 20 MG tablet; 2 times a week  -     Lipid Panel; Future    Encounter for screening mammogram for breast cancer  Comments:  recommend mammogram in july, she is seeing new GYN in june    Osteopenia after menopause  Comments:  continue calcium &vit D will be due for dexa after 12/2022  may want to repeat to be sure it is nor worsening  have recommended wgt bearing exercise in past    Immunization due  Comments:  pneumovax 23 due at next appt    Urinary urgency  Comments:  kagels and weight loss-5%  would both be very helpful      d/w patient to continue to monitor bp at home  Her readings h ave been great  If any elevated she will follow up immediately  Watching diet, low salt      I independently reviewed and updated the chief complaint, HPI, past medical and surgical history, medications, allergies and ROS as entered by the LPN. Seen by:   Paul Tang, DO

## 2022-04-26 NOTE — PATIENT INSTRUCTIONS
Personalized Preventive Plan for Jose Alfredo Rosenberg - 4/26/2022  Medicare offers a range of preventive health benefits. Some of the tests and screenings are paid in full while other may be subject to a deductible, co-insurance, and/or copay. Some of these benefits include a comprehensive review of your medical history including lifestyle, illnesses that may run in your family, and various assessments and screenings as appropriate. After reviewing your medical record and screening and assessments performed today your provider may have ordered immunizations, labs, imaging, and/or referrals for you. A list of these orders (if applicable) as well as your Preventive Care list are included within your After Visit Summary for your review. Other Preventive Recommendations:    · A preventive eye exam performed by an eye specialist is recommended every 1-2 years to screen for glaucoma; cataracts, macular degeneration, and other eye disorders. · A preventive dental visit is recommended every 6 months. · Try to get at least 150 minutes of exercise per week or 10,000 steps per day on a pedometer . · Order or download the FREE \"Exercise & Physical Activity: Your Everyday Guide\" from [employer]. Call 7-397.202.7155 or search [employer] online. · You need 9361-6071 mg of calcium and 0580-7183 IU of vitamin D per day. It is possible to meet your calcium requirement with diet alone, but a vitamin D supplement is usually necessary to meet this goal.  · When exposed to the sun, use a sunscreen that protects against both UVA and UVB radiation with an SPF of 30 or greater. Reapply every 2 to 3 hours or after sweating, drying off with a towel, or swimming. · Always wear a seat belt when traveling in a car. Always wear a helmet when riding a bicycle or motorcycle.

## 2022-05-02 ENCOUNTER — TELEPHONE (OUTPATIENT)
Dept: NEUROLOGY | Age: 72
End: 2022-05-02

## 2022-05-02 NOTE — TELEPHONE ENCOUNTER
Message noted. Cervical spine x-ray report shows moderate degree of degenerative changes consistent with spondylosis at C5-6, C6-7 levels, I.e., mid to lower cervical spine.

## 2022-05-18 ENCOUNTER — OFFICE VISIT (OUTPATIENT)
Dept: PAIN MANAGEMENT | Age: 72
End: 2022-05-18
Payer: MEDICARE

## 2022-05-18 VITALS
DIASTOLIC BLOOD PRESSURE: 88 MMHG | WEIGHT: 190 LBS | HEART RATE: 74 BPM | SYSTOLIC BLOOD PRESSURE: 159 MMHG | RESPIRATION RATE: 16 BRPM | TEMPERATURE: 95.5 F | HEIGHT: 64 IN | OXYGEN SATURATION: 98 % | BODY MASS INDEX: 32.44 KG/M2

## 2022-05-18 DIAGNOSIS — G89.4 CHRONIC PAIN SYNDROME: Primary | ICD-10-CM

## 2022-05-18 DIAGNOSIS — M54.2 CERVICALGIA: ICD-10-CM

## 2022-05-18 DIAGNOSIS — M79.10 MYALGIA: ICD-10-CM

## 2022-05-18 DIAGNOSIS — M50.90 CERVICAL DISC DISORDER: ICD-10-CM

## 2022-05-18 PROCEDURE — 99204 OFFICE O/P NEW MOD 45 MIN: CPT | Performed by: PAIN MEDICINE

## 2022-05-18 PROCEDURE — 99204 OFFICE O/P NEW MOD 45 MIN: CPT

## 2022-05-18 NOTE — PROGRESS NOTES
Do you currently have any of the following:    Fever: No  Headache:  No  Cough: No  Shortness of breath: No  Exposed to anyone with these symptoms: No         Donny Valencia presents to the [facility] on 5/18/2022. Nell Dupree is complaining headaches The pain is persistent. The pain is described as throbbing and increases in severity. Pain is rated on her best day at a 1, on her worst day at a 9, and on average at a 4 on the VAS scale. She reports that she is not taking anything for pain. Any procedures since your last visit: yes she had several nerve blocks in the past, with 1st one she had 100% relief and 2nd one she had 0% relief. Initially it was gone but came right back. Pacemaker or defibrillator: No.    She is not on NSAIDS and is  on anticoagulation medications to include ASA and is managed by Dr. Javad Juarez. Medication Contract and Consent for Opioid Use Documents Filed      No documents found                BP (!) 159/88   Pulse 74   Temp 95.5 °F (35.3 °C)   Resp 16   Ht 5' 4\" (1.626 m)   Wt 190 lb (86.2 kg)   SpO2 98%   BMI 32.61 kg/m²      No LMP recorded. Patient has had a hysterectomy.

## 2022-05-18 NOTE — PROGRESS NOTES
[facility]        [address]  [facility], [address] [facility]  Dept: [phone]          Consult Note      Patient:  JRAED Earl 1950    Date of Service:  22     Requesting Physician:  Nhi Kinsey MD    Reason for Consult:      Patient presents with complaints of head pain that started >20 years ago    HISTORY OF PRESENT ILLNESS:      Pain is intermittent and is described as throbbing. Pain does not radiate to the upper extremities. She  does not have numbness, tingling, weakness of the the upper extremities and does not have bladder or bowel dysfunction. Alleviating factors include: occipital nerve block, tiznidine. Aggravating factors include:  nothing. She has been on anticoagulation medications to include ASA and has not been on herbal supplements. She is not diabetic. Imagin/2022 xray cervical -  FINDINGS:   There is generalized osteopenia.  Vertebral body height and alignment are   normal.  There is moderate disc space narrowing at C5-6 and C6-7 with some   posterior spurs.  There is mild bilateral foraminal narrowing at C5-6. Remainder of the cervical spine is unremarkable.           Impression   Moderate degenerative change at C5-6 and C6-7     2022 CT head -     FINDINGS:   BRAIN/VENTRICLES: There is no acute intracranial hemorrhage, mass effect or   midline shift.  No abnormal extra-axial fluid collection.  The gray-white   differentiation is maintained without evidence of an acute infarct.  There is   no evidence of hydrocephalus. Periventricular and subcortical white matter   hypoattenuation, consistent microangiopathic change.  There is   encephalomalacia in the left frontal and parietal lobes.  Atherosclerosis of   the intracranial vasculature.  Prior left supraclinoid ICA aneurysm coiling.    No abnormal enhancement.       ORBITS: The visualized portion of the orbits demonstrate no acute abnormality.       SINUSES: Partial opacification of the left sphenoid sinus with layering fluid   level.  Visualized mastoid air cells are clear.       SOFT TISSUES/SKULL:  No acute abnormality of the visualized skull or soft   tissues.           Impression   1. No acute intracranial abnormality.  No abnormal enhancement. 2. Left frontal and parietal encephalomalacia. 3. Prior left supraclinoid ICA aneurysm coiling. Previous treatments: occipital nerve block, PT and medications. Past Medical History:   Diagnosis Date    Aneurysm ([address]     Aneurysm ([address]     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, with stent in left internal  carotid artery    Cerebral artery occlusion with cerebral infarction ([address]     GERD (gastroesophageal reflux disease)     Headache     Hyperlipidemia     Hypertension     Hypothyroidism     Occipital neuralgia 4/12/2022    Osteoporosis     Thyroid disease        Past Surgical History:   Procedure Laterality Date    CAROTID ENDARTERECTOMY      HEMORRHOID SURGERY      HYSTERECTOMY      Partial at age 30   [place] JOINT REPLACEMENT Bilateral     knee    KNEE ARTHROPLASTY Bilateral 2010    left sided stroke afterward, residual rt side weakness    OVARY REMOVAL      at age 26       Prior to Admission medications    Medication Sig Start Date End Date Taking?  Authorizing Provider   levothyroxine (SYNTHROID) 75 MCG tablet Take 1 tablet by mouth daily 4/26/22  Yes Ashly Astorga DO   pantoprazole (PROTONIX) 40 MG tablet Take 1 tablet by mouth daily 4/26/22  Yes Ashly Astorga DO   rosuvastatin (CRESTOR) 20 MG tablet 2 times a week 4/26/22  Yes Lucy Pena DO   verapamil (CALAN) 120 MG tablet Take 1 tablet by mouth 2 times daily 4/26/22  Yes Lucy Pena, DO   clobetasol (TEMOVATE) 0.05 % ointment Apply topically as needed  9/7/21  Yes Historical Provider, MD   fluocinonide (LIDEX) 0.05 % ointment Apply topically as needed  3/2/21  Yes Historical Provider, MD   Calcium Citrate (CITRACAL PO) Take by mouth   Yes Historical Provider, MD   aspirin 325 MG EC tablet Take 325 mg by mouth daily   Yes Historical Provider, MD       Allergies   Allergen Reactions    Erythromycin     Sulfa Antibiotics        Social History     Socioeconomic History    Marital status:      Spouse name: Not on file    Number of children: Not on file    Years of education: Not on file    Highest education level: Not on file   Occupational History    Not on file   Tobacco Use    Smoking status: Former Smoker     Packs/day: 1.00     Years: 10.00     Pack years: 10.00     Types: Cigarettes     Start date:      Quit date: 1981     Years since quittin.4    Smokeless tobacco: Never Used   Vaping Use    Vaping Use: Never used   Substance and Sexual Activity    Alcohol use: Yes     Comment: occas g;ass of wine    Drug use: No    Sexual activity: Not Currently     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   Other Topics Concern    Not on file   Social History Narrative    Not on file     Social Determinants of Health     Financial Resource Strain:     Difficulty of Paying Living Expenses: Not on file   Food Insecurity:     Worried About [address] [employer] in the Last Year: Not on file    [address] in the Last Year: Not on file   Transportation Needs:     Lack of Transportation (Medical): Not on file    Lack of Transportation (Non-Medical):  Not on file   Physical Activity: Inactive    Days of Exercise per Week: 0 days    Minutes of Exercise per Session: 0 min   Stress:     Feeling of Stress : Not on file   Social Connections:     Frequency of Communication with Friends and Family: Not on file    Frequency of Social Gatherings with Friends and Family: Not on file    Attends Latter-day Services: Not on file    Active Member of Clubs or Organizations: Not on file    Attends Club or Organization Meetings: Not on file    Marital Status: Not on file   Intimate Partner Violence:     Fear of Current or Ex-Partner: Not on file    Emotionally Abused: Not on file    Physically Abused: Not on file    Sexually Abused: Not on file   Housing Stability:     Unable to Pay for Housing in the Last Year: Not on file    Number of Places Lived in the Last Year: Not on file    Unstable Housing in the Last Year: Not on file       Family History   Problem Relation Age of Onset    Heart Failure Mother     Breast Cancer Maternal Grandmother     Breast Cancer Maternal Aunt        REVIEW OF SYSTEMS:     Patient specifically denies fever/chills, chest pain, shortness of breath, new bowel or bladder complaints. All other review of systems was negative. PHYSICAL EXAMINATION:      BP (!) 159/88   Pulse 74   Temp 95.5 °F (35.3 °C)   Resp 16   Ht 5' 4\" (1.626 m)   Wt 190 lb (86.2 kg)   SpO2 98%   BMI 32.61 kg/m²     General:      General appearance:pleasant and well-hydrated, in no distress and A & O x3  Build:Normal Weight  Function:Rises from a seated position easily. HEENT:    Head:normocephalic, atraumatic  Pupils:regular, round, equal  Sclera: icterus absent    Lungs:    Breathing:normal breathing pattern    Abdomen:    Shape:non-distended and normal  Tenderness:none  Guarding:none    Cervical spine:    Inspection:normal  Palpation:tenderness paravertebral muscles, tenderness trapezium, left, right negative. Range of motion:abnormal mildly in flexion, extension rotation bilateral and is not painful.     Musculoskeletal:    Trigger points in trapezius:absent bilaterally  Trigger points in rhomboids:absent bilaterally  Trigger points in Paraveteral:absent bilaterally  Trigger points in supraspinatus/infraspinatus:absent  Spurling's:negative right, negative left    Jessica's:negative right, negative left     Extremities:    Tremors:None bilaterally upper and lower  Range of motion:Generally normal shoulders  Intact:Yes  Varicose veins:absent   Pulses:present Lt radial  Cyanosis:none  Edema:none x all 4 extremities    Neurological:    CNII-XII grossly intact  Sensory:normal to light touch BUE    Motor:     Right Grip5/5              Left Grip5/5               Right Bicep5/5           Left Bicep5/5              Right Triceps5/5       Left Triceps5/5          Right Deltoid5/5     Left Deltoid5/5                    Reflexes:      Right Brachioradialis reflex2+  Left Brachioradialis reflex2+  Right Biceps reflex2+  Left Biceps reflex2+  Right Triceps reflex2+  Left Triceps reflex2+    Gait:normal    Dermatology:    Skin:no rashes or lesions noted    Impression:    Currently not having headache or cervical pain  Chronic intermittent headaches since early 2000's  Had GONB x2, first one helpful, second one not helpful  Exam by Dr. Lynn Mejia showed some cervical paraspinal and occipital muscle tension    Plan:    Reviewed [facility] headache notes  Reviewed referral documents and imaging  Urine screen deferred  OARRS report reviewed  Consider GONB vs TPI when pain returns  Has done PT multiple times, inconsistently helpful  Continues with twice monthly therapeutic massage  Patient encouraged to stay active   Treatment plan discussed with the patient including medication and procedure side effects     CC:  Referring physician    EMILY Ann.

## 2022-06-30 ENCOUNTER — OFFICE VISIT (OUTPATIENT)
Dept: FAMILY MEDICINE CLINIC | Age: 72
End: 2022-06-30
Payer: MEDICARE

## 2022-06-30 VITALS
OXYGEN SATURATION: 98 % | HEIGHT: 64 IN | TEMPERATURE: 97.5 F | WEIGHT: 198 LBS | BODY MASS INDEX: 33.8 KG/M2 | HEART RATE: 83 BPM | SYSTOLIC BLOOD PRESSURE: 136 MMHG | DIASTOLIC BLOOD PRESSURE: 70 MMHG
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
## 2022-06-30 PROCEDURE — 1123F ACP DISCUSS/DSCN MKR DOCD: CPT | Performed by: FAMILY MEDICINE

## 2022-06-30 PROCEDURE — 99213 OFFICE O/P EST LOW 20 MIN: CPT | Performed by: FAMILY MEDICINE

## 2022-06-30 RX ORDER — AMOXICILLIN AND CLAVULANATE POTASSIUM 875; 125 MG/1; MG/1
1 TABLET, FILM COATED ORAL 2 TIMES DAILY
Qty: 20 TABLET | Refills: 0 | Status: SHIPPED | OUTPATIENT
Start: 2022-06-30 | End: 2022-07-10

## 2022-06-30 NOTE — PROGRESS NOTES
Satish Corry was seen today for animal bite. Diagnoses and all orders for this visit:    Cat bite, initial encounter  -     amoxicillin-clavulanate (AUGMENTIN) 875-125 MG per tablet;  Take 1 tablet by mouth 2 times daily for 10 days         Kayren Kawasaki, MD

## 2022-07-07 ENCOUNTER — TELEPHONE (OUTPATIENT)
Dept: FAMILY MEDICINE CLINIC | Age: 72
End: 2022-07-07

## 2022-07-07 DIAGNOSIS — Z12.31 ENCOUNTER FOR SCREENING MAMMOGRAM FOR BREAST CANCER: Primary | ICD-10-CM

## 2022-07-07 NOTE — TELEPHONE ENCOUNTER
----- Message from Barrington Leventhal sent at 7/7/2022  1:11 PM EDT -----  Subject: Referral Request    Reason for referral request? pt would like to have a mammogram done   Provider patient wants to be referred to(if known):     Provider Phone Number(if known):     Additional Information for Provider?   ---------------------------------------------------------------------------  --------------  [id number]    [phone]; OK to leave message on voicemail  ---------------------------------------------------------------------------  --------------

## 2022-07-11 ENCOUNTER — TELEPHONE (OUTPATIENT)
Dept: FAMILY MEDICINE CLINIC | Age: 72
End: 2022-07-11

## 2022-07-11 ENCOUNTER — OFFICE VISIT (OUTPATIENT)
Dept: FAMILY MEDICINE CLINIC | Age: 72
End: 2022-07-11
Payer: MEDICARE

## 2022-07-11 VITALS
SYSTOLIC BLOOD PRESSURE: 112 MMHG | OXYGEN SATURATION: 97 % | HEART RATE: 66 BPM | DIASTOLIC BLOOD PRESSURE: 66 MMHG | WEIGHT: 188.05 LBS | TEMPERATURE: 98.4 F | HEIGHT: 64 IN | BODY MASS INDEX: 32.11 KG/M2

## 2022-07-11 DIAGNOSIS — L03.115 CELLULITIS OF RIGHT FOOT: Primary | ICD-10-CM

## 2022-07-11 PROCEDURE — 99213 OFFICE O/P EST LOW 20 MIN: CPT | Performed by: FAMILY MEDICINE

## 2022-07-11 PROCEDURE — 1123F ACP DISCUSS/DSCN MKR DOCD: CPT | Performed by: FAMILY MEDICINE

## 2022-07-11 RX ORDER — DOXYCYCLINE HYCLATE 100 MG
100 TABLET ORAL 2 TIMES DAILY
Qty: 20 TABLET | Refills: 0 | Status: SHIPPED | OUTPATIENT
Start: 2022-07-11 | End: 2022-07-21

## 2022-07-11 ASSESSMENT — ENCOUNTER SYMPTOMS
WHEEZING: 0
SINUS PAIN: 0
ABDOMINAL PAIN: 0
SORE THROAT: 0
BACK PAIN: 0
NAUSEA: 0
VOMITING: 0
DIARRHEA: 0
CHEST TIGHTNESS: 0
SHORTNESS OF BREATH: 0
TROUBLE SWALLOWING: 0
COUGH: 0
EYE PAIN: 0
CONSTIPATION: 0

## 2022-07-11 NOTE — PROGRESS NOTES
22    Name: Jonh Cornelius  :1950   Sex:female   Age:71 y.o. Chief Complaint   Patient presents with    Animal Bite     Patient completed antibiotic (augmentin) this past Saturday for cat bite of right lower leg. Patient still has edema and some redness. She says over all her leg has improved, but she still can not put on a shoe due to swelling. She denies fever. Still redness over area where cat bite was and there is some redness in ankle and foot but still swelligng as well  The foot area is tender to touch  No fevers  No pain in groin  No headaches  But she finished the augmentin so was concerned b/ci t was not completely better          Review of Systems   Constitutional: Negative for appetite change, fatigue and fever. HENT: Negative for congestion, ear pain, sinus pain, sore throat and trouble swallowing. Eyes: Negative for pain. Respiratory: Negative for cough, chest tightness, shortness of breath and wheezing. Cardiovascular: Positive for leg swelling. Negative for chest pain and palpitations. Gastrointestinal: Negative for abdominal pain, constipation, diarrhea, nausea and vomiting. Endocrine: Negative for cold intolerance and heat intolerance. Genitourinary: Negative for difficulty urinating, hematuria and pelvic pain. Musculoskeletal: Negative for back pain, gait problem and joint swelling. Skin: Positive for wound. Negative for rash. Neurological: Negative for dizziness, syncope and headaches. Hematological: Negative for adenopathy. Psychiatric/Behavioral: Negative for confusion, dysphoric mood, self-injury, sleep disturbance and suicidal ideas. The patient is not nervous/anxious.             Current Outpatient Medications:     doxycycline hyclate (VIBRA-TABS) 100 MG tablet, Take 1 tablet by mouth 2 times daily for 10 days, Disp: 20 tablet, Rfl: 0    levothyroxine (SYNTHROID) 75 MCG tablet, Take 1 tablet by mouth daily, Disp: 90 tablet, Rfl: 1   pantoprazole (PROTONIX) 40 MG tablet, Take 1 tablet by mouth daily, Disp: 90 tablet, Rfl: 1    rosuvastatin (CRESTOR) 20 MG tablet, 2 times a week, Disp: 18 tablet, Rfl: 1    verapamil (CALAN) 120 MG tablet, Take 1 tablet by mouth 2 times daily, Disp: 180 tablet, Rfl: 1    clobetasol (TEMOVATE) 0.05 % ointment, Apply topically as needed , Disp: , Rfl:     fluocinonide (LIDEX) 0.05 % ointment, Apply topically as needed , Disp: , Rfl:     Calcium Citrate (CITRACAL PO), Take by mouth, Disp: , Rfl:     aspirin 325 MG EC tablet, Take 325 mg by mouth daily, Disp: , Rfl:   Allergies   Allergen Reactions    Erythromycin     Sulfa Antibiotics       Past Medical History:   Diagnosis Date    [facility] [address]     Aneurysm ([facility] [facility]     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, with stent in left internal  carotid artery    Cerebral artery occlusion with cerebral infarction ([facility]     GERD (gastroesophageal reflux disease)     Headache     Hyperlipidemia     Hypertension     Hypothyroidism     Occipital neuralgia 4/12/2022    Osteoporosis     Thyroid disease      Patient Active Problem List    Diagnosis Date Noted    Chronic pain syndrome 05/18/2022    Cervical disc disorder 05/18/2022    Cervicalgia 05/18/2022    Myalgia 05/18/2022    Osteopenia after menopause 04/26/2022    Occipital neuralgia 04/12/2022    Poison ivy dermatitis 07/06/2021    Vitamin D deficiency 03/19/2020    Hyperlipidemia 09/25/2019    Recurrent occipital headache 08/06/2019    Hypothyroidism 08/06/2019    GERD (gastroesophageal reflux disease) 08/06/2019    Peroneal tendinitis of right lower extremity 05/30/2019    Pain in right foot 05/14/2019    Essential hypertension 11/14/2016    Cerebral aneurysm, nonruptured 05/21/2009      Past Surgical History:   Procedure Laterality Date    CAROTID ENDARTERECTOMY      HEMORRHOID SURGERY      HYSTERECTOMY (CERVIX STATUS UNKNOWN)      Partial at age 30    JOINT REPLACEMENT Bilateral     knee    KNEE ARTHROPLASTY Bilateral 2010    left sided stroke afterward, residual rt side weakness    OVARY REMOVAL      at age 26      Social History     Tobacco History     Smoking Status  Former Smoker Smoking Start Date  1/1/1971 Quit date  1/1/1981 Smoking Frequency  1 pack/day for 10 years (10 pk yrs)    Smoking Tobacco Type  Cigarettes    Smokeless Tobacco Use  Never Used          Alcohol History     Alcohol Use Status  Yes Comment  occas g;ass of wine          Drug Use     Drug Use Status  No          Sexual Activity     Sexually Active  Not Currently Partners  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 Constitutional:       General: She is not in acute distress. Appearance: She is well-developed. She is not ill-appearing. HENT:      Head: Normocephalic and atraumatic. Eyes:      Pupils: Pupils are equal, round, and reactive to light. Cardiovascular:      Rate and Rhythm: Normal rate and regular rhythm. Pulmonary:      Effort: Pulmonary effort is normal.      Breath sounds: Normal breath sounds. Musculoskeletal:      Cervical back: Normal range of motion. Right lower leg: Edema present. Comments: Venous statis changes bilaterally, redness in right ankle, lower leg, foot and lower part of calf  Ankle and foot are tender on exam   Skin:     General: Skin is warm and dry. Neurological:      Mental Status: She is alert and oriented to person, place, and time. Mental status is at baseline. Psychiatric:         Mood and Affect: Mood normal.         Thought Content: Thought content normal.          Ruth Ann Nelson was seen today for animal bite.     Diagnoses and all orders for this visit:    Cellulitis of right foot  Comments:  finished augmentin  add doxycycline and see how he does    Other orders  - doxycycline hyclate (VIBRA-TABS) 100 MG tablet; Take 1 tablet by mouth 2 times daily for 10 days        I independently reviewed and updated the chief complaint, HPI, past medical and surgical history, medications, allergies and ROS as entered by the LPN. Seen by:   Camryn Salter DO

## 2022-07-14 ENCOUNTER — TELEPHONE (OUTPATIENT)
Dept: FAMILY MEDICINE CLINIC | Age: 72
End: 2022-07-14

## 2022-07-14 NOTE — PROGRESS NOTES
[facility]        [address]  [place] [facility]  Dept: [phone]        Follow up Note      Francisco Carrillo     Date of Visit:  07/15/22     CC:  Patient presents for follow up   Chief Complaint   Patient presents with    Headache     Started         HPI:    Pain is worse. Change in quality of symptoms:no. Medication side effects:not applicable . Recent diagnostic testing:none. Recent interventional procedures:none. She has been on anticoagulation medications to include ASA and has not been on herbal supplements. She is not diabetic. Imagin/2022 xray cervical -  FINDINGS:   There is generalized osteopenia. Vertebral body height and alignment are   normal.  There is moderate disc space narrowing at C5-6 and C6-7 with some   posterior spurs. There is mild bilateral foraminal narrowing at C5-6. Remainder of the cervical spine is unremarkable. Impression   Moderate degenerative change at C5-6 and C6-7      2022 CT head -      FINDINGS:   BRAIN/VENTRICLES: There is no acute intracranial hemorrhage, mass effect or   midline shift. No abnormal extra-axial fluid collection. The gray-white   differentiation is maintained without evidence of an acute infarct. There is   no evidence of hydrocephalus. Periventricular and subcortical white matter   hypoattenuation, consistent microangiopathic change. There is   encephalomalacia in the left frontal and parietal lobes. Atherosclerosis of   the intracranial vasculature. Prior left supraclinoid ICA aneurysm coiling. No abnormal enhancement. ORBITS: The visualized portion of the orbits demonstrate no acute abnormality. SINUSES: Partial opacification of the left sphenoid sinus with layering fluid   level. Visualized mastoid air cells are clear. SOFT TISSUES/SKULL:  No acute abnormality of the visualized skull or soft   tissues. Impression   1.  No acute intracranial abnormality. No abnormal enhancement. 2. Left frontal and parietal encephalomalacia. 3. Prior left supraclinoid ICA aneurysm coiling. Potential Aberrant Drug-Related Behavior:      Urine Drug Screening:    OARRS report:    Past Medical History:   Diagnosis Date    Aneurysm ([address]     Aneurysm ([address]     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, with stent in left internal  carotid artery    Cerebral artery occlusion with cerebral infarction ([address]     GERD (gastroesophageal reflux disease)     Headache     Hyperlipidemia     Hypertension     Hypothyroidism     Occipital neuralgia 4/12/2022    Osteoporosis     Thyroid disease        Past Surgical History:   Procedure Laterality Date    CAROTID ENDARTERECTOMY      HEMORRHOID SURGERY      HYSTERECTOMY (CERVIX STATUS UNKNOWN)      Partial at age 30    JOINT REPLACEMENT Bilateral     knee    KNEE ARTHROPLASTY Bilateral 2010    left sided stroke afterward, residual rt side weakness    OVARY REMOVAL      at age 26       Prior to Admission medications    Medication Sig Start Date End Date Taking?  Authorizing Provider   doxycycline hyclate (VIBRA-TABS) 100 MG tablet Take 1 tablet by mouth 2 times daily for 10 days 7/11/22 7/21/22 Yes Lucy Jay, DO   levothyroxine (SYNTHROID) 75 MCG tablet Take 1 tablet by mouth daily 4/26/22  Yes Benjamen Shed, DO   pantoprazole (PROTONIX) 40 MG tablet Take 1 tablet by mouth daily 4/26/22  Yes Benjamen Shed, DO   rosuvastatin (CRESTOR) 20 MG tablet 2 times a week 4/26/22  Yes Lucy Jay, DO   verapamil (CALAN) 120 MG tablet Take 1 tablet by mouth 2 times daily 4/26/22  Yes Lucy Jay, DO   clobetasol (TEMOVATE) 0.05 % ointment Apply topically as needed  9/7/21  Yes Historical Provider, MD   fluocinonide (LIDEX) 0.05 % ointment Apply topically as needed  3/2/21  Yes Historical Provider, MD   Calcium Citrate (CITRACAL PO) Take by mouth   Yes Historical Provider, MD   aspirin 325 MG EC tablet Take 325 mg by mouth daily   Yes Historical Provider, MD       Allergies   Allergen Reactions    Erythromycin     Sulfa Antibiotics        Social History     Socioeconomic History    Marital status:      Spouse name: Not on file    Number of children: Not on file    Years of education: Not on file    Highest education level: Not on file   Occupational History    Not on file   Tobacco Use    Smoking status: Former     Packs/day: 1.00     Years: 10.00     Pack years: 10.00     Types: Cigarettes     Start date:      Quit date: 1981     Years since quittin.5    Smokeless tobacco: Never   Vaping Use    Vaping Use: Never used   Substance and Sexual Activity    Alcohol use: Yes     Comment: bryan marx;ass of wine    Drug use: No    Sexual activity: Not Currently     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   Physical Activity: Inactive    Days of Exercise per Week: 0 days    Minutes of Exercise per Session: 0 min   Stress: Not on file   Social Connections: Not on file   Intimate Partner Violence: Not on file   Housing Stability: Not on file       Family History   Problem Relation Age of Onset    Heart Failure Mother     Breast Cancer Maternal Grandmother     Breast Cancer Maternal Aunt        REVIEW OF SYSTEMS:     Steve oCrrales denies fever/chills, chest pain, shortness of breath, new bowel or bladder complaints. All other review of systems was negative.     PHYSICAL EXAMINATION:      BP (!) 148/76 (Site: Left Upper Arm, Position: Sitting, Cuff Size: Medium Adult)   Pulse 71   Temp 98.1 °F (36.7 °C) (Oral)   Resp 18   Ht 5' 4\" (1.626 m)   Wt 190 lb (86.2 kg)   SpO2 95%   BMI 32.61 kg/m²     General:       General appearance:pleasant and well-hydrated, in no distress and A & O x3  Build:Normal Weight  Function:Rises from a seated position easily. HEENT:     Head:normocephalic, atraumatic  Pupils:regular, round, equal  Sclera: icterus absent     Lungs:     Breathing:normal breathing pattern     Abdomen:     Shape:non-distended and normal  Tenderness:none  Guarding:none     Cervical spine:     Inspection:normal  Palpation:tenderness paravertebral muscles, tenderness trapezium, left, right negative. Range of motion:abnormal mildly in flexion, extension rotation bilateral and is not painful.      Musculoskeletal:     Trigger points in trapezius:absent bilaterally  Trigger points in rhomboids:absent bilaterally  Trigger points in Paraveteral:absent bilaterally  Trigger points in supraspinatus/infraspinatus:absent  Spurling's:negative right, negative left    Jessica's:negative right, negative left      Extremities:     Tremors:None bilaterally upper and lower  Range of motion:Generally normal shoulders  Intact:Yes  Varicose veins:absent   Pulses:present Lt radial  Cyanosis:none  Edema:none x all 4 extremities     Neurological:     CNII-XII grossly intact  Sensory:normal to light touch BUE     Motor:      Right Grip5/5              Left Grip5/5               Right Bicep5/5           Left Bicep5/5              Right Triceps5/5       Left Triceps5/5          Right Deltoid5/5     Left Deltoid5/5                     Reflexes:       Right Brachioradialis reflex2+  Left Brachioradialis reflex2+  Right Biceps reflex2+  Left Biceps reflex2+  Right Triceps reflex2+  Left Triceps reflex2+     Gait:normal     Dermatology:     Skin:no rashes or lesions noted     Impression:     Currently not having headache or cervical pain  Chronic intermittent headaches since early 2000's  Had GONB x2, first one helpful, second one not helpful  Exam by Dr. Gerber Osman showed some cervical paraspinal and occipital muscle tension    Pain is beginning to start again recently and this is why she is here in f/u     Plan:    Urine screen

## 2022-07-14 NOTE — TELEPHONE ENCOUNTER
Jonathan Hughes calling about the cat bite. She said you wanted an update. It is swollen,  but not as bad as it was. It is also not as red as it was. It has improved quite a bit. She still has plenty of the antibiotic and will finish it.

## 2022-07-15 ENCOUNTER — OFFICE VISIT (OUTPATIENT)
Dept: PAIN MANAGEMENT | Age: 72
End: 2022-07-15
Payer: MEDICARE

## 2022-07-15 VITALS
BODY MASS INDEX: 32.44 KG/M2 | OXYGEN SATURATION: 95 % | WEIGHT: 190 LBS | TEMPERATURE: 98.1 F | SYSTOLIC BLOOD PRESSURE: 148 MMHG | DIASTOLIC BLOOD PRESSURE: 76 MMHG | HEIGHT: 64 IN | RESPIRATION RATE: 18 BRPM | HEART RATE: 71 BPM

## 2022-07-15 DIAGNOSIS — M79.10 MYALGIA: ICD-10-CM

## 2022-07-15 DIAGNOSIS — G89.4 CHRONIC PAIN SYNDROME: Primary | ICD-10-CM

## 2022-07-15 DIAGNOSIS — M54.2 CERVICALGIA: ICD-10-CM

## 2022-07-15 DIAGNOSIS — M50.90 CERVICAL DISC DISORDER: ICD-10-CM

## 2022-07-15 PROCEDURE — 1123F ACP DISCUSS/DSCN MKR DOCD: CPT | Performed by: PAIN MEDICINE

## 2022-07-15 PROCEDURE — 99213 OFFICE O/P EST LOW 20 MIN: CPT | Performed by: PAIN MEDICINE

## 2022-07-15 PROCEDURE — 99213 OFFICE O/P EST LOW 20 MIN: CPT

## 2022-07-15 NOTE — PROGRESS NOTES
Do you currently have any of the following:    Fever: No  Headache:  No  Cough: No  Shortness of breath: No  Exposed to anyone with these symptoms: No         Joseph Rueda presents to the [facility] on 7/15/2022. Jonathan Hughes is complaining of pain headaches. The pain is intermittent. The pain is described as aching and sharp. Pain is rated on her best day at a 3, on her worst day at a 10, and on average at a 5 on the VAS scale. She took her last dose of  tylenol yesturday around 12-1 pm pt states she has taken in  the past  aleve, percocet, muscle relaxer's. .     Any procedures since your last visit: No    Pacemaker or defibrillator: No     She is  on NSAIDS and is  on anticoagulation medications to include Asprin 325 mg daily for cerebral aneurism      Medication Contract and Consent for Opioid Use Documents Filed        No documents found                    There were no vitals taken for this visit. No LMP recorded. Patient has had a hysterectomy.

## 2022-07-16 ENCOUNTER — HOSPITAL ENCOUNTER (EMERGENCY)
Age: 72
Discharge: LEFT BEFORE DISCHARGE | End: 2022-07-17

## 2022-07-16 VITALS
DIASTOLIC BLOOD PRESSURE: 84 MMHG | RESPIRATION RATE: 16 BRPM | WEIGHT: 190 LBS | HEIGHT: 64 IN | HEART RATE: 92 BPM | OXYGEN SATURATION: 100 % | SYSTOLIC BLOOD PRESSURE: 141 MMHG | TEMPERATURE: 97.9 F | BODY MASS INDEX: 32.44 KG/M2

## 2022-07-17 NOTE — ED NOTES
Patient stated that she was feeling better and wanted to sign out.  AMA paperwork signed     Tasia Crowder LPN  [id number]

## 2022-07-19 ENCOUNTER — PROCEDURE VISIT (OUTPATIENT)
Dept: PAIN MANAGEMENT | Age: 72
End: 2022-07-19
Payer: MEDICARE

## 2022-07-19 VITALS
TEMPERATURE: 97.8 F | RESPIRATION RATE: 16 BRPM | HEART RATE: 74 BPM | DIASTOLIC BLOOD PRESSURE: 77 MMHG | OXYGEN SATURATION: 98 % | HEIGHT: 64 IN | SYSTOLIC BLOOD PRESSURE: 135 MMHG | BODY MASS INDEX: 32.44 KG/M2 | WEIGHT: 190 LBS

## 2022-07-19 DIAGNOSIS — G89.4 CHRONIC PAIN SYNDROME: Primary | ICD-10-CM

## 2022-07-19 DIAGNOSIS — M79.10 MYALGIA: ICD-10-CM

## 2022-07-19 PROCEDURE — 20553 NJX 1/MLT TRIGGER POINTS 3/>: CPT | Performed by: PAIN MEDICINE

## 2022-07-19 PROCEDURE — 99213 OFFICE O/P EST LOW 20 MIN: CPT | Performed by: PAIN MEDICINE

## 2022-07-19 PROCEDURE — 76942 ECHO GUIDE FOR BIOPSY: CPT | Performed by: PAIN MEDICINE

## 2022-07-19 RX ORDER — SODIUM CHLORIDE 9 MG/ML
5 INJECTION INTRAVENOUS ONCE
Status: COMPLETED | OUTPATIENT
Start: 2022-07-19 | End: 2022-07-19

## 2022-07-19 RX ORDER — BUPIVACAINE HYDROCHLORIDE 5 MG/ML
5 INJECTION, SOLUTION EPIDURAL; INTRACAUDAL ONCE
Status: COMPLETED | OUTPATIENT
Start: 2022-07-19 | End: 2022-07-19

## 2022-07-19 RX ADMIN — BUPIVACAINE HYDROCHLORIDE 5 ML: 5 INJECTION, SOLUTION EPIDURAL; INTRACAUDAL at 16:03

## 2022-07-19 RX ADMIN — SODIUM CHLORIDE 5 ML: 9 INJECTION INTRAVENOUS at 16:05

## 2022-07-19 NOTE — PROGRESS NOTES
2022    Patient: Joseph Rueda  :  1950  Age:  66 y.o. Sex:  female     PRE-PROCEDURE DIAGNOSIS: Myofascial pain. POST-PROCEDURE DIAGNOSIS: Same. PROCEDURE: left cervical paraspinal, trapezius, supraspinatus, rhomboid trigger point injections under ultrasound guidance. SURGEON:   EMILY Cordero. ANESTHESIA: local    ESTIMATED BLOOD LOSS:  None.  ______________________________________________________________________    BRIEF HISTORY: Joseph Rueda comes in today for left cervical paraspinal, trapezius, supraspinatus, rhomboid trigger point injection. After discussing the potential risks and benefits of the procedure with the patient. Jonathan Hughes did request that we proceed. A complete History & Physical was reviewed and it is unchanged. DESCRIPTION OF PROCEDURE:   Each trigger point was marked with patient input, prepped with chloraprep and draped. A #25-gauge, 1.5-inch needle was passed into the area of greatest tenderness under ultrasound guidance. Each trigger point received equal, divided dosages or a total of 10 cc of 0.25% Marcaine after negative aspiration of blood, air or paresthesia with ultrasound visualization of solution spread. The needle was removed intact and Band-Aids were applied. Disposition the patient tolerated the procedure well and there were no complications . Jonathan Hughes will follow up in our [address] [place] [address] as scheduled. She was encouraged to call with questions, concerns or if worsening of symptoms occurs.     Bo Veloz DO

## 2022-07-19 NOTE — PROGRESS NOTES
Do you currently have any of the following:    Fever: No  Headache:  No  Cough: No  Shortness of breath: No  Exposed to anyone with these symptoms: No         Luke Batista presents to the [facility] on [facility]/19/2022. Matthew Herndon is complaining of pain in her neck. The pain is constant. The pain is described as aching, dull, sharp, and miserable. Pain is rated on her best day at a 2, on her worst day at a 8, and on average at a 6 on the VAS scale. Any procedures since your last visit: No    Pacemaker or defibrillator: No     She is not on NSAIDS and is  on anticoagulation medications to include ASA and is managed by Diogo Braxton DO  .     Medication Contract and Consent for Opioid Use Documents Filed        No documents found                    /77   Pulse 74   Temp 97.8 °F (36.6 °C) (Infrared)   Resp 16   Ht 5' 4\" (1.626 m)   Wt 190 lb (86.2 kg)   SpO2 98%   BMI 32.61 kg/m²      No LMP recorded. Patient has had a hysterectomy.

## 2022-07-25 ENCOUNTER — TELEPHONE (OUTPATIENT)
Dept: PAIN MANAGEMENT | Age: 72
End: 2022-07-25

## 2022-07-25 DIAGNOSIS — M79.10 MYALGIA: ICD-10-CM

## 2022-07-25 DIAGNOSIS — G89.4 CHRONIC PAIN SYNDROME: Primary | ICD-10-CM

## 2022-07-25 DIAGNOSIS — M54.2 CERVICALGIA: ICD-10-CM

## 2022-07-27 RX ORDER — LIDOCAINE 50 MG/G
1 PATCH TOPICAL DAILY
Qty: 30 PATCH | Refills: 5 | Status: SHIPPED | OUTPATIENT
Start: 2022-07-27 | End: 2022-08-26

## 2022-07-27 NOTE — TELEPHONE ENCOUNTER
Escribed lidoderm patches  Please let her know she can cut them and use 1/2 on each side  Pat Faster, DO

## 2022-07-28 ENCOUNTER — TELEPHONE (OUTPATIENT)
Dept: PAIN MANAGEMENT | Age: 72
End: 2022-07-28

## 2022-08-08 ENCOUNTER — OFFICE VISIT (OUTPATIENT)
Dept: FAMILY MEDICINE CLINIC | Age: 72
End: 2022-08-08
Payer: MEDICARE

## 2022-08-08 VITALS
OXYGEN SATURATION: 99 % | BODY MASS INDEX: 33.19 KG/M2 | SYSTOLIC BLOOD PRESSURE: 114 MMHG | HEART RATE: 72 BPM | TEMPERATURE: 98.3 F | DIASTOLIC BLOOD PRESSURE: 64 MMHG | WEIGHT: 194.4 LBS | HEIGHT: 64 IN

## 2022-08-08 DIAGNOSIS — I87.2 VENOUS STASIS DERMATITIS OF RIGHT LOWER EXTREMITY: Primary | ICD-10-CM

## 2022-08-08 DIAGNOSIS — L03.115 CELLULITIS OF RIGHT LOWER EXTREMITY: ICD-10-CM

## 2022-08-08 DIAGNOSIS — I10 ESSENTIAL HYPERTENSION: ICD-10-CM

## 2022-08-08 PROCEDURE — 1123F ACP DISCUSS/DSCN MKR DOCD: CPT | Performed by: FAMILY MEDICINE

## 2022-08-08 PROCEDURE — 99213 OFFICE O/P EST LOW 20 MIN: CPT | Performed by: FAMILY MEDICINE

## 2022-08-08 ASSESSMENT — ENCOUNTER SYMPTOMS
COUGH: 0
SINUS PAIN: 0
CONSTIPATION: 0
SORE THROAT: 0
VOMITING: 0
BACK PAIN: 0
NAUSEA: 0
WHEEZING: 0
DIARRHEA: 0
EYE PAIN: 0
SHORTNESS OF BREATH: 0
ABDOMINAL PAIN: 0
COLOR CHANGE: 1
TROUBLE SWALLOWING: 0
CHEST TIGHTNESS: 0

## 2022-08-08 NOTE — PROGRESS NOTES
22    Name: Tom Zhou  :1950   Sex:female   Age:71 y.o. Chief Complaint   Patient presents with    Cellulitis     Patient presents to office for visit. She continues to have redness to right lower leg after cat bite and completing antibiotics. Patient says prior to today, the right lower leg was still very swollen as well. Patient has numbness to her right foot. Last week she says it felt as if her mouth was on fire and was very red, so she went to [facility]. She was given dexamethasone liquid and says her symptoms improved after four days. Here for a recheck on right lower leg  The infections has been adequately treated with 2 rounds of antibitoics and after the 2nd abx she had a few ulcers in her mouth and needed seen  But that has resovlved    Still some swellign in right lower leg and redness  No pain, it is gone now  Redness off and on  More when she is on it a while  Not wearing compression knee highs      Review of Systems   Constitutional:  Negative for appetite change, fatigue and fever. HENT:  Negative for congestion, ear pain, sinus pain, sore throat and trouble swallowing. Eyes:  Negative for pain. Respiratory:  Negative for cough, chest tightness, shortness of breath and wheezing. Cardiovascular:  Positive for leg swelling. Negative for chest pain and palpitations. Gastrointestinal:  Negative for abdominal pain, constipation, diarrhea, nausea and vomiting. Endocrine: Negative for cold intolerance and heat intolerance. Genitourinary:  Negative for difficulty urinating, dysuria, frequency, hematuria, pelvic pain and urgency. Musculoskeletal:  Negative for arthralgias, back pain, gait problem, joint swelling and myalgias. Skin:  Positive for color change. Negative for rash and wound. Neurological:  Positive for numbness. Negative for dizziness, syncope, light-headedness and headaches. Hematological:  Negative for adenopathy. Psychiatric/Behavioral:  Negative for confusion, dysphoric mood, self-injury, sleep disturbance and suicidal ideas. The patient is not nervous/anxious. Current Outpatient Medications:     lidocaine (LIDODERM) 5 %, Place 1 patch onto the skin in the morning. 12 hours on, 12 hours off. ., Disp: 30 patch, Rfl: 5    levothyroxine (SYNTHROID) 75 MCG tablet, Take 1 tablet by mouth daily, Disp: 90 tablet, Rfl: 1    pantoprazole (PROTONIX) 40 MG tablet, Take 1 tablet by mouth daily, Disp: 90 tablet, Rfl: 1    rosuvastatin (CRESTOR) 20 MG tablet, 2 times a week, Disp: 18 tablet, Rfl: 1    verapamil (CALAN) 120 MG tablet, Take 1 tablet by mouth 2 times daily, Disp: 180 tablet, Rfl: 1    clobetasol (TEMOVATE) 0.05 % ointment, Apply topically as needed , Disp: , Rfl:     fluocinonide (LIDEX) 0.05 % ointment, Apply topically as needed , Disp: , Rfl:     aspirin 325 MG EC tablet, Take 325 mg by mouth daily, Disp: , Rfl:   Allergies   Allergen Reactions    Erythromycin     Sulfa Antibiotics       Past Medical History:   Diagnosis Date    Aneurysm ([address]     Aneurysm ([address]     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, with stent in left internal  carotid artery    Cerebral artery occlusion with cerebral infarction ([address]     GERD (gastroesophageal reflux disease)     Headache     Hyperlipidemia     Hypertension     Hypothyroidism     Occipital neuralgia 4/12/2022    Osteoporosis     Thyroid disease      Patient Active Problem List    Diagnosis Date Noted    Chronic pain syndrome 05/18/2022    Cervical disc disorder 05/18/2022    Cervicalgia 05/18/2022    Myalgia 05/18/2022    Osteopenia after menopause 04/26/2022    Occipital neuralgia 04/12/2022    Poison ivy dermatitis 07/06/2021    Vitamin D deficiency 03/19/2020    Hyperlipidemia 09/25/2019    Recurrent occipital headache 08/06/2019    Hypothyroidism 08/06/2019    GERD (gastroesophageal reflux disease) 08/06/2019    Peroneal tendinitis of right lower extremity 05/30/2019    Pain in right foot 05/14/2019    Essential hypertension 11/14/2016    Cerebral aneurysm, nonruptured 05/21/2009      Past Surgical History:   Procedure Laterality Date    CAROTID ENDARTERECTOMY      HEMORRHOID SURGERY      HYSTERECTOMY (CERVIX STATUS UNKNOWN)      Partial at age 30    JOINT REPLACEMENT Bilateral     knee    KNEE ARTHROPLASTY Bilateral 2010    left sided stroke afterward, residual rt side weakness    OVARY REMOVAL      at age 26      Social History       Tobacco History       Smoking Status  Former Smoking Start Date  1/1/1971 Quit Date  1/1/1981 Smoking Frequency  1 pack/day for 10.00 years (10.00 pk-yrs)    Smoking Tobacco Type  Cigarettes from 1/1/1971 to 1/1/1981      Smokeless Tobacco Use  Never              Alcohol History       Alcohol Use Status  Yes Drinks/Week  1 Glasses of wine, 0 Cans of beer, 0 Shots of liquor, 0 Drinks containing 0.5 oz of alcohol per week Amount  1.0 standard drink of alcohol/wk Comment  occas g;ass of wine              Drug Use       Drug Use Status  No              Sexual Activity       Sexually Active  Not Currently Partners  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 Constitutional:       General: She is not in acute distress. Appearance: She is well-developed. She is not ill-appearing. HENT:      Head: Normocephalic and atraumatic. Eyes:      Pupils: Pupils are equal, round, and reactive to light. Cardiovascular:      Rate and Rhythm: Normal rate and regular rhythm. Pulmonary:      Effort: Pulmonary effort is normal.      Breath sounds: Normal breath sounds. Musculoskeletal:      Cervical back: Normal range of motion. Comments: Gait steady, mild swelling in right lower leg and foot, venous stasis changes are present   Skin:     General: Skin is warm and dry. Neurological:      Mental Status: She is alert and oriented to person, place, and time. Aaron Vieira was seen today for cellulitis. Diagnoses and all orders for this visit:    Venous stasis dermatitis of right lower extremity  Comments:  more movement  conmpression knee highs  continue to watch diet  no s/s of infection    Cellulitis of right lower extremity  Comments:  infection resolved    Essential hypertension  Comments:  has eens table  readings good, no issues      I independently reviewed and updated the chief complaint, HPI, past medical and surgical history, medications, allergies and ROS as entered by the LPN. Seen by:   Suzan Howard DO

## 2022-08-17 ENCOUNTER — OFFICE VISIT (OUTPATIENT)
Dept: OBGYN | Age: 72
End: 2022-08-17
Payer: MEDICARE

## 2022-08-17 ENCOUNTER — OFFICE VISIT (OUTPATIENT)
Dept: PAIN MANAGEMENT | Age: 72
End: 2022-08-17
Payer: MEDICARE

## 2022-08-17 VITALS
BODY MASS INDEX: 33.47 KG/M2 | HEART RATE: 72 BPM | DIASTOLIC BLOOD PRESSURE: 82 MMHG | WEIGHT: 195 LBS | SYSTOLIC BLOOD PRESSURE: 165 MMHG

## 2022-08-17 VITALS
SYSTOLIC BLOOD PRESSURE: 126 MMHG | OXYGEN SATURATION: 97 % | HEART RATE: 78 BPM | WEIGHT: 195 LBS | BODY MASS INDEX: 33.29 KG/M2 | TEMPERATURE: 97 F | DIASTOLIC BLOOD PRESSURE: 62 MMHG | RESPIRATION RATE: 16 BRPM | HEIGHT: 64 IN

## 2022-08-17 DIAGNOSIS — Z01.419 ENCOUNTER FOR WELL WOMAN EXAM: Primary | ICD-10-CM

## 2022-08-17 DIAGNOSIS — M54.2 CERVICALGIA: ICD-10-CM

## 2022-08-17 DIAGNOSIS — M79.10 MYALGIA: ICD-10-CM

## 2022-08-17 DIAGNOSIS — L90.0 LICHEN SCLEROSUS: ICD-10-CM

## 2022-08-17 DIAGNOSIS — M50.90 CERVICAL DISC DISORDER: ICD-10-CM

## 2022-08-17 DIAGNOSIS — G89.4 CHRONIC PAIN SYNDROME: Primary | ICD-10-CM

## 2022-08-17 PROCEDURE — 99213 OFFICE O/P EST LOW 20 MIN: CPT | Performed by: PAIN MEDICINE

## 2022-08-17 PROCEDURE — 1123F ACP DISCUSS/DSCN MKR DOCD: CPT | Performed by: PAIN MEDICINE

## 2022-08-17 PROCEDURE — 99387 INIT PM E/M NEW PAT 65+ YRS: CPT | Performed by: OBSTETRICS & GYNECOLOGY

## 2022-08-17 PROCEDURE — 99203 OFFICE O/P NEW LOW 30 MIN: CPT | Performed by: OBSTETRICS & GYNECOLOGY

## 2022-08-17 RX ORDER — CLOBETASOL PROPIONATE 0.5 MG/G
OINTMENT TOPICAL DAILY
Qty: 30 G | Refills: 1 | Status: SHIPPED | OUTPATIENT
Start: 2022-08-17

## 2022-08-17 RX ORDER — PREGABALIN 25 MG/1
CAPSULE ORAL
Qty: 69 CAPSULE | Refills: 0 | Status: SHIPPED | OUTPATIENT
Start: 2022-08-17 | End: 2022-09-16

## 2022-08-17 NOTE — PROGRESS NOTES
[facility]        [address]  FIDELINA ROBERTSON [facility] - [facility], [address] [place] [address]  Dept: [phone]        Follow up Note      Messi Miramontes     Date of Visit:  22     CC:  Patient presents for follow up   Chief Complaint   Patient presents with    Follow-up     headache       HPI:    Pain is unchanged. Change in quality of symptoms:no. Medication side effects:not applicable . Recent diagnostic testing:none. Recent interventional procedures:Left cervical paraspinal, trapezius, supraspinatus and rhomboid TPI under US guidance with 100% relief for two hours. She has been on anticoagulation medications to include ASA and has not been on herbal supplements. She is not diabetic. Imagin/2022 xray cervical -  FINDINGS:   There is generalized osteopenia. Vertebral body height and alignment are   normal.  There is moderate disc space narrowing at C5-6 and C6-7 with some   posterior spurs. There is mild bilateral foraminal narrowing at C5-6. Remainder of the cervical spine is unremarkable. Impression   Moderate degenerative change at C5-6 and C6-7      2022 CT head -      FINDINGS:   BRAIN/VENTRICLES: There is no acute intracranial hemorrhage, mass effect or   midline shift. No abnormal extra-axial fluid collection. The gray-white   differentiation is maintained without evidence of an acute infarct. There is   no evidence of hydrocephalus. Periventricular and subcortical white matter   hypoattenuation, consistent microangiopathic change. There is   encephalomalacia in the left frontal and parietal lobes. Atherosclerosis of   the intracranial vasculature. Prior left supraclinoid ICA aneurysm coiling. No abnormal enhancement. ORBITS: The visualized portion of the orbits demonstrate no acute abnormality. SINUSES: Partial opacification of the left sphenoid sinus with layering fluid   level. Visualized mastoid air cells are clear.        SOFT TISSUES/SKULL:  No acute abnormality of the visualized skull or soft   tissues. Impression   1. No acute intracranial abnormality. No abnormal enhancement. 2. Left frontal and parietal encephalomalacia. 3. Prior left supraclinoid ICA aneurysm coiling. Potential Aberrant Drug-Related Behavior:      Urine Drug Screening:    OARRS report:    Past Medical History:   Diagnosis Date    Aneurysm ([address]     Aneurysm ([address]     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, with stent in left internal  carotid artery    Cerebral artery occlusion with cerebral infarction ([address]     GERD (gastroesophageal reflux disease)     Headache     Hyperlipidemia     Hypertension     Hypothyroidism     Occipital neuralgia 4/12/2022    Osteoporosis     Thyroid disease        Past Surgical History:   Procedure Laterality Date    CAROTID ENDARTERECTOMY      HEMORRHOID SURGERY      HYSTERECTOMY (CERVIX STATUS UNKNOWN)      Partial at age 30    JOINT REPLACEMENT Bilateral     knee    KNEE ARTHROPLASTY Bilateral 2010    left sided stroke afterward, residual rt side weakness    OVARY REMOVAL      at age 26       Prior to Admission medications    Medication Sig Start Date End Date Taking? Authorizing Provider   clobetasol (TEMOVATE) 0.05 % ointment Apply topically daily Apply topically 2 times daily. 8/17/22  Yes Lisa Morin MD   lidocaine (LIDODERM) 5 % Place 1 patch onto the skin in the morning. 12 hours on, 12 hours off. . 7/27/22 8/26/22 Yes Irving Orellana, DO   levothyroxine (SYNTHROID) 75 MCG tablet Take 1 tablet by mouth daily 4/26/22  Yes Ray Bernabe, DO   pantoprazole (PROTONIX) 40 MG tablet Take 1 tablet by mouth daily 4/26/22  Yes Ray Bernabe, DO   rosuvastatin (CRESTOR) 20 MG tablet 2 times a week 4/26/22  Yes Lucy Garcia, DO   verapamil (CALAN) 120 MG tablet Take 1 tablet by mouth 2 times daily 4/26/22  Yes Ray Bernabe,    clobetasol (TEMOVATE) 0.05 % ointment Apply topically as needed  9/7/21  Yes Historical Provider, MD   fluocinonide (LIDEX) 0.05 % ointment Apply topically as needed  3/2/21  Yes Historical Provider, MD   aspirin 325 MG EC tablet Take 325 mg by mouth daily   Yes Historical Provider, MD       Allergies   Allergen Reactions    Erythromycin     Sulfa Antibiotics        Social History     Socioeconomic History    Marital status:      Spouse name: Not on file    Number of children: Not on file    Years of education: Not on file    Highest education level: Not on file   Occupational History    Not on file   Tobacco Use    Smoking status: Former     Packs/day: 1.00     Years: 10.00     Pack years: 10.00     Types: Cigarettes     Start date: 1971     Quit date: 1981     Years since quittin.6    Smokeless tobacco: Never   Vaping Use    Vaping Use: Never used   Substance and Sexual Activity    Alcohol use: Yes     Alcohol/week: 1.0 standard drink     Types: 1 Glasses of wine per week     Comment: occas g;ass of wine    Drug use: No    Sexual activity: Not Currently     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   Physical Activity: Inactive    Days of Exercise per Week: 0 days    Minutes of Exercise per Session: 0 min   Stress: Not on file   Social Connections: Not on file   Intimate Partner Violence: Not on file   Housing Stability: Not on file       Family History   Problem Relation Age of Onset    Heart Failure Mother     Breast Cancer Maternal Grandmother     Breast Cancer Maternal Aunt        REVIEW OF SYSTEMS:     Noah Zayas denies fever/chills, chest pain, shortness of breath, new bowel or bladder complaints. All other review of systems was negative.     PHYSICAL EXAMINATION:      /62   Pulse 78   Temp 97 °F (36.1 °C) (Infrared)   Resp 16   Ht 5' 4\" by Dr. Anaya Don showed some cervical paraspinal and occipital muscle tension    Pain was gone for two hours after the TPI thus will plan to repeat with steroids in hopes of longevity     Plan:    Urine screen deferred  OARRS report reviewed  Pregabalin low dose titration - discussed common SE's, she will consider trying this thus it was escribed  Consider GONB prn  Left cervical paraspinal, trapezius, supraspinatus and rhomboid TPI under US guidance with 100% relief for two hours, although she does have less frequent headaches - will repeat with steroids  Has done PT multiple times, inconsistently helpful  Continues with twice monthly therapeutic massage  Patient encouraged to stay active   Treatment plan discussed with the patient including medication and procedure side effects     Cc:  Referring physician    EMILY Mendoza.

## 2022-08-17 NOTE — PROGRESS NOTES
Do you currently have any of the following:    Fever: No  Headache:  No  Cough: No  Shortness of breath: No  Exposed to anyone with these symptoms: No         Rosas Briseno presents to the [facility] on 8/17/2022. Janelle Benson is complaining of pain headache. The pain is constant. The pain is described as nagging. Pain is rated on her best day at a 1, on her worst day at a 6, and on average at a 3 on the VAS scale. She took her last dose of Tylenol PRN. Any procedures since your last visit: No.    Pacemaker or defibrillator: No .    She is not on NSAIDS and is  on anticoagulation medications to include ASA and is managed by Laney Bronson DO  .     Medication Contract and Consent for Opioid Use Documents Filed        No documents found                    /62   Pulse 78   Temp 97 °F (36.1 °C) (Infrared)   Resp 16   Ht 5' 4\" (1.626 m)   Wt 195 lb (88.5 kg)   SpO2 97%   BMI 33.47 kg/m²      No LMP recorded. Patient has had a hysterectomy.

## 2022-08-17 NOTE — PROGRESS NOTES
[facility]     Patient presents for annual exam.     Patient had a hysterectomy due to endometriosis many years ago. Denies abnormal pap smears. Patient had a mammogram 7/2022. Past Medical History:   Diagnosis Date    Aneurysm ([address]     Aneurysm ([address]     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, with stent in left internal  carotid artery    Cerebral artery occlusion with cerebral infarction ([address]     GERD (gastroesophageal reflux disease)     Headache     Hyperlipidemia     Hypertension     Hypothyroidism     Occipital neuralgia 4/12/2022    Osteoporosis     Thyroid disease         Past Surgical History:   Procedure Laterality Date    CAROTID ENDARTERECTOMY      HEMORRHOID SURGERY      HYSTERECTOMY (CERVIX STATUS UNKNOWN)      Partial at age 30    JOINT REPLACEMENT Bilateral     knee    KNEE ARTHROPLASTY Bilateral 2010    left sided stroke afterward, residual rt side weakness    OVARY REMOVAL      at age 26        Family History   Problem Relation Age of Onset    Heart Failure Mother     Breast Cancer Maternal Grandmother     Breast Cancer Maternal Aunt           Current Outpatient Medications:     clobetasol (TEMOVATE) 0.05 % ointment, Apply topically daily Apply topically 2 times daily. , Disp: 30 g, Rfl: 1    lidocaine (LIDODERM) 5 %, Place 1 patch onto the skin in the morning. 12 hours on, 12 hours off. ., Disp: 30 patch, Rfl: 5    levothyroxine (SYNTHROID) 75 MCG tablet, Take 1 tablet by mouth daily, Disp: 90 tablet, Rfl: 1    pantoprazole (PROTONIX) 40 MG tablet, Take 1 tablet by mouth daily, Disp: 90 tablet, Rfl: 1    rosuvastatin (CRESTOR) 20 MG tablet, 2 times a week, Disp: 18 tablet, Rfl: 1    verapamil (CALAN) 120 MG tablet, Take 1 tablet by mouth 2 times daily, Disp: 180 tablet, Rfl: 1    clobetasol (TEMOVATE) 0.05 % ointment, Apply topically as needed , Disp: , Rfl:     fluocinonide (LIDEX) 0.05 % ointment, Apply topically as needed , Disp: , Rfl:     aspirin 325 MG EC tablet, Take 325 mg by mouth daily, Disp: , Rfl:      Allergies   Allergen Reactions    Erythromycin     Sulfa Antibiotics         Social History       Tobacco History       Smoking Status  Former Smoking Start Date  1/1/1971 Quit Date  1/1/1981 Smoking Frequency  1 pack/day for 10.00 years (10.00 pk-yrs)    Smoking Tobacco Type  Cigarettes from 1/1/1971 to 1/1/1981      Smokeless Tobacco Use  Never              Alcohol History       Alcohol Use Status  Yes Drinks/Week  1 Glasses of wine, 0 Cans of beer, 0 Shots of liquor, 0 Drinks containing 0.5 oz of alcohol per week Amount  1.0 standard drink of alcohol/wk Comment  occas g;ass of wine              Drug Use       Drug Use Status  No              Sexual Activity       Sexually Active  Not Currently Partners  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 Pelvic exam: Thinning labia with sclerotic labia minora and vaginal opening, consistent with lichen sclerosus  No pelvic or adnexal masses or tenderness. Apple Pierce was seen today for new patient. Diagnoses and all orders for this visit:    Encounter for well woman exam    Lichen sclerosus  -     clobetasol (TEMOVATE) 0.05 % ointment; Apply topically daily Apply topically 2 times daily. Discussed lichen sclerosus with patient, states she was given a cream in the past for this. Discussed clobetasol use. Will send prescription to the pharmacy. Return in about 1 year (around 8/17/2023) for Annual, or as needed.      Juan Cano MD

## 2022-09-01 ENCOUNTER — TELEPHONE (OUTPATIENT)
Dept: FAMILY MEDICINE CLINIC | Age: 72
End: 2022-09-01

## 2022-09-01 DIAGNOSIS — G62.9 PERIPHERAL POLYNEUROPATHY: ICD-10-CM

## 2022-09-01 DIAGNOSIS — I87.2 VENOUS STASIS DERMATITIS OF RIGHT LOWER EXTREMITY: Primary | ICD-10-CM

## 2022-09-01 NOTE — TELEPHONE ENCOUNTER
Patient says she was advised a new referral is needed to see Dr. Nereyda Granado since it is for a different issue. Can you please place referral? Patient is aware  Is out of the office until next week.

## 2022-09-09 DIAGNOSIS — G62.9 PERIPHERAL POLYNEUROPATHY: ICD-10-CM

## 2022-09-09 DIAGNOSIS — I87.2 VENOUS STASIS DERMATITIS OF RIGHT LOWER EXTREMITY: Primary | ICD-10-CM

## 2022-09-09 DIAGNOSIS — L03.115 CELLULITIS OF RIGHT LOWER EXTREMITY: ICD-10-CM

## 2022-09-12 DIAGNOSIS — M79.604 RIGHT LEG PAIN: Primary | ICD-10-CM

## 2022-09-12 DIAGNOSIS — M54.10 RADICULAR PAIN OF RIGHT LOWER EXTREMITY: ICD-10-CM

## 2022-10-17 DIAGNOSIS — E78.2 MIXED HYPERLIPIDEMIA: ICD-10-CM

## 2022-10-17 DIAGNOSIS — E03.9 ACQUIRED HYPOTHYROIDISM: ICD-10-CM

## 2022-10-17 DIAGNOSIS — I10 ESSENTIAL HYPERTENSION: ICD-10-CM

## 2022-10-17 LAB
ALBUMIN SERPL-MCNC: 4.1 G/DL (ref 3.5–5.2)
ALP BLD-CCNC: 86 U/L (ref 35–104)
ALT SERPL-CCNC: 15 U/L (ref 0–32)
ANION GAP SERPL CALCULATED.3IONS-SCNC: 11 MMOL/L (ref 7–16)
AST SERPL-CCNC: 21 U/L (ref 0–31)
BASOPHILS ABSOLUTE: 0.02 E9/L (ref 0–0.2)
BASOPHILS RELATIVE PERCENT: 0.5 % (ref 0–2)
BILIRUB SERPL-MCNC: 0.6 MG/DL (ref 0–1.2)
BUN BLDV-MCNC: 14 MG/DL (ref 6–23)
CALCIUM SERPL-MCNC: 9.4 MG/DL (ref 8.6–10.2)
CHLORIDE BLD-SCNC: 106 MMOL/L (ref 98–107)
CHOLESTEROL, TOTAL: 172 MG/DL (ref 0–199)
CO2: 27 MMOL/L (ref 22–29)
CREAT SERPL-MCNC: 0.7 MG/DL (ref 0.5–1)
EOSINOPHILS ABSOLUTE: 0.1 E9/L (ref 0.05–0.5)
EOSINOPHILS RELATIVE PERCENT: 2.6 % (ref 0–6)
GFR AFRICAN AMERICAN: >60
GFR NON-AFRICAN AMERICAN: >60 ML/MIN/1.73
GLUCOSE BLD-MCNC: 95 MG/DL (ref 74–99)
HCT VFR BLD CALC: 43.4 % (ref 34–48)
HDLC SERPL-MCNC: 75 MG/DL
HEMOGLOBIN: 14.1 G/DL (ref 11.5–15.5)
IMMATURE GRANULOCYTES #: 0.01 E9/L
IMMATURE GRANULOCYTES %: 0.3 % (ref 0–5)
LDL CHOLESTEROL CALCULATED: 87 MG/DL (ref 0–99)
LYMPHOCYTES ABSOLUTE: 1.54 E9/L (ref 1.5–4)
LYMPHOCYTES RELATIVE PERCENT: 39.3 % (ref 20–42)
MCH RBC QN AUTO: 30.5 PG (ref 26–35)
MCHC RBC AUTO-ENTMCNC: 32.5 % (ref 32–34.5)
MCV RBC AUTO: 93.9 FL (ref 80–99.9)
MICROALBUMIN UR-MCNC: <12 MG/L
MONOCYTES ABSOLUTE: 0.31 E9/L (ref 0.1–0.95)
MONOCYTES RELATIVE PERCENT: 7.9 % (ref 2–12)
NEUTROPHILS ABSOLUTE: 1.94 E9/L (ref 1.8–7.3)
NEUTROPHILS RELATIVE PERCENT: 49.4 % (ref 43–80)
PDW BLD-RTO: 13.6 FL (ref 11.5–15)
PLATELET # BLD: 188 E9/L (ref 130–450)
PMV BLD AUTO: 11 FL (ref 7–12)
POTASSIUM SERPL-SCNC: 4.3 MMOL/L (ref 3.5–5)
RBC # BLD: 4.62 E12/L (ref 3.5–5.5)
SODIUM BLD-SCNC: 144 MMOL/L (ref 132–146)
T4 FREE: 1.14 NG/DL (ref 0.93–1.7)
TOTAL PROTEIN: 6.2 G/DL (ref 6.4–8.3)
TRIGL SERPL-MCNC: 51 MG/DL (ref 0–149)
TSH SERPL DL<=0.05 MIU/L-ACNC: 3.54 UIU/ML (ref 0.27–4.2)
VLDLC SERPL CALC-MCNC: 10 MG/DL
WBC # BLD: 3.9 E9/L (ref 4.5–11.5)

## 2022-10-18 ENCOUNTER — OFFICE VISIT (OUTPATIENT)
Dept: FAMILY MEDICINE CLINIC | Age: 72
End: 2022-10-18
Payer: MEDICARE

## 2022-10-18 ENCOUNTER — OFFICE VISIT (OUTPATIENT)
Dept: NEUROLOGY | Age: 72
End: 2022-10-18
Payer: MEDICARE

## 2022-10-18 VITALS
HEART RATE: 74 BPM | HEIGHT: 64 IN | DIASTOLIC BLOOD PRESSURE: 78 MMHG | SYSTOLIC BLOOD PRESSURE: 118 MMHG | TEMPERATURE: 97.9 F | BODY MASS INDEX: 34.62 KG/M2 | OXYGEN SATURATION: 100 % | WEIGHT: 202.8 LBS

## 2022-10-18 VITALS
HEIGHT: 64 IN | WEIGHT: 195 LBS | DIASTOLIC BLOOD PRESSURE: 90 MMHG | BODY MASS INDEX: 33.29 KG/M2 | SYSTOLIC BLOOD PRESSURE: 150 MMHG

## 2022-10-18 DIAGNOSIS — M79.604 RIGHT LEG PAIN: ICD-10-CM

## 2022-10-18 DIAGNOSIS — M54.10 RADICULAR PAIN OF RIGHT LOWER EXTREMITY: ICD-10-CM

## 2022-10-18 DIAGNOSIS — E03.9 ACQUIRED HYPOTHYROIDISM: ICD-10-CM

## 2022-10-18 DIAGNOSIS — E78.2 MIXED HYPERLIPIDEMIA: ICD-10-CM

## 2022-10-18 DIAGNOSIS — Z23 NEED FOR IMMUNIZATION AGAINST INFLUENZA: ICD-10-CM

## 2022-10-18 DIAGNOSIS — I10 ESSENTIAL HYPERTENSION: Primary | ICD-10-CM

## 2022-10-18 DIAGNOSIS — K21.9 GASTROESOPHAGEAL REFLUX DISEASE WITHOUT ESOPHAGITIS: ICD-10-CM

## 2022-10-18 DIAGNOSIS — M54.17 RIGHT LUMBOSACRAL RADICULOPATHY: Primary | ICD-10-CM

## 2022-10-18 DIAGNOSIS — M54.16 LUMBAR RADICULOPATHY: ICD-10-CM

## 2022-10-18 PROCEDURE — 95886 MUSC TEST DONE W/N TEST COMP: CPT | Performed by: PSYCHIATRY & NEUROLOGY

## 2022-10-18 PROCEDURE — 95910 NRV CNDJ TEST 7-8 STUDIES: CPT | Performed by: PSYCHIATRY & NEUROLOGY

## 2022-10-18 PROCEDURE — 1123F ACP DISCUSS/DSCN MKR DOCD: CPT | Performed by: FAMILY MEDICINE

## 2022-10-18 PROCEDURE — G0008 ADMIN INFLUENZA VIRUS VAC: HCPCS | Performed by: FAMILY MEDICINE

## 2022-10-18 PROCEDURE — 99214 OFFICE O/P EST MOD 30 MIN: CPT | Performed by: FAMILY MEDICINE

## 2022-10-18 PROCEDURE — 90694 VACC AIIV4 NO PRSRV 0.5ML IM: CPT | Performed by: FAMILY MEDICINE

## 2022-10-18 RX ORDER — VERAPAMIL HYDROCHLORIDE 120 MG/1
120 TABLET, FILM COATED ORAL 2 TIMES DAILY
Qty: 180 TABLET | Refills: 2 | Status: SHIPPED | OUTPATIENT
Start: 2022-10-18

## 2022-10-18 RX ORDER — LEVOTHYROXINE SODIUM 0.07 MG/1
75 TABLET ORAL DAILY
Qty: 90 TABLET | Refills: 2 | Status: SHIPPED | OUTPATIENT
Start: 2022-10-18

## 2022-10-18 RX ORDER — PANTOPRAZOLE SODIUM 40 MG/1
40 TABLET, DELAYED RELEASE ORAL DAILY
Qty: 90 TABLET | Refills: 2 | Status: SHIPPED | OUTPATIENT
Start: 2022-10-18

## 2022-10-18 RX ORDER — ROSUVASTATIN CALCIUM 20 MG/1
20 TABLET, COATED ORAL DAILY
Qty: 90 TABLET | Refills: 1 | Status: SHIPPED | OUTPATIENT
Start: 2022-10-18

## 2022-10-18 ASSESSMENT — ENCOUNTER SYMPTOMS
SINUS PAIN: 0
EYE PAIN: 0
BACK PAIN: 0
TROUBLE SWALLOWING: 0
ABDOMINAL PAIN: 0
VOMITING: 0
SHORTNESS OF BREATH: 0
NAUSEA: 0
WHEEZING: 0
COUGH: 0
CHEST TIGHTNESS: 0
DIARRHEA: 0
CONSTIPATION: 0
SORE THROAT: 0

## 2022-10-18 NOTE — PROGRESS NOTES
10/18/22    Name: Melany Ardon  RUW:[id number]   Sex:female   Age:72 y.o. Chief Complaint   Patient presents with    Hypothyroidism    Hypertension    Hyperlipidemia     Patient presents to office for visit. She did have labs done. Patient had EMG done today. Patient will start the Lyrica tonight from pain management. She did not want to take the Lyrica before the EMG. Patient would like flu shot today. She asks if she is due for any pneumonia vaccines. Patient is taking crestor three days a week, but she is running out of that medication too soon. Here for check up  Has seen Dr Vishnu Medellin for the right foot pain/neuropathy  She felt it was likely coming from her low back so EMG done today  Showed lumbar radiculopathy  Will get lumbar xrays and then might refer back to Dr Tania Deras for possible injetion'  If she needs and MRI she did say she would need ativan to help relax her  She had trouble with her recent CT    She also has lyrica from [facility] for the nexk pain and headaches and plans to start that tonight  This may help with the right foot pain as well    Labs reviwed  Lipids'  Are good, reviwed with her at length  Continue statin, crestor 3 days a week    HTN  Readings good no issues  She is taking meds and feels well    GERD has been stable  No issues  Cotninue protonix    Thyroid has been stable  No issues labs good  Cotninue the same dose      Review of Systems   Constitutional:  Negative for appetite change, fatigue and fever. HENT:  Negative for congestion, ear pain, sinus pain, sore throat and trouble swallowing. Eyes:  Negative for pain. Respiratory:  Negative for cough, chest tightness, shortness of breath and wheezing. Cardiovascular:  Negative for chest pain, palpitations and leg swelling. Gastrointestinal:  Negative for abdominal pain, constipation, diarrhea, nausea and vomiting. Endocrine: Negative for cold intolerance and heat intolerance.    Genitourinary:  Negative for difficulty urinating, dysuria, hematuria and pelvic pain. Musculoskeletal:  Negative for back pain, gait problem and joint swelling. Skin:  Negative for rash and wound. Neurological:  Positive for numbness. Negative for dizziness, syncope, light-headedness and headaches. Hematological:  Negative for adenopathy. Psychiatric/Behavioral:  Negative for confusion, sleep disturbance and suicidal ideas. Current Outpatient Medications:     levothyroxine (SYNTHROID) 75 MCG tablet, Take 1 tablet by mouth daily, Disp: 90 tablet, Rfl: 2    pantoprazole (PROTONIX) 40 MG tablet, Take 1 tablet by mouth daily, Disp: 90 tablet, Rfl: 2    rosuvastatin (CRESTOR) 20 MG tablet, Take 1 tablet by mouth daily, Disp: 90 tablet, Rfl: 1    verapamil (CALAN) 120 MG tablet, Take 1 tablet by mouth 2 times daily, Disp: 180 tablet, Rfl: 2    clobetasol (TEMOVATE) 0.05 % ointment, Apply topically daily Apply topically 2 times daily. , Disp: 30 g, Rfl: 1    pregabalin (LYRICA) 25 MG capsule, qHS x 7 days then BID x 7 days then TID thereafter, Disp: 69 capsule, Rfl: 0    clobetasol (TEMOVATE) 0.05 % ointment, Apply topically as needed , Disp: , Rfl:     fluocinonide (LIDEX) 0.05 % ointment, Apply topically as needed , Disp: , Rfl:     aspirin 325 MG EC tablet, Take 325 mg by mouth daily, Disp: , Rfl:   Allergies   Allergen Reactions    Erythromycin     Sulfa Antibiotics       Past Medical History:   Diagnosis Date    Aneurysm ([facility]     Aneurysm ([facility]     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, with stent in left internal  carotid artery    Cerebral artery occlusion with cerebral infarction ([facility]     GERD (gastroesophageal reflux disease)     Headache     Hyperlipidemia     Hypertension     Hypothyroidism     Occipital neuralgia 4/12/2022    Osteoporosis     Thyroid disease      Patient Active Problem List    Diagnosis Date Noted    Chronic pain syndrome 05/18/2022    Cervical disc disorder 05/18/2022    Cervicalgia 05/18/2022    Myalgia 05/18/2022    Osteopenia after menopause 04/26/2022    Occipital neuralgia 04/12/2022    Poison ivy dermatitis 07/06/2021    Vitamin D deficiency 03/19/2020    Hyperlipidemia 09/25/2019    Recurrent occipital headache 08/06/2019    Hypothyroidism 08/06/2019    GERD (gastroesophageal reflux disease) 08/06/2019    Peroneal tendinitis of right lower extremity 05/30/2019    Pain in right foot 05/14/2019    Essential hypertension 11/14/2016    Cerebral aneurysm, nonruptured 05/21/2009      Past Surgical History:   Procedure Laterality Date    CAROTID ENDARTERECTOMY      HEMORRHOID SURGERY      HYSTERECTOMY (CERVIX STATUS UNKNOWN)      Partial at age 30    JOINT REPLACEMENT Bilateral     knee    KNEE ARTHROPLASTY Bilateral 2010    left sided stroke afterward, residual rt side weakness    OVARY REMOVAL      at age 26      Social History       Tobacco History       Smoking Status  Former Smoking Start Date  1/1/1971 Quit Date  1/1/1981 Smoking Frequency  1 pack/day for 10.00 years (10.00 pk-yrs)    Smoking Tobacco Type  Cigarettes from 1/1/1971 to 1/1/1981      Smokeless Tobacco Use  Never              Alcohol History       Alcohol Use Status  Yes Drinks/Week  1 Glasses of wine, 0 Cans of beer, 0 Shots of liquor, 0 Drinks containing 0.5 oz of alcohol per week Amount  1.0 standard drink of alcohol/wk Comment  occas g;ass of wine              Drug Use       Drug Use Status  No              Sexual Activity       Sexually Active  Not Currently Partners  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 Constitutional:       General: She is not in acute distress. Appearance: She is well-developed. She is not ill-appearing or toxic-appearing. HENT:      Head: Normocephalic and atraumatic.       Right Ear: Tympanic membrane normal.      Left Ear: Tympanic membrane normal.      Nose: Nose normal.      Mouth/Throat:      Mouth: Mucous membranes are moist.   Eyes:      Pupils: Pupils are equal, round, and reactive to light. Cardiovascular:      Rate and Rhythm: Normal rate and regular rhythm. Pulmonary:      Effort: Pulmonary effort is normal.      Breath sounds: Normal breath sounds. Abdominal:      General: Bowel sounds are normal.      Palpations: Abdomen is soft. Musculoskeletal:      Cervical back: Normal range of motion. Comments: Gait steady   Skin:     General: Skin is warm and dry. Neurological:      Mental Status: She is alert and oriented to person, place, and time. Psychiatric:         Mood and Affect: Mood normal.         Thought Content: Thought content normal.        Jon Wilson was seen today for hypothyroidism, hypertension and hyperlipidemia. Diagnoses and all orders for this visit:    Essential hypertension  Comments:  blood pressures much better at home 120/70's mostly  no higher readings, feeling much better  stress test reviewed again,  continue verapamil  120mg twice a day  Orders:  -     verapamil (CALAN) 120 MG tablet; Take 1 tablet by mouth 2 times daily    Acquired hypothyroidism  Comments:  stable  labs reviwed no changes  Orders:  -     levothyroxine (SYNTHROID) 75 MCG tablet; Take 1 tablet by mouth daily    Gastroesophageal reflux disease without esophagitis  Comments:  with diet changes has been great  pantoprazoel working with no issues  Orders:  -     pantoprazole (PROTONIX) 40 MG tablet; Take 1 tablet by mouth daily    Mixed hyperlipidemia  Comments:  improved with just crestor twice a week'  will continue this  nosisde effects, no leg cramps  Orders:  -     rosuvastatin (CRESTOR) 20 MG tablet;  Take 1 tablet by mouth daily    Need for immunization against influenza  Comments:  flu shot today  pneumovax 23 next week  Orders:  -     Influenza, FLUAD, (age 72 y+), IM, Preservative Free, 0.5 mL    Lumbar radiculopathy  Comments:  was on EMG today  will get lumbar xrays  she needs to try the Lyrica  Orders:  -     XR LUMBAR SPINE (MIN 4 VIEWS); Future      I independently reviewed and updated the chief complaint, HPI, past medical and surgical history, medications, allergies and ROS as entered by the LPN. Seen by:   Jaclyn Mckenzie DO

## 2022-10-18 NOTE — PROGRESS NOTES
[facility]  Electrodiagnostic Laboratory  *Accredited by the [facility] with exemplary status  [address]  Phone: [phone]  Fax: [phone]    Referring Provider: Alec Wright DO  Primary Care Physician: Nii Royal DO  Patient Name: Lisa Lee  Patient YOB: 1950  Gender: female  BMI: Body mass index is 33.47 kg/m². Blood pressure (!) 150/90, height 5' 4\" (1.626 m), weight 195 lb (88.5 kg). 10/18/2022    Description of clinical problem: c/o right leg radicular pain. Hx venous stasis dermatitis of right lower extremity. History cellulitis of right leg. Pain Yes, right leg   ; Numbness/tingling  No; Weakness  No       Brief physical exam:   Sensory deficit No; Weakness No; Atrophy  No; Reflex abnormality No  Limited neurologic exam performed of the right lower extremity shows grossly intact 5/5 power and normal motor tone. There is no foot drop. Sensory: Grossly intact subjectively to light touch and sharp stick testing. Musculoskeletal: Negative straight leg raising test.  No foot drop. Mild peripheral ankle/pedal edema. DTRs: 1-2+. No ankle clonus. Study Limitations:  pain, rt. Leg; adipose. Full Name: Gil Tao Gender: Female  MRN: [id number] YOB: 1950      Visit Date: 10/18/2022 08:54  Age: 74 Years 0 Months Old  Examining Physician: Dr. Mario Cole   Referring Physician: ALBA MILLER  Technician: Cheryl Payan   Height: 5 feet 4 inch  Weight: 195 lbs  Notes: Right leg pain      Motor NCS      Nerve / Sites Latency Amplitude Amp. 1-2 Distance Lat Diff Velocity Temp.    ms mV % cm ms m/s °C   R Peroneal - EDB      Ankle 5.47 3.6 100 8   31      Pop fossa 13.39 3.4 92.2 37 7.92 47 31.1   L Peroneal - EDB      Ankle 5.52 1.1 100 8   32.4      Pop fossa 13.65 0.2 19.1 37 8.13 46 32.5   R Tibial - AH      Ankle 3.75 5.6 100 8   31.9      Pop fossa 14.17 4.1 74 41 10.42 39 31.9             Sensory NCS Nerve / Sites Onset Lat Peak Lat PP Amp Amp. 1-2 Distance Velocity Temp.    ms ms µV % cm m/s °C   R Sural - Ankle (Calf)      Calf NR NR NR NR 14 NR 31.5   R Superficial peroneal - Ankle      Lat leg NR NR NR NR 10 NR 31.7   L Superficial peroneal - Ankle      Lat leg NR NR NR NR 10 NR 32.6             F  Wave      Nerve F Lat M Lat F-M Lat    ms ms ms   R Peroneal - EDB 49.0 5.4 43.5   R Tibial - AH 53.4 3.8 49.6   L Peroneal - EDB 50.3 5.7 44.5       H Reflex      Nerve Lat Hmax    ms   R Tibial - Soleus 30.6   L Tibial - Soleus 29.1       EMG         EMG Summary Table     Spontaneous MUAP Recruitment   Muscle IA Fib PSW Fasc H.F. Amp Dur. PPP Pattern   R. Tibialis anterior Normal None None None None Normal Normal None Normal   R. Gastrocnemius (Medial head) Normal None None None None 2+ 2+ None Reduced   R. Flexor digitorum longus Normal None None None None 1+ 1+ None Reduced   R. Extensor digitorum brevis Normal None None None None 2+ 2+ None Reduced   R. Abductor hallucis Normal None None None None Giant 2+ None Reduced   R. Vastus lateralis Normal None None None None Normal 2+ None Reduced   R. Gluteus medius Normal None None None None Normal Normal None Normal   R. Sacral paraspinals (Upper) Normal None None None None Normal Normal None Unable to relax   R. Lumbar paraspinals (low) Normal None None None None Normal Normal None Unable to Relax       Summary of Findings:   Nerve conduction studies: The following nerve conduction studies were abnormal:   The right sural and superficial peroneal sensory nerve conductions and left superficial peroneal sensory nerve conduction shows no response. The left peroneal motor nerve conductions (recording from the extensor digitorum brevis muscle) is normal in distal latency and amplitude with normal motor nerve conduction velocity but reduced in amplitude compared to the right side.   All other nerve conduction studies listed in the table above were normal in latency, amplitude and conduction velocity. Needle EMG:   Needle EMG was performed using a concentric needle. The following abnormalities were seen on needle EMG: Enlarged motor unit potentials in duration and amplitude with decreased recruitment (loss of motor units) in primarily a right lumbosacral (I.e., L5/S1 myotomal distribution) with chronic denervation of mostly a moderately severe degree. All other muscles tested, as listed in the table above demonstrated normal amplitude, duration, phases and recruitment and no active denervation signs were seen. Diagnostic Interpretation: This study was abnormal.     Electrodiagnosis: There is electrodiagnostic evidence of a chronic right lumbosacral radiculopathy (I.e., primarily L5/S1 myotomal distribution) with chronic denervation of a moderately severe degree. A small fiber sensory neuropathy cannot be assessed by means of electrodiagnostic testing. There is not a prior study for comparison. Clinical correlation is recommended. Technologist: TELLY/LA  Physician: Gertrude Forte MD    Nerve conduction studies and electromyography were performed according to our laboratory policies and procedures which can be provided upon request. All abnormal values are identified in the table.  Laboratory normal values can also be provided upon request.       Cc: DO Evangelina Ingram DO

## 2022-10-28 ENCOUNTER — NURSE ONLY (OUTPATIENT)
Dept: FAMILY MEDICINE CLINIC | Age: 72
End: 2022-10-28
Payer: MEDICARE

## 2022-10-28 DIAGNOSIS — Z23 NEED FOR PNEUMOCOCCAL VACCINATION: Primary | ICD-10-CM

## 2022-10-28 PROCEDURE — G0009 ADMIN PNEUMOCOCCAL VACCINE: HCPCS | Performed by: FAMILY MEDICINE

## 2022-10-28 PROCEDURE — 90732 PPSV23 VACC 2 YRS+ SUBQ/IM: CPT | Performed by: FAMILY MEDICINE

## 2022-10-28 PROCEDURE — 99999 PR OFFICE/OUTPT VISIT,PROCEDURE ONLY: CPT | Performed by: FAMILY MEDICINE

## 2023-01-03 ENCOUNTER — OFFICE VISIT (OUTPATIENT)
Dept: FAMILY MEDICINE CLINIC | Age: 73
End: 2023-01-03
Payer: MEDICARE

## 2023-01-03 VITALS
TEMPERATURE: 97.9 F | HEART RATE: 70 BPM | WEIGHT: 208.4 LBS | SYSTOLIC BLOOD PRESSURE: 130 MMHG | HEIGHT: 64 IN | OXYGEN SATURATION: 98 % | DIASTOLIC BLOOD PRESSURE: 70 MMHG | BODY MASS INDEX: 35.58 KG/M2

## 2023-01-03 DIAGNOSIS — Z01.818 PRE-OP EXAM: ICD-10-CM

## 2023-01-03 DIAGNOSIS — N39.41 URINARY INCONTINENCE, URGE: ICD-10-CM

## 2023-01-03 DIAGNOSIS — I10 ESSENTIAL HYPERTENSION: Primary | ICD-10-CM

## 2023-01-03 PROCEDURE — 93000 ELECTROCARDIOGRAM COMPLETE: CPT | Performed by: FAMILY MEDICINE

## 2023-01-03 PROCEDURE — 1123F ACP DISCUSS/DSCN MKR DOCD: CPT | Performed by: FAMILY MEDICINE

## 2023-01-03 PROCEDURE — 3075F SYST BP GE 130 - 139MM HG: CPT | Performed by: FAMILY MEDICINE

## 2023-01-03 PROCEDURE — 3078F DIAST BP <80 MM HG: CPT | Performed by: FAMILY MEDICINE

## 2023-01-03 PROCEDURE — 99213 OFFICE O/P EST LOW 20 MIN: CPT | Performed by: FAMILY MEDICINE

## 2023-01-03 RX ORDER — TOLTERODINE 2 MG/1
2 CAPSULE, EXTENDED RELEASE ORAL DAILY
Qty: 90 CAPSULE | Refills: 1 | Status: SHIPPED | OUTPATIENT
Start: 2023-01-03

## 2023-01-03 ASSESSMENT — PATIENT HEALTH QUESTIONNAIRE - PHQ9
SUM OF ALL RESPONSES TO PHQ QUESTIONS 1-9: 0
SUM OF ALL RESPONSES TO PHQ9 QUESTIONS 1 & 2: 0
SUM OF ALL RESPONSES TO PHQ QUESTIONS 1-9: 0
1. LITTLE INTEREST OR PLEASURE IN DOING THINGS: 0
2. FEELING DOWN, DEPRESSED OR HOPELESS: 0
SUM OF ALL RESPONSES TO PHQ QUESTIONS 1-9: 0
SUM OF ALL RESPONSES TO PHQ QUESTIONS 1-9: 0

## 2023-01-03 ASSESSMENT — ENCOUNTER SYMPTOMS
CONSTIPATION: 0
COLOR CHANGE: 1
NAUSEA: 0
COUGH: 0
ABDOMINAL PAIN: 0
SHORTNESS OF BREATH: 0
SINUS PAIN: 0
SORE THROAT: 0
WHEEZING: 0
VOMITING: 0
CHEST TIGHTNESS: 0
TROUBLE SWALLOWING: 0
DIARRHEA: 0
BACK PAIN: 0
EYE PAIN: 0

## 2023-01-03 NOTE — PROGRESS NOTES
1/3/23    Name: Yeny Schreiber  MARKOS:[birth year]   Sex:female   Age:72 y.o. Chief Complaint   Patient presents with    Pre-op Exam    Incontinence     Patient presents to office for pre op exam. She will be having cataract surgery on 1/16/23. Patient says she has had an increase in urinary incontinence lately and asks if there is a medication that can help with this. Patient does have a bruise on her chin because she fell out of bed recently. Patient believes she was dreaming and hit her chin off the bed side table. Here for a check up  Pre op exam-cataract surgery in a few weeks  She is doing well    HTN  Readings great  Taking meds and doing very well  No changes  EKG no acute changes  Medically stable for cataract surgery    Having some in continence-urge  She is trying to make more bathroom trips, staying hydrated but having trouble  Has to get to the bathroom immediately  Will try detrol but she wants to wait till after surgery    Review of Systems   Constitutional:  Negative for appetite change, fatigue and fever. HENT:  Negative for congestion, ear pain, sinus pain, sore throat and trouble swallowing. Eyes:  Negative for pain. Respiratory:  Negative for cough, chest tightness, shortness of breath and wheezing. Cardiovascular:  Negative for chest pain, palpitations and leg swelling. Gastrointestinal:  Negative for abdominal pain, constipation, diarrhea, nausea and vomiting. Endocrine: Negative for cold intolerance and heat intolerance. Genitourinary:  Positive for enuresis. Negative for difficulty urinating, dysuria, hematuria and pelvic pain. Musculoskeletal:  Negative for back pain, gait problem and joint swelling. Skin:  Positive for color change. Negative for rash and wound. Neurological:  Negative for dizziness, syncope, light-headedness and headaches. Hematological:  Negative for adenopathy.    Psychiatric/Behavioral:  Negative for confusion, dysphoric mood, self-injury, sleep disturbance and suicidal ideas. The patient is not nervous/anxious. Current Outpatient Medications:     tolterodine (DETROL LA) 2 MG extended release capsule, Take 1 capsule by mouth daily, Disp: 90 capsule, Rfl: 1    levothyroxine (SYNTHROID) 75 MCG tablet, Take 1 tablet by mouth daily, Disp: 90 tablet, Rfl: 2    pantoprazole (PROTONIX) 40 MG tablet, Take 1 tablet by mouth daily, Disp: 90 tablet, Rfl: 2    rosuvastatin (CRESTOR) 20 MG tablet, Take 1 tablet by mouth daily, Disp: 90 tablet, Rfl: 1    verapamil (CALAN) 120 MG tablet, Take 1 tablet by mouth 2 times daily, Disp: 180 tablet, Rfl: 2    clobetasol (TEMOVATE) 0.05 % ointment, Apply topically daily Apply topically 2 times daily. , Disp: 30 g, Rfl: 1    clobetasol (TEMOVATE) 0.05 % ointment, Apply topically as needed , Disp: , Rfl:     fluocinonide (LIDEX) 0.05 % ointment, Apply topically as needed , Disp: , Rfl:     aspirin 325 MG EC tablet, Take 325 mg by mouth daily, Disp: , Rfl:   Allergies   Allergen Reactions    Erythromycin     Sulfa Antibiotics       Past Medical History:   Diagnosis Date    Aneurysm ([facility]     Aneurysm ([address]     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, with stent in left internal  carotid artery    Cerebral artery occlusion with cerebral infarction ([facility]     GERD (gastroesophageal reflux disease)     Headache     Hyperlipidemia     Hypertension     Hypothyroidism     Occipital neuralgia 4/12/2022    Osteoporosis     Thyroid disease      Patient Active Problem List    Diagnosis Date Noted    Chronic pain syndrome 05/18/2022    Cervical disc disorder 05/18/2022    Cervicalgia 05/18/2022    Myalgia 05/18/2022    Osteopenia after menopause 04/26/2022    Occipital neuralgia 04/12/2022    Poison ivy dermatitis 07/06/2021    Vitamin D deficiency 03/19/2020    Hyperlipidemia 09/25/2019    Recurrent occipital headache 08/06/2019    Hypothyroidism 08/06/2019    GERD (gastroesophageal reflux disease) 08/06/2019    Peroneal tendinitis of right lower extremity 05/30/2019    Pain in right foot 05/14/2019    Essential hypertension 11/14/2016    Cerebral aneurysm, nonruptured 05/21/2009      Past Surgical History:   Procedure Laterality Date    CAROTID ENDARTERECTOMY      HEMORRHOID SURGERY      HYSTERECTOMY (CERVIX STATUS UNKNOWN)      Partial at age 30    JOINT REPLACEMENT Bilateral     knee    KNEE ARTHROPLASTY Bilateral 2010    left sided stroke afterward, residual rt side weakness    OVARY REMOVAL      at age 26      Social History       Tobacco History       Smoking Status  Former Smoking Start Date  1/1/1971 Quit Date  1/1/1981 Smoking Frequency  1 pack/day for 10.00 years (10.00 pk-yrs)    Smoking Tobacco Type  Cigarettes from 1/1/1971 to 1/1/1981      Smokeless Tobacco Use  Never              Alcohol History       Alcohol Use Status  Yes Drinks/Week  1 Glasses of wine, 0 Cans of beer, 0 Shots of liquor, 0 Drinks containing 0.5 oz of alcohol per week Amount  1.0 standard drink of alcohol/wk Comment  occas g;ass of wine              Drug Use       Drug Use Status  No              Sexual Activity       Sexually Active  Not Currently Partners  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 Constitutional:       General: She is not in acute distress. Appearance: She is well-developed. She is not toxic-appearing. HENT:      Head: Normocephalic and atraumatic. Right Ear: Tympanic membrane normal.      Left Ear: Tympanic membrane normal.   Eyes:      Pupils: Pupils are equal, round, and reactive to light. Cardiovascular:      Rate and Rhythm: Normal rate and regular rhythm. Pulmonary:      Effort: Pulmonary effort is normal. No respiratory distress. Breath sounds: Normal breath sounds. No wheezing or rhonchi.    Musculoskeletal:      Cervical back: Normal range of motion. Comments: Gait steady   Skin:     General: Skin is warm and dry. Neurological:      Mental Status: She is alert and oriented to person, place, and time. Oksana Art was seen today for pre-op exam and incontinence. Diagnoses and all orders for this visit:    Essential hypertension  Comments:  readings very good  she is taking meds, no current issues  EKG NSR no acute changes  Orders:  -     EKG 12 lead; Future  -     EKG 12 lead    Pre-op exam  Comments:  medically stable for cataract surgery    Urinary incontinence, urge  Comments:  will try detrol but she wants to wait till after cataract surgery  Orders:  -     tolterodine (DETROL LA) 2 MG extended release capsule; Take 1 capsule by mouth daily      I independently reviewed and updated the chief complaint, HPI, past medical and surgical history, medications, allergies and ROS as entered by the LPN. Seen by:   Ronnie Cordova DO

## 2023-01-06 ENCOUNTER — OFFICE VISIT (OUTPATIENT)
Dept: PODIATRY | Age: 73
End: 2023-01-06

## 2023-01-06 VITALS
BODY MASS INDEX: 35.7 KG/M2 | TEMPERATURE: 98.2 F | DIASTOLIC BLOOD PRESSURE: 82 MMHG | SYSTOLIC BLOOD PRESSURE: 143 MMHG | WEIGHT: 208 LBS

## 2023-01-06 DIAGNOSIS — M79.674 PAIN IN TOE OF RIGHT FOOT: ICD-10-CM

## 2023-01-06 DIAGNOSIS — G58.8 NEUROMA DIGITAL NERVE: Primary | ICD-10-CM

## 2023-01-06 DIAGNOSIS — L84 CORN OR CALLUS: ICD-10-CM

## 2023-01-06 DIAGNOSIS — M79.675 PAIN IN LEFT TOE(S): ICD-10-CM

## 2023-01-06 DIAGNOSIS — R26.2 DIFFICULTY WALKING: ICD-10-CM

## 2023-01-06 RX ORDER — BUPIVACAINE HYDROCHLORIDE 5 MG/ML
1 INJECTION, SOLUTION PERINEURAL ONCE
Status: COMPLETED | OUTPATIENT
Start: 2023-01-06 | End: 2023-01-06

## 2023-01-06 RX ORDER — BETAMETHASONE SODIUM PHOSPHATE AND BETAMETHASONE ACETATE 3; 3 MG/ML; MG/ML
4 INJECTION, SUSPENSION INTRA-ARTICULAR; INTRALESIONAL; INTRAMUSCULAR; SOFT TISSUE ONCE
Status: COMPLETED | OUTPATIENT
Start: 2023-01-06 | End: 2023-01-06

## 2023-01-06 RX ADMIN — BUPIVACAINE HYDROCHLORIDE 5 MG: 5 INJECTION, SOLUTION PERINEURAL at 14:22

## 2023-01-06 RX ADMIN — BETAMETHASONE SODIUM PHOSPHATE AND BETAMETHASONE ACETATE 4 MG: 3; 3 INJECTION, SUSPENSION INTRA-ARTICULAR; INTRALESIONAL; INTRAMUSCULAR; SOFT TISSUE at 14:21

## 2023-01-06 NOTE — PROGRESS NOTES
23  Dulcy Settler : 1950 Sex: female  Age: 74 y.o. Patient was referred by Ronnie Cordova DO    Chief Complaint   Patient presents with    Toe Pain     Pt states that both feet are bothering her michele the toes right worse then left   Dr. Castañeda Right thinks she has she has neuropathy she has burning tingling   She also has back issues that might be causing her toe pain     [facility]     [facility] Dr. Ronnie Cordova 1/3/2023       SUBJECTIVE this patient who is seen today for right and left foot pain. Has not been seen in several years. Patient relates that she has a callus on her left foot. Patient also relates that she has pain on her right foot. Patient relates that there was some burning and tingling but this is probably coming from a lower back issue. Patient had an EMG showing L4-L5 neuropathy. Patient also relates that her second webspace right foot is very painful and numb feels like a rolled up soccer ball. No trauma noted. Toe Pain     Review of Systems  Const: Denies constitutional symptoms  Musculo: Denies symptoms other than stated above  Skin: Denies symptoms other than stated above       Current Outpatient Medications:     tolterodine (DETROL LA) 2 MG extended release capsule, Take 1 capsule by mouth daily, Disp: 90 capsule, Rfl: 1    levothyroxine (SYNTHROID) 75 MCG tablet, Take 1 tablet by mouth daily, Disp: 90 tablet, Rfl: 2    pantoprazole (PROTONIX) 40 MG tablet, Take 1 tablet by mouth daily, Disp: 90 tablet, Rfl: 2    rosuvastatin (CRESTOR) 20 MG tablet, Take 1 tablet by mouth daily, Disp: 90 tablet, Rfl: 1    verapamil (CALAN) 120 MG tablet, Take 1 tablet by mouth 2 times daily, Disp: 180 tablet, Rfl: 2    clobetasol (TEMOVATE) 0.05 % ointment, Apply topically daily Apply topically 2 times daily. 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    Cerebral artery occlusion with cerebral infarction ([address]     GERD (gastroesophageal reflux disease)     Headache     Hyperlipidemia     Hypertension     Hypothyroidism     Occipital neuralgia 2022    Osteoporosis     Thyroid disease      Past Surgical History:   Procedure Laterality Date    CAROTID ENDARTERECTOMY      HEMORRHOID SURGERY      HYSTERECTOMY (CERVIX STATUS UNKNOWN)      Partial at age 30    JOINT REPLACEMENT Bilateral     knee    KNEE ARTHROPLASTY Bilateral 2010    left sided stroke afterward, residual rt side weakness    OVARY REMOVAL      at age 26     Family History   Problem Relation Age of Onset    Heart Failure Mother     Breast Cancer Maternal Grandmother     Breast Cancer Maternal Aunt      Social History     Socioeconomic History    Marital status:      Spouse name: Not on file    Number of children: Not on file    Years of education: Not on file    Highest education level: Not on file   Occupational History    Not on file   Tobacco Use    Smoking status: Former     Packs/day: 1.00     Years: 10.00     Pack years: 10.00     Types: Cigarettes     Start date: 1971     Quit date: 1981     Years since quittin.0    Smokeless tobacco: Never   Vaping Use    Vaping Use: Never used   Substance and Sexual Activity    Alcohol use:  Yes     Alcohol/week: 1.0 standard drink     Types: 1 Glasses of wine per week     Comment: occas g;ass of wine    Drug use: No    Sexual activity: Not Currently     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   Other Topics Concern    Not on file   Social History Narrative    Not on file     Social Determinants of Health Financial Resource Strain: Not on file   Food Insecurity: Not on file   Transportation Needs: Not on file   Physical Activity: Inactive    Days of Exercise per Week: 0 days    Minutes of Exercise per Session: 0 min   Stress: Not on file   Social Connections: Not on file   Intimate Partner Violence: Not on file   Housing Stability: Not on file       Vitals:    01/06/23 1124   BP: (!) 143/82   Temp: 98.2 °F (36.8 °C)   TempSrc: Temporal   Weight: 208 lb (94.3 kg)       Focused Lower Extremity Physical Exam:  Vitals:    01/06/23 1124   BP: (!) 143/82   Temp: 98.2 °F (36.8 °C)        Foot Exam    General  General Appearance: appears stated age and healthy   Orientation: alert and oriented to person, place, and time       Right Foot/Ankle     Inspection and Palpation  Tenderness: neuroma   Swelling: neuroma   Skin Exam: skin intact; Neurovascular  Dorsalis pedis: 3+  Posterior tibial: 3+  Saphenous nerve sensation: normal  Tibial nerve sensation: normal  Superficial peroneal nerve sensation: normal  Deep peroneal nerve sensation: normal  Sural nerve sensation: normal      Left Foot/Ankle      Inspection and Palpation  Arch: normal  Skin Exam: skin intact; Neurovascular  Dorsalis pedis: 3+  Posterior tibial: 3+  Saphenous nerve sensation: normal  Tibial nerve sensation: normal  Superficial peroneal nerve sensation: normal  Deep peroneal nerve sensation: normal  Sural nerve sensation: normal         Ortho Exam    Vascular: pulses  dp  pt    Capillary Refill Time:   Hair growth  Skin:    Edema:    Neurologic:      Musculoskeletal/ Orthopedic examination:    NAIL   Web space second webspace right foot there is pain with palpation positive Theresa sign  Callus left foot subfive. X-rays were taken of the right foot showing no fractures dislocations splaying of the second and third right digit        Assessment and Plan: I feel the neuropathy type pain is coming from the lower back L4-L5.   The right foot has a neuroma second webspace. Callus left foot. Injection second webspace pad. Right foot. Left foot provide callus. Right foot second webspace injection:  Potential risks, complications, alternative treatment options and procedure prognosis were explained to the patient. Verbal and signed informed consent were obtained from the patient. An antiseptic preparation of the skin was performed with ChloraPrep cc Marcaine plain 1 cc Celestone Soluspan no adverse reaction was observed. The puncture site was covered with an adhesive bandage and the patient was fitted with a removable metatarsal pad. Post injection instructions were given. CALLUS DEBRIDEMENT x1  Verbal informed content was obtained from the patient. The callus lesion on the left foot was debrided with a sterile 15 blade to sub q. It was then debrided,  padded, and an antibiotic ointment was applied to the treated area. Patient tolerated the procedure well with no complications. Rigoberto Rodriguez was seen today for toe pain and callouses. Diagnoses and all orders for this visit:    Neuroma digital nerve  -     XR FOOT RIGHT (MIN 3 VIEWS); Future    Pain in left toe(s)    Pain in toe of right foot    Corn or callus    Difficulty walking      No follow-ups on file. Seen By:  Miriam Hawthorne DPM      Document was created using voice recognition software. Note was reviewed, however may contain grammatical errors.

## 2023-01-30 ENCOUNTER — OFFICE VISIT (OUTPATIENT)
Dept: PODIATRY | Age: 73
End: 2023-01-30
Payer: MEDICARE

## 2023-01-30 VITALS
BODY MASS INDEX: 35.7 KG/M2 | WEIGHT: 208 LBS | TEMPERATURE: 97.9 F | SYSTOLIC BLOOD PRESSURE: 131 MMHG | DIASTOLIC BLOOD PRESSURE: 82 MMHG

## 2023-01-30 DIAGNOSIS — M79.674 PAIN IN TOE OF RIGHT FOOT: ICD-10-CM

## 2023-01-30 DIAGNOSIS — G58.8 NEUROMA DIGITAL NERVE: ICD-10-CM

## 2023-01-30 DIAGNOSIS — M79.675 PAIN IN LEFT TOE(S): ICD-10-CM

## 2023-01-30 DIAGNOSIS — M19.079 ARTHRITIS OF FOOT: Primary | ICD-10-CM

## 2023-01-30 PROCEDURE — 3079F DIAST BP 80-89 MM HG: CPT | Performed by: PODIATRIST

## 2023-01-30 PROCEDURE — 3075F SYST BP GE 130 - 139MM HG: CPT | Performed by: PODIATRIST

## 2023-01-30 PROCEDURE — 99213 OFFICE O/P EST LOW 20 MIN: CPT | Performed by: PODIATRIST

## 2023-01-30 PROCEDURE — 1123F ACP DISCUSS/DSCN MKR DOCD: CPT | Performed by: PODIATRIST

## 2023-01-30 RX ORDER — PREDNISONE 10 MG/1
10 TABLET ORAL DAILY
Qty: 18 TABLET | Refills: 1 | Status: SHIPPED | OUTPATIENT
Start: 2023-01-30 | End: 2023-02-08

## 2023-01-30 NOTE — PROGRESS NOTES
23  Janeal Harada : 1950 Sex: female  Age: 74 y.o. Patient was referred by Ana Paula Sherman DO    Chief Complaint   Patient presents with    Foot Pain     Right foot had one injection which has helped        SUBJECTIVE patient is seen today for follow-up of right foot neuroma and arthritis. The injection has really helped but she still having midfoot type pain specifically when she is walking  Foot Pain     Review of Systems  Const: Denies constitutional symptoms  Musculo: Denies symptoms other than stated above  Skin: Denies symptoms other than stated above       Current Outpatient Medications:     predniSONE (DELTASONE) 10 MG tablet, Take 1 tablet by mouth daily for 9 days 3 tabs  Qd x 3  days   2 tabs qd x 3 days  1 tab qd  X 3 days, Disp: 18 tablet, Rfl: 1    tolterodine (DETROL LA) 2 MG extended release capsule, Take 1 capsule by mouth daily, Disp: 90 capsule, Rfl: 1    levothyroxine (SYNTHROID) 75 MCG tablet, Take 1 tablet by mouth daily, Disp: 90 tablet, Rfl: 2    pantoprazole (PROTONIX) 40 MG tablet, Take 1 tablet by mouth daily, Disp: 90 tablet, Rfl: 2    rosuvastatin (CRESTOR) 20 MG tablet, Take 1 tablet by mouth daily, Disp: 90 tablet, Rfl: 1    verapamil (CALAN) 120 MG tablet, Take 1 tablet by mouth 2 times daily, Disp: 180 tablet, Rfl: 2    clobetasol (TEMOVATE) 0.05 % ointment, Apply topically daily Apply topically 2 times daily. , Disp: 30 g, Rfl: 1    clobetasol (TEMOVATE) 0.05 % ointment, Apply topically as needed , Disp: , Rfl:     fluocinonide (LIDEX) 0.05 % ointment, Apply topically as needed , Disp: , Rfl:     aspirin 325 MG EC tablet, Take 325 mg by mouth daily, Disp: , Rfl:   Allergies   Allergen Reactions    Erythromycin     Sulfa Antibiotics        Past Medical History:   Diagnosis Date    [address]neur[address]     Aneurysm [address] [address]     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, with stent in left internal  carotid artery    Cerebral artery occlusion with cerebral infarction [facility])     GERD (gastroesophageal reflux disease)     Headache     Hyperlipidemia     Hypertension     Hypothyroidism     Occipital neuralgia 2022    Osteoporosis     Thyroid disease      Past Surgical History:   Procedure Laterality Date    CAROTID ENDARTERECTOMY      HEMORRHOID SURGERY      HYSTERECTOMY (CERVIX STATUS UNKNOWN)      Partial at age 30    JOINT REPLACEMENT Bilateral     knee    KNEE ARTHROPLASTY Bilateral 2010    left sided stroke afterward, residual rt side weakness    OVARY REMOVAL      at age 26     Family History   Problem Relation Age of Onset    Heart Failure Mother     Breast Cancer Maternal Grandmother     Breast Cancer Maternal Aunt      Social History     Socioeconomic History    Marital status:      Spouse name: Not on file    Number of children: Not on file    Years of education: Not on file    Highest education level: Not on file   Occupational History    Not on file   Tobacco Use    Smoking status: Former     Packs/day: 1.00     Years: 10.00     Pack years: 10.00     Types: Cigarettes     Start date: 1971     Quit date: 1981     Years since quittin.1    Smokeless tobacco: Never   Vaping Use    Vaping Use: Never used   Substance and Sexual Activity    Alcohol use:  Yes     Alcohol/week: 1.0 standard drink     Types: 1 Glasses of wine per week     Comment: occas g;ass of wine    Drug use: No    Sexual activity: Not Currently     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   Physical Activity: Inactive    Days of Exercise per Week: 0 days    Minutes of Exercise per Session: 0 min   Stress: Not on file   Social Connections: Not on file   Intimate Partner Violence: Not on file   Housing Stability: Not on file       Vitals:    23 1652   BP: 131/82 Temp: 97.9 °F (36.6 °C)   TempSrc: Temporal   Weight: 208 lb (94.3 kg)       Focused Lower Extremity Physical Exam:  Vitals:    01/30/23 1652   BP: 131/82   Temp: 97.9 °F (36.6 °C)        Foot Exam    General  General Appearance: appears stated age and healthy   Orientation: alert and oriented to person, place, and time       Right Foot/Ankle     Inspection and Palpation  Tenderness: bony tenderness and metatarsals   Swelling: dorsum   Arch: normal  Hammertoes: absent  Claw Toes: absent  Skin Exam: skin intact; Neurovascular  Dorsalis pedis: 3+  Posterior tibial: 3+  Saphenous nerve sensation: normal  Tibial nerve sensation: normal  Superficial peroneal nerve sensation: normal  Deep peroneal nerve sensation: normal  Sural nerve sensation: normal  Achilles reflex: 2+  Babinski reflex: 2+    Muscle Strength  Ankle dorsiflexion: 5  Ankle plantar flexion: 5  Ankle inversion: 5  Ankle eversion: 5  Great toe extension: 5  Great toe flexion: 5      Left Foot/Ankle      Inspection and Palpation  Tenderness: bony tenderness and metatarsals   Swelling: dorsum     Neurovascular  Dorsalis pedis: 3+  Posterior tibial: 3+  Saphenous nerve sensation: normal  Tibial nerve sensation: normal  Superficial peroneal nerve sensation: normal  Deep peroneal nerve sensation: normal  Sural nerve sensation: normal  Achilles reflex: 2+  Babinski reflex: 2+    Muscle Strength  Ankle dorsiflexion: 5  Ankle plantar flexion: 5  Ankle inversion: 5  Ankle eversion: 5  Great toe extension: 5  Great toe flexion: 5         Right Ankle Exam     Muscle Strength   Dorsiflexion:  5/5  Plantar flexion:  5/5       Left Ankle Exam     Muscle Strength   The patient has normal left ankle strength. Dorsiflexion:  5/5   Plantar flexion:  5/5           Vascular: pulses  dp  pt    Capillary Refill Time:   Hair growth  Skin:    Edema:    Neurologic:      Musculoskeletal/ Orthopedic examination: Pain with palpation to midfoot right and left foot.   Negative pain with palpation to the second webspace right foot. NAIL  Web space   Derm:        XR FOOT RIGHT (MIN 3 VIEWS)    Result Date: 1/9/2023  EXAMINATION: THREE XRAY VIEWS OF THE RIGHT FOOT 1/6/2023 11:43 am COMPARISON: September 10, 2020 HISTORY: ORDERING SYSTEM PROVIDED HISTORY: Neuroma digital nerve TECHNOLOGIST PROVIDED HISTORY: Reason for exam:->pain FINDINGS: Mild degenerative change 1st MTP joint. No fracture or osseous lesion. Mild splaying at the 2nd interspace without visible mass. Soft tissues unremarkable. No acute fracture or osseous lesion. RECOMMENDATION: Careful clinical correlation and follow up recommended. Assessment and Plan: Patient was placed on prednisone custom shoes will be ordered if needed orthotics will be needed. Reappoint 6 weeks  Ramiro Villagomez was seen today for foot pain. Diagnoses and all orders for this visit:    Arthritis of foot    Neuroma digital nerve    Pain in left toe(s)    Pain in toe of right foot    Other orders  -     predniSONE (DELTASONE) 10 MG tablet; Take 1 tablet by mouth daily for 9 days 3 tabs  Qd x 3  days   2 tabs qd x 3 days  1 tab qd  X 3 days      Return in about 1 month (around 2/28/2023). Seen By:  Oletta Habermann, DPM      Document was created using voice recognition software. Note was reviewed, however may contain grammatical errors.

## 2023-03-13 ENCOUNTER — OFFICE VISIT (OUTPATIENT)
Dept: PODIATRY | Age: 73
End: 2023-03-13
Payer: MEDICARE

## 2023-03-13 VITALS
DIASTOLIC BLOOD PRESSURE: 72 MMHG | SYSTOLIC BLOOD PRESSURE: 118 MMHG | BODY MASS INDEX: 35.7 KG/M2 | TEMPERATURE: 98.2 F | WEIGHT: 208 LBS

## 2023-03-13 DIAGNOSIS — G58.8 NEUROMA DIGITAL NERVE: ICD-10-CM

## 2023-03-13 DIAGNOSIS — M79.674 PAIN IN TOE OF RIGHT FOOT: ICD-10-CM

## 2023-03-13 DIAGNOSIS — I73.00 RAYNAUD'S DISEASE WITHOUT GANGRENE: ICD-10-CM

## 2023-03-13 DIAGNOSIS — M79.675 PAIN IN LEFT TOE(S): ICD-10-CM

## 2023-03-13 DIAGNOSIS — M19.079 ARTHRITIS OF FOOT: Primary | ICD-10-CM

## 2023-03-13 PROCEDURE — 3074F SYST BP LT 130 MM HG: CPT | Performed by: PODIATRIST

## 2023-03-13 PROCEDURE — 1123F ACP DISCUSS/DSCN MKR DOCD: CPT | Performed by: PODIATRIST

## 2023-03-13 PROCEDURE — 3078F DIAST BP <80 MM HG: CPT | Performed by: PODIATRIST

## 2023-03-13 PROCEDURE — 99213 OFFICE O/P EST LOW 20 MIN: CPT | Performed by: PODIATRIST

## 2023-03-13 NOTE — PROGRESS NOTES
23  Gena Page : 1950 Sex: female  Age: 74 y.o. Patient was referred by Alonzo Mullen DO    Chief Complaint   Patient presents with    Foot Pain     F/u foot arthritis neuroma was placed on Prednisone last visit which helped        SUBJECTIVE patient is seen today for follow-up of right foot neuroma and arthritis. The injection has really helped but she still having midfoot type pain specifically when she is walking patient does state that her toes have become cold and discolored off-and-on pain in the midfoot is resolved  Foot Pain   This is a recurrent problem. The current episode started more than 1 year ago. The problem has been resolved. Review of Systems  Const: Denies constitutional symptoms  Musculo: Denies symptoms other than stated above  Skin: Denies symptoms other than stated above       Current Outpatient Medications:     diclofenac sodium (VOLTAREN) 1 % GEL, Apply 4 g topically 4 times daily, Disp: 350 g, Rfl: 1    tolterodine (DETROL LA) 2 MG extended release capsule, Take 1 capsule by mouth daily, Disp: 90 capsule, Rfl: 1    levothyroxine (SYNTHROID) 75 MCG tablet, Take 1 tablet by mouth daily, Disp: 90 tablet, Rfl: 2    pantoprazole (PROTONIX) 40 MG tablet, Take 1 tablet by mouth daily, Disp: 90 tablet, Rfl: 2    rosuvastatin (CRESTOR) 20 MG tablet, Take 1 tablet by mouth daily, Disp: 90 tablet, Rfl: 1    verapamil (CALAN) 120 MG tablet, Take 1 tablet by mouth 2 times daily, Disp: 180 tablet, Rfl: 2    clobetasol (TEMOVATE) 0.05 % ointment, Apply topically daily Apply topically 2 times daily. 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    Cerebral artery occlusion with cerebral infarction ([facility] [address]     GERD (gastroesophageal reflux disease)     Headache     Hyperlipidemia     Hypertension     Hypothyroidism     Occipital neuralgia 2022    Osteoporosis     Thyroid disease      Past Surgical History:   Procedure Laterality Date    CAROTID ENDARTERECTOMY      HEMORRHOID SURGERY      HYSTERECTOMY (CERVIX STATUS UNKNOWN)      Partial at age 30    JOINT REPLACEMENT Bilateral     knee    KNEE ARTHROPLASTY Bilateral 2010    left sided stroke afterward, residual rt side weakness    OVARY REMOVAL      at age 26     Family History   Problem Relation Age of Onset    Heart Failure Mother     Breast Cancer Maternal Grandmother     Breast Cancer Maternal Aunt      Social History     Socioeconomic History    Marital status:      Spouse name: Not on file    Number of children: Not on file    Years of education: Not on file    Highest education level: Not on file   Occupational History    Not on file   Tobacco Use    Smoking status: Former     Packs/day: 1.00     Years: 10.00     Pack years: 10.00     Types: Cigarettes     Start date: 1971     Quit date: 1981     Years since quittin.2    Smokeless tobacco: Never   Vaping Use    Vaping Use: Never used   Substance and Sexual Activity    Alcohol use:  Yes     Alcohol/week: 1.0 standard drink     Types: 1 Glasses of wine per week     Comment: occas g;ass of wine    Drug use: No    Sexual activity: Not Currently     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   Physical Activity: Inactive    Days of Exercise per Week: 0 days    Minutes of Exercise per Session: 0 min   Stress: Not on file   Social Connections: Not on file   Intimate Partner Violence: Not on file   Housing Stability: Not on file       Vitals:    03/13/23 1331   BP: 118/72   Temp: 98.2 °F (36.8 °C)   TempSrc: Temporal   Weight: 208 lb (94.3 kg)       Focused Lower Extremity Physical Exam:  Vitals:    03/13/23 1331   BP: 118/72   Temp: 98.2 °F (36.8 °C)        Foot Exam    General  General Appearance: appears stated age and healthy   Orientation: alert and oriented to person, place, and time       Right Foot/Ankle     Inspection and Palpation  Tenderness: bony tenderness and metatarsals   Swelling: dorsum   Arch: normal  Hammertoes: absent  Claw Toes: absent  Skin Exam: skin intact; Neurovascular  Dorsalis pedis: 3+  Posterior tibial: 3+  Saphenous nerve sensation: normal  Tibial nerve sensation: normal  Superficial peroneal nerve sensation: normal  Deep peroneal nerve sensation: normal  Sural nerve sensation: normal  Achilles reflex: 2+  Babinski reflex: 2+    Muscle Strength  Ankle dorsiflexion: 5  Ankle plantar flexion: 5  Ankle inversion: 5  Ankle eversion: 5  Great toe extension: 5  Great toe flexion: 5      Left Foot/Ankle      Inspection and Palpation  Tenderness: bony tenderness and metatarsals   Swelling: dorsum     Neurovascular  Dorsalis pedis: 3+  Posterior tibial: 3+  Saphenous nerve sensation: normal  Tibial nerve sensation: normal  Superficial peroneal nerve sensation: normal  Deep peroneal nerve sensation: normal  Sural nerve sensation: normal  Achilles reflex: 2+  Babinski reflex: 2+    Muscle Strength  Ankle dorsiflexion: 5  Ankle plantar flexion: 5  Ankle inversion: 5  Ankle eversion: 5  Great toe extension: 5  Great toe flexion: 5         Right Ankle Exam     Muscle Strength   Dorsiflexion:  5/5  Plantar flexion:  5/5       Left Ankle Exam     Muscle Strength   The patient has normal left ankle strength.   Dorsiflexion:  5/5   Plantar flexion:  5/5           Vascular: pulses  dp  pt pulses are palpable  Capillary Refill Time: Less than 2 secs  Hair growth lack of hair  Skin: There is a bluish tint coolness to toes 1 through 5 no ulcers noted  Edema:    Neurologic:      Musculoskeletal/ Orthopedic examination: Pain with palpation to midfoot right and left foot. Negative pain with palpation to the second webspace right foot. NAIL  Web space   Derm:        XR FOOT RIGHT (MIN 3 VIEWS)    Result Date: 1/9/2023  EXAMINATION: THREE XRAY VIEWS OF THE RIGHT FOOT 1/6/2023 11:43 am COMPARISON: September 10, 2020 HISTORY: ORDERING SYSTEM PROVIDED HISTORY: Neuroma digital nerve TECHNOLOGIST PROVIDED HISTORY: Reason for exam:->pain FINDINGS: Mild degenerative change 1st MTP joint. No fracture or osseous lesion. Mild splaying at the 2nd interspace without visible mass. Soft tissues unremarkable. No acute fracture or osseous lesion. RECOMMENDATION: Careful clinical correlation and follow up recommended. Assessment and Plan: Patient was placed on prednisone custom shoes will be ordered if needed orthotics will be needed. Did prescribe Voltaren gel for the arthritis. I did discuss Raynaud's syndrome with her she will continue to wear comfortable wool socks in the winter. Mignon Jones was seen today for foot pain. Diagnoses and all orders for this visit:    Arthritis of foot    Neuroma digital nerve    Pain in left toe(s)    Pain in toe of right foot    Raynaud's disease without gangrene    Other orders  -     diclofenac sodium (VOLTAREN) 1 % GEL; Apply 4 g topically 4 times daily      Return in about 2 months (around 5/13/2023). Seen By:  Alejandro Victoria DPM      Document was created using voice recognition software. Note was reviewed, however may contain grammatical errors.

## 2023-04-06 ENCOUNTER — TELEPHONE (OUTPATIENT)
Dept: FAMILY MEDICINE CLINIC | Age: 73
End: 2023-04-06

## 2023-04-06 DIAGNOSIS — E03.9 ACQUIRED HYPOTHYROIDISM: ICD-10-CM

## 2023-04-06 DIAGNOSIS — I10 ESSENTIAL HYPERTENSION: Primary | ICD-10-CM

## 2023-04-24 DIAGNOSIS — I10 ESSENTIAL HYPERTENSION: ICD-10-CM

## 2023-04-24 DIAGNOSIS — E03.9 ACQUIRED HYPOTHYROIDISM: ICD-10-CM

## 2023-04-24 LAB
ALBUMIN SERPL-MCNC: 4.1 G/DL (ref 3.5–5.2)
ALP SERPL-CCNC: 87 U/L (ref 35–104)
ALT SERPL-CCNC: 17 U/L (ref 0–32)
ANION GAP SERPL CALCULATED.3IONS-SCNC: 15 MMOL/L (ref 7–16)
AST SERPL-CCNC: 22 U/L (ref 0–31)
BASOPHILS # BLD: 0.02 E9/L (ref 0–0.2)
BASOPHILS NFR BLD: 0.5 % (ref 0–2)
BILIRUB SERPL-MCNC: 0.5 MG/DL (ref 0–1.2)
BUN SERPL-MCNC: 10 MG/DL (ref 6–23)
CALCIUM SERPL-MCNC: 9.5 MG/DL (ref 8.6–10.2)
CHLORIDE SERPL-SCNC: 104 MMOL/L (ref 98–107)
CO2 SERPL-SCNC: 23 MMOL/L (ref 22–29)
CREAT SERPL-MCNC: 0.7 MG/DL (ref 0.5–1)
EOSINOPHIL # BLD: 0.07 E9/L (ref 0.05–0.5)
EOSINOPHIL NFR BLD: 1.9 % (ref 0–6)
ERYTHROCYTE [DISTWIDTH] IN BLOOD BY AUTOMATED COUNT: 13.3 FL (ref 11.5–15)
GLUCOSE SERPL-MCNC: 98 MG/DL (ref 74–99)
HCT VFR BLD AUTO: 45 % (ref 34–48)
HGB BLD-MCNC: 14.2 G/DL (ref 11.5–15.5)
IMM GRANULOCYTES # BLD: 0.01 E9/L
IMM GRANULOCYTES NFR BLD: 0.3 % (ref 0–5)
LYMPHOCYTES # BLD: 1.33 E9/L (ref 1.5–4)
LYMPHOCYTES NFR BLD: 35.3 % (ref 20–42)
MCH RBC QN AUTO: 30 PG (ref 26–35)
MCHC RBC AUTO-ENTMCNC: 31.6 % (ref 32–34.5)
MCV RBC AUTO: 94.9 FL (ref 80–99.9)
MONOCYTES # BLD: 0.27 E9/L (ref 0.1–0.95)
MONOCYTES NFR BLD: 7.2 % (ref 2–12)
NEUTROPHILS # BLD: 2.07 E9/L (ref 1.8–7.3)
NEUTS SEG NFR BLD: 54.8 % (ref 43–80)
PLATELET # BLD AUTO: 167 E9/L (ref 130–450)
PMV BLD AUTO: 11.2 FL (ref 7–12)
POTASSIUM SERPL-SCNC: 3.9 MMOL/L (ref 3.5–5)
PROT SERPL-MCNC: 6.2 G/DL (ref 6.4–8.3)
RBC # BLD AUTO: 4.74 E12/L (ref 3.5–5.5)
SODIUM SERPL-SCNC: 142 MMOL/L (ref 132–146)
T4 FREE SERPL-MCNC: 1.39 NG/DL (ref 0.93–1.7)
TSH SERPL-MCNC: 2.28 UIU/ML (ref 0.27–4.2)
WBC # BLD: 3.8 E9/L (ref 4.5–11.5)

## 2023-05-01 ENCOUNTER — OFFICE VISIT (OUTPATIENT)
Dept: FAMILY MEDICINE CLINIC | Age: 73
End: 2023-05-01

## 2023-05-01 ENCOUNTER — OFFICE VISIT (OUTPATIENT)
Dept: FAMILY MEDICINE CLINIC | Age: 73
End: 2023-05-01
Payer: MEDICARE

## 2023-05-01 VITALS
HEART RATE: 70 BPM | DIASTOLIC BLOOD PRESSURE: 72 MMHG | HEIGHT: 64 IN | WEIGHT: 197.53 LBS | BODY MASS INDEX: 33.72 KG/M2 | TEMPERATURE: 98 F | SYSTOLIC BLOOD PRESSURE: 110 MMHG | OXYGEN SATURATION: 97 %

## 2023-05-01 VITALS
HEIGHT: 64 IN | BODY MASS INDEX: 33.73 KG/M2 | DIASTOLIC BLOOD PRESSURE: 72 MMHG | WEIGHT: 197.6 LBS | HEART RATE: 70 BPM | SYSTOLIC BLOOD PRESSURE: 110 MMHG | TEMPERATURE: 98 F | OXYGEN SATURATION: 97 %

## 2023-05-01 DIAGNOSIS — E78.2 MIXED HYPERLIPIDEMIA: ICD-10-CM

## 2023-05-01 DIAGNOSIS — H91.93 BILATERAL HEARING LOSS, UNSPECIFIED HEARING LOSS TYPE: ICD-10-CM

## 2023-05-01 DIAGNOSIS — I10 ESSENTIAL HYPERTENSION: Primary | ICD-10-CM

## 2023-05-01 DIAGNOSIS — N39.46 MIXED STRESS AND URGE URINARY INCONTINENCE: ICD-10-CM

## 2023-05-01 DIAGNOSIS — Z78.0 OSTEOPENIA AFTER MENOPAUSE: ICD-10-CM

## 2023-05-01 DIAGNOSIS — K21.9 GASTROESOPHAGEAL REFLUX DISEASE WITHOUT ESOPHAGITIS: ICD-10-CM

## 2023-05-01 DIAGNOSIS — Z00.00 MEDICARE ANNUAL WELLNESS VISIT, SUBSEQUENT: Primary | ICD-10-CM

## 2023-05-01 DIAGNOSIS — R07.81 RIB PAIN ON LEFT SIDE: ICD-10-CM

## 2023-05-01 DIAGNOSIS — E03.9 ACQUIRED HYPOTHYROIDISM: ICD-10-CM

## 2023-05-01 DIAGNOSIS — M85.80 OSTEOPENIA AFTER MENOPAUSE: ICD-10-CM

## 2023-05-01 PROCEDURE — 3074F SYST BP LT 130 MM HG: CPT | Performed by: FAMILY MEDICINE

## 2023-05-01 PROCEDURE — 1123F ACP DISCUSS/DSCN MKR DOCD: CPT | Performed by: FAMILY MEDICINE

## 2023-05-01 PROCEDURE — 3078F DIAST BP <80 MM HG: CPT | Performed by: FAMILY MEDICINE

## 2023-05-01 PROCEDURE — G0439 PPPS, SUBSEQ VISIT: HCPCS | Performed by: FAMILY MEDICINE

## 2023-05-01 RX ORDER — VERAPAMIL HYDROCHLORIDE 120 MG/1
120 TABLET, FILM COATED ORAL 2 TIMES DAILY
Qty: 180 TABLET | Refills: 1 | Status: SHIPPED | OUTPATIENT
Start: 2023-05-01

## 2023-05-01 RX ORDER — LEVOTHYROXINE SODIUM 0.07 MG/1
75 TABLET ORAL DAILY
Qty: 90 TABLET | Refills: 1 | Status: SHIPPED | OUTPATIENT
Start: 2023-05-01

## 2023-05-01 RX ORDER — ROSUVASTATIN CALCIUM 20 MG/1
TABLET, COATED ORAL
Qty: 24 TABLET | Refills: 1 | Status: SHIPPED | OUTPATIENT
Start: 2023-05-01

## 2023-05-01 RX ORDER — PANTOPRAZOLE SODIUM 40 MG/1
40 TABLET, DELAYED RELEASE ORAL DAILY
Qty: 90 TABLET | Refills: 1 | Status: SHIPPED | OUTPATIENT
Start: 2023-05-01

## 2023-05-01 ASSESSMENT — ENCOUNTER SYMPTOMS
SINUS PAIN: 0
SHORTNESS OF BREATH: 0
COUGH: 0
DIARRHEA: 0
WHEEZING: 0
TROUBLE SWALLOWING: 0
BACK PAIN: 0
NAUSEA: 0
VOMITING: 0
EYE PAIN: 0
CHEST TIGHTNESS: 0
ABDOMINAL PAIN: 0
SORE THROAT: 0
CONSTIPATION: 0

## 2023-05-01 ASSESSMENT — PATIENT HEALTH QUESTIONNAIRE - PHQ9
SUM OF ALL RESPONSES TO PHQ9 QUESTIONS 1 & 2: 0
1. LITTLE INTEREST OR PLEASURE IN DOING THINGS: 0
SUM OF ALL RESPONSES TO PHQ QUESTIONS 1-9: 0
2. FEELING DOWN, DEPRESSED OR HOPELESS: 0
SUM OF ALL RESPONSES TO PHQ QUESTIONS 1-9: 0

## 2023-05-01 ASSESSMENT — LIFESTYLE VARIABLES
HOW MANY STANDARD DRINKS CONTAINING ALCOHOL DO YOU HAVE ON A TYPICAL DAY: 1 OR 2
HOW OFTEN DO YOU HAVE A DRINK CONTAINING ALCOHOL: MONTHLY OR LESS

## 2023-05-01 NOTE — PROGRESS NOTES
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 Constitutional:       General: She is not in acute distress. Appearance: She is well-developed. She is not ill-appearing or toxic-appearing. HENT:      Head: Normocephalic and atraumatic. Right Ear: Tympanic membrane normal.      Left Ear: Tympanic membrane normal.      Nose: No congestion. Eyes:      Pupils: Pupils are equal, round, and reactive to light. Cardiovascular:      Rate and Rhythm: Normal rate and regular rhythm. Pulmonary:      Effort: Pulmonary effort is normal. No respiratory distress. Breath sounds: Normal breath sounds. No wheezing or rhonchi. Musculoskeletal:      Cervical back: Normal range of motion. Comments: Gait steady, much better  Balance good today   Skin:     General: Skin is warm and dry. Neurological:      Mental Status: She is alert and oriented to person, place, and time. Psychiatric:         Mood and Affect: Mood normal.         Thought Content: Thought content normal.        Wendie Gordon was seen today for hypertension, hypothyroidism and hyperlipidemia.     Diagnoses and all orders for this visit:    Essential hypertension  Comments:  blood pressures much better at home 120/70's mostly  no higher
Mrs. Trini Willams present- identified herself as aide/, states pt has no other family members. Mrs. Willams confirmed pt has advanced directives including DNR.  1030p- Dr. Elise covering Dr. Chante jordan.

## 2023-05-01 NOTE — PROGRESS NOTES
lb 8.5 oz (89.6 kg)   Height: 5' 4\" (1.626 m)      Body mass index is 33.91 kg/m². General Appearance: alert and oriented to person, place and time, well developed and well- nourished, in no acute distress  Skin: warm and dry, no rash or erythema  Head: normocephalic and atraumatic  Eyes: pupils equal, round, and reactive to light, extraocular eye movements intact, conjunctivae normal  ENT: tympanic membrane, external ear and ear canal normal bilaterally, nose without deformity, nasal mucosa and turbinates normal without polyps  Neck: supple and non-tender without mass, no thyromegaly or thyroid nodules, no cervical lymphadenopathy  Pulmonary/Chest: clear to auscultation bilaterally- no wheezes, rales or rhonchi, normal air movement, no respiratory distress  Cardiovascular: normal rate, regular rhythm, normal S1 and S2, no murmurs, rubs, clicks, or gallops, distal pulses intact, no carotid bruits  Abdomen: soft, non-tender, non-distended, normal bowel sounds, no masses or organomegaly  Extremities: no cyanosis, clubbing or edema  Musculoskeletal: normal range of motion, no joint swelling, deformity or tenderness  Neurologic: reflexes normal and symmetric, no cranial nerve deficit, gait, coordination and speech normal       Allergies   Allergen Reactions    Erythromycin     Sulfa Antibiotics      Prior to Visit Medications    Medication Sig Taking?  Authorizing Provider   verapamil (CALAN) 120 MG tablet Take 1 tablet by mouth 2 times daily  Shante Johnson DO   rosuvastatin (CRESTOR) 20 MG tablet Take one tablet daily on Tuesday and Saturday only  Shante Johnson DO   pantoprazole (PROTONIX) 40 MG tablet Take 1 tablet by mouth daily  Shante Johnson DO   levothyroxine (SYNTHROID) 75 MCG tablet Take 1 tablet by mouth daily  Shante Johnson DO   diclofenac sodium (VOLTAREN) 1 % GEL Apply 4 g topically 4 times daily  Mario Boast, DPM   tolterodine (DETROL LA) 2 MG extended release capsule Take 1 capsule

## 2023-05-05 ENCOUNTER — TELEPHONE (OUTPATIENT)
Dept: PAIN MANAGEMENT | Age: 73
End: 2023-05-05

## 2023-05-08 NOTE — TELEPHONE ENCOUNTER
Catalina Eaton has not been seen in a long time so I scheduled her for an office visit with Ezequiel Flores in order to get her in as quick as possible since she stated that she is having bad headaches which is what she was seen in the past for.

## 2023-05-08 NOTE — TELEPHONE ENCOUNTER
Okay.  She cannot get a refill until seen as she has not filled her pregabalin since 8/2022 and has not been seen since that time. Thanks!

## 2023-05-11 ENCOUNTER — OFFICE VISIT (OUTPATIENT)
Dept: PAIN MANAGEMENT | Age: 73
End: 2023-05-11
Payer: MEDICARE

## 2023-05-11 VITALS
HEART RATE: 76 BPM | RESPIRATION RATE: 16 BRPM | DIASTOLIC BLOOD PRESSURE: 75 MMHG | SYSTOLIC BLOOD PRESSURE: 128 MMHG | HEIGHT: 64 IN | TEMPERATURE: 98.7 F | OXYGEN SATURATION: 98 % | WEIGHT: 196 LBS | BODY MASS INDEX: 33.46 KG/M2

## 2023-05-11 DIAGNOSIS — M79.10 MYALGIA: Primary | ICD-10-CM

## 2023-05-11 DIAGNOSIS — G89.4 CHRONIC PAIN SYNDROME: ICD-10-CM

## 2023-05-11 DIAGNOSIS — M50.90 CERVICAL DISC DISORDER: ICD-10-CM

## 2023-05-11 DIAGNOSIS — M54.2 CERVICALGIA: ICD-10-CM

## 2023-05-11 PROCEDURE — 99213 OFFICE O/P EST LOW 20 MIN: CPT | Performed by: PHYSICIAN ASSISTANT

## 2023-05-11 PROCEDURE — 3074F SYST BP LT 130 MM HG: CPT | Performed by: PHYSICIAN ASSISTANT

## 2023-05-11 PROCEDURE — 3078F DIAST BP <80 MM HG: CPT | Performed by: PHYSICIAN ASSISTANT

## 2023-05-11 PROCEDURE — 1123F ACP DISCUSS/DSCN MKR DOCD: CPT | Performed by: PHYSICIAN ASSISTANT

## 2023-05-11 RX ORDER — PREGABALIN 25 MG/1
CAPSULE ORAL
Qty: 69 CAPSULE | Refills: 0 | Status: SHIPPED | OUTPATIENT
Start: 2023-05-11 | End: 2023-06-10

## 2023-05-11 NOTE — PROGRESS NOTES
Juanita Goodrich presents to the [facility] on 5/11/2023. Melanie Kilpatrick is complaining of pain headaches . The pain is constant. The pain is described as aching. Pain is rated on her best day at a 1, on her worst day at a 10, and on average at a 5 on the VAS scale. She took her last dose of Tylenol PRN. Any procedures since your last visit: No  Pacemaker or defibrillator: No   She is not on NSAIDS and is  on anticoagulation medications to include ASA and is managed by Jimenez Morgan DO  .     Medication Contract and Consent for Opioid Use Documents Filed        No documents found                    /75   Pulse 76   Temp 98.7 °F (37.1 °C) (Infrared)   Resp 16   Ht 5' 4\" (1.626 m)   Wt 196 lb (88.9 kg)   SpO2 98%   BMI 33.64 kg/m²      No LMP recorded. Patient has had a hysterectomy.
of Exercise per Session: 20 min   Stress: Not on file   Social Connections: Not on file   Intimate Partner Violence: Not on file   Housing Stability: Not on file       Family History   Problem Relation Age of Onset    Heart Failure Mother     Breast Cancer Maternal Grandmother     Breast Cancer Maternal Aunt        REVIEW OF SYSTEMS:     Suzie Love denies fever/chills, chest pain, shortness of breath, new bowel or bladder complaints. All other review of systems was negative. PHYSICAL EXAMINATION:      /75   Pulse 76   Temp 98.7 °F (37.1 °C) (Infrared)   Resp 16   Ht 5' 4\" (1.626 m)   Wt 196 lb (88.9 kg)   SpO2 98%   BMI 33.64 kg/m²     General:      General appearance:   pleasant and well-hydrated. , in moderate discomfort and A & O x3  Build:Overweight    HEENT:    Head:normocephalic and atraumatic  Sclera: icterus absent,    Lungs:    Breathing:Normal expansion. No wheezing. Abdomen:    Shape:non-distended and normal    Cervical spine:    Inspection:normal  Palpation:Range of motion:abnormal mildly flexion, extension rotation bilateral and is  painful. Extremities:    Tremors:None bilaterally upper and lower  Range of motion:Generally normal shoulders, pain with internal rotation of hips not done. Intact:Yes  Edema:Normal    Neurological:    [address] [facility]    Dermatology:    Skin:no unusual rashes, no skin lesions, no palpable subcutaneous nodules, and good skin turgor    Impression:    Chronic intermittent headaches since early 2000's  Had GONB x2, first one helpful, second one not helpful  Exam by Dr. Sasha Schwarz showed some cervical paraspinal and occipital muscle tension     Pain was gone for two hours after the TPI thus will plan to repeat with steroids in hopes of longevity. B/L ordered today WITH STEROIDS. If ineffective, consider GONB.        Plan:     Urine screen deferred  OARRS report reviewed  Pregabalin low dose titration - states that she only tried it for 3 weeks when she had a

## 2023-05-12 ENCOUNTER — PROCEDURE VISIT (OUTPATIENT)
Dept: PAIN MANAGEMENT | Age: 73
End: 2023-05-12
Payer: MEDICARE

## 2023-05-12 VITALS
SYSTOLIC BLOOD PRESSURE: 164 MMHG | HEIGHT: 64 IN | WEIGHT: 196 LBS | OXYGEN SATURATION: 96 % | DIASTOLIC BLOOD PRESSURE: 78 MMHG | RESPIRATION RATE: 16 BRPM | HEART RATE: 86 BPM | BODY MASS INDEX: 33.46 KG/M2 | TEMPERATURE: 97.8 F

## 2023-05-12 DIAGNOSIS — G89.4 CHRONIC PAIN SYNDROME: Primary | ICD-10-CM

## 2023-05-12 DIAGNOSIS — M79.10 MYALGIA: ICD-10-CM

## 2023-05-12 PROCEDURE — 20553 NJX 1/MLT TRIGGER POINTS 3/>: CPT | Performed by: PAIN MEDICINE

## 2023-05-12 PROCEDURE — 76942 ECHO GUIDE FOR BIOPSY: CPT | Performed by: PAIN MEDICINE

## 2023-05-12 RX ORDER — BUPIVACAINE HYDROCHLORIDE 2.5 MG/ML
10 INJECTION, SOLUTION EPIDURAL; INFILTRATION; INTRACAUDAL ONCE
Status: COMPLETED | OUTPATIENT
Start: 2023-05-12 | End: 2023-05-12

## 2023-05-12 RX ADMIN — BUPIVACAINE HYDROCHLORIDE 25 MG: 2.5 INJECTION, SOLUTION EPIDURAL; INFILTRATION; INTRACAUDAL at 15:48

## 2023-05-12 NOTE — PROGRESS NOTES
5/12/2023    Chief Complaint   Patient presents with    Injections     Trigger point injections. Allergies as of 05/12/2023 - Fully Reviewed 05/12/2023   Allergen Reaction Noted    Erythromycin  04/20/2017    Sulfa antibiotics  02/22/2018        Procedure: Trigger point injection     y consent signed y procedure verified with patient  y allergies noted y pt confirms not pregnant    Patient rates pain a 3/10 on the VAS scale. Skin Prep:  ChloraPrep    Anesthetic:  n/a    Medication:  Marcaine 0.25% and DepMedrol    Vitals:    05/12/23 1417   BP: (!) 164/78   Pulse: 86   Resp: 16   Temp: 97.8 °F (36.6 °C)   TempSrc: Infrared   SpO2: 96%   Weight: 196 lb (88.9 kg)   Height: 5' 4\" (1.626 m)       I witnessed patient signing consent to Medical Procedure and Treatment form.      Chidi Plaza RN

## 2023-05-12 NOTE — PROGRESS NOTES
2023    Patient: Duglas García  :  1950  Age:  74 y.o. Sex:  female     PRE-PROCEDURE DIAGNOSIS: Myofascial pain. POST-PROCEDURE DIAGNOSIS: Same. PROCEDURE: bilateral cervical paraspinal, trapezius, rhomboid, supraspinatus trigger point injections under ultrasound guidance. SURGEON:   EMILY Kennedy. ANESTHESIA: local    ESTIMATED BLOOD LOSS:  None.  ______________________________________________________________________    BRIEF HISTORY: Duglas García comes in today for bilateral cervical paraspinal, trapezius, rhomboid, supraspinatus trigger point injection. After discussing the potential risks and benefits of the procedure with the patient. Joel Edwards did request that we proceed. A complete History & Physical was reviewed and it is unchanged. DESCRIPTION OF PROCEDURE:   Each trigger point was marked with patient input, prepped with chloraprep and draped. A #25-gauge, 1.5-inch needle was passed into the area of greatest tenderness under ultrasound guidance. Each trigger point received equal, divided dosages or a total of 10 cc of 0.25% Marcaine after negative aspiration of blood, air or paresthesia with ultrasound visualization of solution spread. The needle was removed intact and Band-Aids were applied. Disposition the patient tolerated the procedure well and there were no complications . Joel Edwards will follow up in our [address] [place] [address] as scheduled. She was encouraged to call with questions, concerns or if worsening of symptoms occurs.     Lizbeth Pickard DO

## 2023-05-19 ENCOUNTER — TELEPHONE (OUTPATIENT)
Dept: PAIN MANAGEMENT | Age: 73
End: 2023-05-19

## 2023-05-19 NOTE — TELEPHONE ENCOUNTER
Lisa Lee called in because she had a procedure done a week ago and she is still in pain. She said she is taking lyrica, but wanted to know if there is anything else to help her pain. Next appointment is 6/8/23. Please advise. Thanks.

## 2023-05-19 NOTE — TELEPHONE ENCOUNTER
Is the lyrica helping? She has a whole titration script that will be increasing it over which may help with her pain. If that doesn't help, Dr. Giovanni Hurtado can try doing an occipital nerve block again.

## 2023-05-19 NOTE — TELEPHONE ENCOUNTER
Left Edgar Junior a message asking if her lyrica is working, and to let her know she is on a titration script and it will be increasing, which may help with the pain. Awaiting a call back.

## 2023-05-22 ENCOUNTER — OFFICE VISIT (OUTPATIENT)
Dept: PODIATRY | Age: 73
End: 2023-05-22
Payer: MEDICARE

## 2023-05-22 VITALS
DIASTOLIC BLOOD PRESSURE: 78 MMHG | WEIGHT: 196 LBS | HEIGHT: 64 IN | BODY MASS INDEX: 33.46 KG/M2 | SYSTOLIC BLOOD PRESSURE: 128 MMHG | TEMPERATURE: 98.4 F

## 2023-05-22 DIAGNOSIS — M79.675 PAIN IN LEFT TOE(S): ICD-10-CM

## 2023-05-22 DIAGNOSIS — M19.079 ARTHRITIS OF FOOT: Primary | ICD-10-CM

## 2023-05-22 DIAGNOSIS — M79.674 PAIN IN TOE OF RIGHT FOOT: ICD-10-CM

## 2023-05-22 DIAGNOSIS — G58.8 NEUROMA DIGITAL NERVE: ICD-10-CM

## 2023-05-22 PROCEDURE — 3074F SYST BP LT 130 MM HG: CPT | Performed by: PODIATRIST

## 2023-05-22 PROCEDURE — 3078F DIAST BP <80 MM HG: CPT | Performed by: PODIATRIST

## 2023-05-22 PROCEDURE — 1123F ACP DISCUSS/DSCN MKR DOCD: CPT | Performed by: PODIATRIST

## 2023-05-22 PROCEDURE — 99212 OFFICE O/P EST SF 10 MIN: CPT | Performed by: PODIATRIST

## 2023-05-22 NOTE — PROGRESS NOTES
23  Jackie Raymundo : 1950 Sex: female  Age: 74 y.o. Patient was referred by Noah Elder DO    Chief Complaint   Patient presents with    Foot Pain     Bilateral foot pain. Pain is very mild today and she considered cancelling the appointment. PCP is Dr. Noah Elder, last seen 2023. SUBJECTIVE patient is seen today for bilateral foot pain. Patient is doing excellent with no issues today  HPI  Review of Systems  Const: Denies constitutional symptoms  Musculo: Denies symptoms other than stated above  Skin: Denies symptoms other than stated above       Current Outpatient Medications:     pregabalin (LYRICA) 25 MG capsule, qHS x 7 days then BID x 7 days then TID thereafter, Disp: 69 capsule, Rfl: 0    verapamil (CALAN) 120 MG tablet, Take 1 tablet by mouth 2 times daily, Disp: 180 tablet, Rfl: 1    rosuvastatin (CRESTOR) 20 MG tablet, Take one tablet daily on Tuesday and Saturday only, Disp: 24 tablet, Rfl: 1    pantoprazole (PROTONIX) 40 MG tablet, Take 1 tablet by mouth daily, Disp: 90 tablet, Rfl: 1    levothyroxine (SYNTHROID) 75 MCG tablet, Take 1 tablet by mouth daily, Disp: 90 tablet, Rfl: 1    diclofenac sodium (VOLTAREN) 1 % GEL, Apply 4 g topically 4 times daily, Disp: 350 g, Rfl: 1    tolterodine (DETROL LA) 2 MG extended release capsule, Take 1 capsule by mouth daily, Disp: 90 capsule, Rfl: 1    clobetasol (TEMOVATE) 0.05 % ointment, Apply topically daily Apply topically 2 times daily. 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

## 2023-05-25 ENCOUNTER — TELEPHONE (OUTPATIENT)
Dept: FAMILY MEDICINE CLINIC | Age: 73
End: 2023-05-25

## 2023-05-25 DIAGNOSIS — M50.90 CERVICAL DISC DISORDER: Primary | ICD-10-CM

## 2023-05-25 DIAGNOSIS — M54.2 CERVICALGIA: ICD-10-CM

## 2023-07-14 ENCOUNTER — OFFICE VISIT (OUTPATIENT)
Dept: FAMILY MEDICINE CLINIC | Age: 73
End: 2023-07-14

## 2023-07-14 VITALS
DIASTOLIC BLOOD PRESSURE: 78 MMHG | TEMPERATURE: 97.9 F | HEART RATE: 80 BPM | BODY MASS INDEX: 33.63 KG/M2 | HEIGHT: 64 IN | WEIGHT: 197 LBS | SYSTOLIC BLOOD PRESSURE: 136 MMHG | OXYGEN SATURATION: 98 %

## 2023-07-14 DIAGNOSIS — R42 LIGHTHEADEDNESS: ICD-10-CM

## 2023-07-14 DIAGNOSIS — J34.89 SINUS PRESSURE: ICD-10-CM

## 2023-07-14 DIAGNOSIS — F41.9 ANXIETY: ICD-10-CM

## 2023-07-14 DIAGNOSIS — R42 VERTIGO: Primary | ICD-10-CM

## 2023-07-14 DIAGNOSIS — R11.2 NAUSEA AND VOMITING, UNSPECIFIED VOMITING TYPE: ICD-10-CM

## 2023-07-14 RX ORDER — MECLIZINE HCL 12.5 MG/1
12.5 TABLET ORAL 2 TIMES DAILY
Qty: 15 TABLET | Refills: 0 | Status: SHIPPED | OUTPATIENT
Start: 2023-07-14 | End: 2023-07-22

## 2023-07-14 RX ORDER — ALPRAZOLAM 0.25 MG/1
TABLET ORAL
Qty: 1 TABLET | Refills: 0 | Status: SHIPPED | OUTPATIENT
Start: 2023-07-14 | End: 2023-07-14

## 2023-07-14 ASSESSMENT — ENCOUNTER SYMPTOMS
NAUSEA: 1
VOMITING: 1
SINUS PRESSURE: 1
SINUS PAIN: 0
EYES NEGATIVE: 1
SORE THROAT: 0
COUGH: 0
WHEEZING: 0
SHORTNESS OF BREATH: 0
DIARRHEA: 0
CHEST TIGHTNESS: 0
ABDOMINAL PAIN: 0

## 2023-07-15 NOTE — PROGRESS NOTES
Physical Exam  Vitals and nursing note reviewed. Constitutional:       General: She is not in acute distress. Appearance: She is well-developed. HENT:      Head: Normocephalic and atraumatic. Right Ear: Tympanic membrane, ear canal and external ear normal.      Left Ear: Tympanic membrane, ear canal and external ear normal.      Nose: Nose normal.      Mouth/Throat:      Mouth: Mucous membranes are moist.      Pharynx: Oropharynx is clear. No oropharyngeal exudate or posterior oropharyngeal erythema. Eyes:      Extraocular Movements: Extraocular movements intact. Pupils: Pupils are equal, round, and reactive to light. Cardiovascular:      Rate and Rhythm: Normal rate and regular rhythm. Heart sounds: Normal heart sounds. No murmur heard. Pulmonary:      Effort: Pulmonary effort is normal. No respiratory distress. Breath sounds: Normal breath sounds. No wheezing, rhonchi or rales. Abdominal:      General: Bowel sounds are normal.      Palpations: Abdomen is soft. Tenderness: There is no abdominal tenderness. Musculoskeletal:         General: Normal range of motion. Cervical back: Normal range of motion and neck supple. No tenderness. Lymphadenopathy:      Cervical: No cervical adenopathy. Skin:     General: Skin is warm and dry. Neurological:      General: No focal deficit present. Mental Status: She is alert and oriented to person, place, and time. Sensory: No sensory deficit. Motor: No weakness. Gait: Gait normal.      Deep Tendon Reflexes: Reflexes normal.      Comments: Patient did have a negative Maunabo-Hallpike maneuver bilaterally. Psychiatric:         Mood and Affect: Mood normal.         Behavior: Behavior normal.         Thought Content: Thought content normal.         Judgment: Judgment normal.               Assessment and Plan:  Bella Del Rosario was seen today for dizziness.     Diagnoses and all orders for this visit:    Vertigo  -     CT

## 2023-07-26 ENCOUNTER — OFFICE VISIT (OUTPATIENT)
Dept: PODIATRY | Age: 73
End: 2023-07-26
Payer: MEDICARE

## 2023-07-26 ENCOUNTER — OFFICE VISIT (OUTPATIENT)
Dept: FAMILY MEDICINE CLINIC | Age: 73
End: 2023-07-26
Payer: MEDICARE

## 2023-07-26 VITALS
OXYGEN SATURATION: 98 % | DIASTOLIC BLOOD PRESSURE: 74 MMHG | HEART RATE: 68 BPM | SYSTOLIC BLOOD PRESSURE: 130 MMHG | TEMPERATURE: 98.2 F | BODY MASS INDEX: 34.38 KG/M2 | WEIGHT: 201.4 LBS | HEIGHT: 64 IN

## 2023-07-26 VITALS — WEIGHT: 201 LBS | BODY MASS INDEX: 34.5 KG/M2 | TEMPERATURE: 96.9 F

## 2023-07-26 DIAGNOSIS — G58.8 NEUROMA DIGITAL NERVE: ICD-10-CM

## 2023-07-26 DIAGNOSIS — M79.671 PAIN IN RIGHT FOOT: ICD-10-CM

## 2023-07-26 DIAGNOSIS — R09.81 SINUS CONGESTION: ICD-10-CM

## 2023-07-26 DIAGNOSIS — M85.60 BONE CYST: Primary | ICD-10-CM

## 2023-07-26 DIAGNOSIS — R42 VERTIGO: Primary | ICD-10-CM

## 2023-07-26 PROCEDURE — 1123F ACP DISCUSS/DSCN MKR DOCD: CPT | Performed by: PODIATRIST

## 2023-07-26 PROCEDURE — 99213 OFFICE O/P EST LOW 20 MIN: CPT | Performed by: PODIATRIST

## 2023-07-26 PROCEDURE — 1123F ACP DISCUSS/DSCN MKR DOCD: CPT | Performed by: FAMILY MEDICINE

## 2023-07-26 PROCEDURE — 99213 OFFICE O/P EST LOW 20 MIN: CPT | Performed by: FAMILY MEDICINE

## 2023-07-26 PROCEDURE — 3078F DIAST BP <80 MM HG: CPT | Performed by: FAMILY MEDICINE

## 2023-07-26 PROCEDURE — 3075F SYST BP GE 130 - 139MM HG: CPT | Performed by: FAMILY MEDICINE

## 2023-07-26 RX ORDER — MECLIZINE HCL 12.5 MG/1
12.5 TABLET ORAL 3 TIMES DAILY PRN
Qty: 30 TABLET | Refills: 1 | Status: SHIPPED | OUTPATIENT
Start: 2023-07-26 | End: 2023-08-05

## 2023-07-26 RX ORDER — PREDNISONE 10 MG/1
TABLET ORAL
Qty: 30 TABLET | Refills: 0 | Status: SHIPPED | OUTPATIENT
Start: 2023-07-26

## 2023-07-26 ASSESSMENT — ENCOUNTER SYMPTOMS
EYE PAIN: 0
NAUSEA: 0
SHORTNESS OF BREATH: 0
VOMITING: 0
DIARRHEA: 0
CHEST TIGHTNESS: 0
SORE THROAT: 0
CONSTIPATION: 0
ABDOMINAL PAIN: 0
TROUBLE SWALLOWING: 0
SINUS PRESSURE: 1
WHEEZING: 0
SINUS PAIN: 0
BACK PAIN: 0
COUGH: 0

## 2023-07-26 NOTE — PROGRESS NOTES
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, with stent in left internal  carotid artery    Cerebral artery occlusion with cerebral infarction ([address])     GERD (gastroesophageal reflux disease)     Headache     Hyperlipidemia     Hypertension     Hypothyroidism     Occipital neuralgia 2022    Osteoporosis     Thyroid disease      Past Surgical History:   Procedure Laterality Date    CAROTID ENDARTERECTOMY      HEMORRHOID SURGERY      HYSTERECTOMY (CERVIX STATUS UNKNOWN)      Partial at age 30    JOINT REPLACEMENT Bilateral     knee    KNEE ARTHROPLASTY Bilateral 2010    left sided stroke afterward, residual rt side weakness    OVARY REMOVAL      at age 26     Family History   Problem Relation Age of Onset    Heart Failure Mother     Breast Cancer Maternal Grandmother     Breast Cancer Maternal Aunt      Social History     Socioeconomic History    Marital status:      Spouse name: Not on file    Number of children: Not on file    Years of education: Not on file    Highest education level: Not on file   Occupational History    Not on file   Tobacco Use    Smoking status: Former     Packs/day: 1.00     Years: 10.00     Pack years: 10.00     Types: Cigarettes     Start date: 1971     Quit date: 1981     Years since quittin.5     Passive exposure: Past    Smokeless tobacco: Never   Vaping Use    Vaping Use: Never used   Substance and Sexual Activity    Alcohol use:  Yes     Alcohol/week: 1.0 standard drink     Types: 1 Glasses of wine per week     Comment: occas g;ass of wine    Drug use: No    Sexual activity: Not Currently     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

## 2023-07-26 NOTE — PROGRESS NOTES
7/26/23    Name: Shannon Deluna  YRM:42/[id number]   Sex:female   Age:72 y.o. Chief Complaint   Patient presents with    Dizziness    Pressure Behind the Eyes     Patient presents to office for visit. She [facility] through [facility]igo and given Antivert for one week. Patient says her symptoms started as a fullness or pressure in her head, lightheadedness, and noise in her ears. She then became so dizzy that she was vomiting. Patient did go to ENT, Dr. Marybeth Junior, and was advised that her sinuses and ears were fine. She was not given any medications. [facility] went to [facility] after [facility], took Meclizine twice a day for a week and the vertigo is improved. She continues to have pressure in her head. Here for follow up on vertigo  Still has it when she does her floor exercises  The antivert did help to some degreed  She had the dizziness for a week and saw ENT  Her felt everything was okay  Then a week later she came through [facility]  Was given the anitvert and CT head ordered    Things have changed a little now she has a little more pressure in face, nasal sinuses, cheek sinus area and for head  Not really a band around her whole head  And this feels different then her other headaches    Nasal congestion and dryness. Feel slike she has to blow but nothing comes out  Using flonase for the last week daily  Also some sinus stuff for dry nose    No longer having the nausea or vomiting  Will await the CT head  But also try prednisone in the mean time    Review of Systems   Constitutional:  Negative for appetite change, fatigue and fever. HENT:  Positive for sinus pressure. Negative for congestion, ear pain, sinus pain, sore throat and trouble swallowing. Eyes:  Negative for pain. Respiratory:  Negative for cough, chest tightness, shortness of breath and wheezing. Cardiovascular:  Negative for chest pain, palpitations and leg swelling.    Gastrointestinal:  Negative for abdominal pain, constipation,

## 2023-08-14 ENCOUNTER — TELEPHONE (OUTPATIENT)
Dept: PODIATRY | Age: 73
End: 2023-08-14

## 2023-08-14 DIAGNOSIS — F41.8 ANTICIPATORY ANXIETY: Primary | ICD-10-CM

## 2023-08-14 RX ORDER — ALPRAZOLAM 0.25 MG/1
TABLET ORAL
Qty: 2 TABLET | Refills: 0 | Status: SHIPPED | OUTPATIENT
Start: 2023-08-14 | End: 2023-08-18

## 2023-08-14 NOTE — TELEPHONE ENCOUNTER
Pt is having an MRI done this Friday  She is really worried about getting claustrophobic in the MRI machine  And nervous about the test  Can u send something to david rizzo in [facility] to relax her before the mri

## 2023-08-16 ENCOUNTER — HOSPITAL ENCOUNTER (OUTPATIENT)
Dept: CT IMAGING | Age: 73
Discharge: HOME | End: 2023-08-18
Payer: MEDICARE

## 2023-08-16 ENCOUNTER — TELEPHONE (OUTPATIENT)
Dept: FAMILY MEDICINE CLINIC | Age: 73
End: 2023-08-16

## 2023-08-16 DIAGNOSIS — R42 VERTIGO: ICD-10-CM

## 2023-08-16 DIAGNOSIS — J34.89 SINUS PRESSURE: ICD-10-CM

## 2023-08-16 DIAGNOSIS — R42 LIGHTHEADEDNESS: ICD-10-CM

## 2023-08-16 PROCEDURE — 70450 CT HEAD/BRAIN W/O DYE: CPT

## 2023-08-16 NOTE — TELEPHONE ENCOUNTER
I ordered this CAT scan a month ago and she is just getting it now???? Tell her there were no acute findings and follow-up with PCP.

## 2023-08-17 NOTE — TELEPHONE ENCOUNTER
Darci Dick called back and stated that she was given their first available. She scheduled a follow up with Dr Sudhir Sanabria next week to discuss this and her treatment options.

## 2023-08-18 ENCOUNTER — HOSPITAL ENCOUNTER (OUTPATIENT)
Dept: MRI IMAGING | Age: 73
End: 2023-08-18
Attending: PODIATRIST
Payer: MEDICARE

## 2023-08-18 DIAGNOSIS — G58.8 NEUROMA DIGITAL NERVE: ICD-10-CM

## 2023-08-18 DIAGNOSIS — M79.671 PAIN IN RIGHT FOOT: ICD-10-CM

## 2023-08-18 DIAGNOSIS — M85.60 BONE CYST: ICD-10-CM

## 2023-08-18 PROCEDURE — 73718 MRI LOWER EXTREMITY W/O DYE: CPT

## 2023-08-21 ENCOUNTER — OFFICE VISIT (OUTPATIENT)
Dept: PODIATRY | Age: 73
End: 2023-08-21
Payer: MEDICARE

## 2023-08-21 VITALS
SYSTOLIC BLOOD PRESSURE: 136 MMHG | DIASTOLIC BLOOD PRESSURE: 74 MMHG | BODY MASS INDEX: 36.05 KG/M2 | WEIGHT: 210 LBS | TEMPERATURE: 97.3 F

## 2023-08-21 DIAGNOSIS — M85.60 BONE CYST: ICD-10-CM

## 2023-08-21 DIAGNOSIS — G58.8 NEUROMA DIGITAL NERVE: Primary | ICD-10-CM

## 2023-08-21 DIAGNOSIS — M79.671 PAIN IN RIGHT FOOT: ICD-10-CM

## 2023-08-21 PROCEDURE — 1123F ACP DISCUSS/DSCN MKR DOCD: CPT | Performed by: PODIATRIST

## 2023-08-21 PROCEDURE — 3078F DIAST BP <80 MM HG: CPT | Performed by: PODIATRIST

## 2023-08-21 PROCEDURE — 99213 OFFICE O/P EST LOW 20 MIN: CPT | Performed by: PODIATRIST

## 2023-08-21 PROCEDURE — 3075F SYST BP GE 130 - 139MM HG: CPT | Performed by: PODIATRIST

## 2023-08-21 NOTE — PROGRESS NOTES
WITH TOMOSYNTHESIS, 8/1/2023 TECHNIQUE: Screening mammography of the bilateral breasts was performed with tomosynthesis. 2D standard and 3D tomosynthesis combination imaging performed through both breasts in the MLO and CC projection. Computer aided detection was utilized in the interpretation of this exam. COMPARISON: 07/29/2022 and 07/19/2021 HISTORY: Screening. FINDINGS: There are scattered areas of fibroglandular density. There is no new dominant mass, suspicious microcalcification, or area of architectural distortion. No mammographic evidence of malignancy. Annual screening mammography is recommended BIRADS: BIRADS - CATEGORY 1 Negative. OVERALL ASSESSMENT - NEGATIVE A letter of notification will be sent to the patient regarding the results. RISK ASSESSMENT: During this patient's visit, information obtained was used to generate a lifetime risk assessment using the Tyrer-Cuzick model (also called the [employer] International Breast Cancer Intervention study Breast Cancer Risk Evaluation Tool). LIFETIME RISK: Patient has a Tyrer-Cuzick score of: 4.4% BREAST TISSUE DENSITY Type 2 density AVERAGE RISK ( < 15% Lifetime Risk) Yearly screening mammograms starting at age 36 and older. Regardless of breast density, tomosynthesis is suggested. Monthly self-breast exams are recommended for women age 27 and older. Note: Mammography is not 100% accurate in the detection of breast cancer. Accuracy decreases as mammographic density of the breast increases. A negative mammogram should not deter further evaluation (including biopsy) of suspicious physical findings. Recommendations are based on NCCN ([employer]) and ACR [employer] of Radiology) guidelines. Thank you for sending your patient to this ACR and FDA approved facility. If there are physician concerns regarding this report, a Radiologist can be reached by calling the following number [phone].        Assessment and Plan: pt to

## 2023-08-24 ENCOUNTER — OFFICE VISIT (OUTPATIENT)
Dept: FAMILY MEDICINE CLINIC | Age: 73
End: 2023-08-24
Payer: MEDICARE

## 2023-08-24 VITALS
DIASTOLIC BLOOD PRESSURE: 72 MMHG | OXYGEN SATURATION: 97 % | TEMPERATURE: 97.9 F | SYSTOLIC BLOOD PRESSURE: 112 MMHG | WEIGHT: 208 LBS | HEART RATE: 78 BPM | HEIGHT: 64 IN | BODY MASS INDEX: 35.51 KG/M2

## 2023-08-24 DIAGNOSIS — J30.89 NON-SEASONAL ALLERGIC RHINITIS, UNSPECIFIED TRIGGER: Primary | ICD-10-CM

## 2023-08-24 PROCEDURE — 3074F SYST BP LT 130 MM HG: CPT | Performed by: FAMILY MEDICINE

## 2023-08-24 PROCEDURE — 99213 OFFICE O/P EST LOW 20 MIN: CPT | Performed by: FAMILY MEDICINE

## 2023-08-24 PROCEDURE — 3078F DIAST BP <80 MM HG: CPT | Performed by: FAMILY MEDICINE

## 2023-08-24 PROCEDURE — 1123F ACP DISCUSS/DSCN MKR DOCD: CPT | Performed by: FAMILY MEDICINE

## 2023-08-24 ASSESSMENT — ENCOUNTER SYMPTOMS
DIARRHEA: 0
SORE THROAT: 0
SINUS PRESSURE: 1
SHORTNESS OF BREATH: 0
VOMITING: 0
BACK PAIN: 0
EYE PAIN: 0
CONSTIPATION: 0
SINUS PAIN: 0
ABDOMINAL PAIN: 0
NAUSEA: 0
WHEEZING: 0
TROUBLE SWALLOWING: 0
COUGH: 0
CHEST TIGHTNESS: 0

## 2023-09-29 ENCOUNTER — APPOINTMENT (OUTPATIENT)
Dept: CT IMAGING | Age: 73
End: 2023-09-29
Payer: MEDICARE

## 2023-09-29 ENCOUNTER — HOSPITAL ENCOUNTER (EMERGENCY)
Age: 73
Discharge: HOME | End: 2023-09-29
Attending: EMERGENCY MEDICINE
Payer: MEDICARE

## 2023-09-29 VITALS
TEMPERATURE: 97.8 F | RESPIRATION RATE: 14 BRPM | OXYGEN SATURATION: 92 % | HEIGHT: 64 IN | SYSTOLIC BLOOD PRESSURE: 115 MMHG | HEART RATE: 62 BPM | WEIGHT: 196 LBS | DIASTOLIC BLOOD PRESSURE: 64 MMHG | BODY MASS INDEX: 33.46 KG/M2

## 2023-09-29 DIAGNOSIS — H81.399 PERIPHERAL VERTIGO, UNSPECIFIED LATERALITY: Primary | ICD-10-CM

## 2023-09-29 DIAGNOSIS — R42 DIZZINESS: ICD-10-CM

## 2023-09-29 DIAGNOSIS — R07.9 CHEST PAIN, UNSPECIFIED TYPE: ICD-10-CM

## 2023-09-29 LAB
ALBUMIN SERPL-MCNC: 4 G/DL (ref 3.5–5.2)
ALP SERPL-CCNC: 106 U/L (ref 35–104)
ALT SERPL-CCNC: 13 U/L (ref 0–32)
ANION GAP SERPL CALCULATED.3IONS-SCNC: 13 MMOL/L (ref 7–16)
AST SERPL-CCNC: 16 U/L (ref 0–31)
BASOPHILS # BLD: 0.01 K/UL (ref 0–0.2)
BASOPHILS NFR BLD: 0 % (ref 0–2)
BILIRUB SERPL-MCNC: 0.4 MG/DL (ref 0–1.2)
BUN SERPL-MCNC: 18 MG/DL (ref 6–23)
CALCIUM SERPL-MCNC: 9.4 MG/DL (ref 8.6–10.2)
CHLORIDE SERPL-SCNC: 104 MMOL/L (ref 98–107)
CO2 SERPL-SCNC: 25 MMOL/L (ref 22–29)
CREAT SERPL-MCNC: 0.9 MG/DL (ref 0.5–1)
EKG ATRIAL RATE: 56 BPM
EKG P AXIS: 4 DEGREES
EKG P-R INTERVAL: 150 MS
EKG Q-T INTERVAL: 476 MS
EKG QRS DURATION: 82 MS
EKG QTC CALCULATION (BAZETT): 459 MS
EKG R AXIS: -4 DEGREES
EKG T AXIS: -8 DEGREES
EKG VENTRICULAR RATE: 56 BPM
EOSINOPHIL # BLD: 0.05 K/UL (ref 0.05–0.5)
EOSINOPHILS RELATIVE PERCENT: 1 % (ref 0–6)
ERYTHROCYTE [DISTWIDTH] IN BLOOD BY AUTOMATED COUNT: 12.7 % (ref 11.5–15)
GFR SERPL CREATININE-BSD FRML MDRD: >60 ML/MIN/1.73M2
GLUCOSE SERPL-MCNC: 118 MG/DL (ref 74–99)
HCT VFR BLD AUTO: 39.8 % (ref 34–48)
HGB BLD-MCNC: 13.3 G/DL (ref 11.5–15.5)
IMM GRANULOCYTES # BLD AUTO: <0.03 K/UL (ref 0–0.58)
IMM GRANULOCYTES NFR BLD: 0 % (ref 0–5)
LYMPHOCYTES NFR BLD: 0.97 K/UL (ref 1.5–4)
LYMPHOCYTES RELATIVE PERCENT: 16 % (ref 20–42)
MCH RBC QN AUTO: 29.9 PG (ref 26–35)
MCHC RBC AUTO-ENTMCNC: 33.4 G/DL (ref 32–34.5)
MCV RBC AUTO: 89.4 FL (ref 80–99.9)
MONOCYTES NFR BLD: 0.29 K/UL (ref 0.1–0.95)
MONOCYTES NFR BLD: 5 % (ref 2–12)
NEUTROPHILS NFR BLD: 78 % (ref 43–80)
NEUTS SEG NFR BLD: 4.75 K/UL (ref 1.8–7.3)
PLATELET # BLD AUTO: 212 K/UL (ref 130–450)
PMV BLD AUTO: 10.1 FL (ref 7–12)
POTASSIUM SERPL-SCNC: 3.7 MMOL/L (ref 3.5–5)
PROT SERPL-MCNC: 6.5 G/DL (ref 6.4–8.3)
RBC # BLD AUTO: 4.45 M/UL (ref 3.5–5.5)
SODIUM SERPL-SCNC: 142 MMOL/L (ref 132–146)
TROPONIN I SERPL HS-MCNC: 8 NG/L (ref 0–9)
WBC OTHER # BLD: 6.1 K/UL (ref 4.5–11.5)

## 2023-09-29 PROCEDURE — 93005 ELECTROCARDIOGRAM TRACING: CPT

## 2023-09-29 PROCEDURE — 70496 CT ANGIOGRAPHY HEAD: CPT

## 2023-09-29 PROCEDURE — 6360000004 HC RX CONTRAST MEDICATION: Performed by: RADIOLOGY

## 2023-09-29 PROCEDURE — 80053 COMPREHEN METABOLIC PANEL: CPT

## 2023-09-29 PROCEDURE — 6370000000 HC RX 637 (ALT 250 FOR IP): Performed by: EMERGENCY MEDICINE

## 2023-09-29 PROCEDURE — 84484 ASSAY OF TROPONIN QUANT: CPT

## 2023-09-29 PROCEDURE — 99285 EMERGENCY DEPT VISIT HI MDM: CPT

## 2023-09-29 PROCEDURE — 2580000003 HC RX 258: Performed by: EMERGENCY MEDICINE

## 2023-09-29 PROCEDURE — 70498 CT ANGIOGRAPHY NECK: CPT

## 2023-09-29 PROCEDURE — 6360000002 HC RX W HCPCS

## 2023-09-29 PROCEDURE — 6370000000 HC RX 637 (ALT 250 FOR IP)

## 2023-09-29 PROCEDURE — 93010 ELECTROCARDIOGRAM REPORT: CPT | Performed by: INTERNAL MEDICINE

## 2023-09-29 PROCEDURE — 85025 COMPLETE CBC W/AUTO DIFF WBC: CPT

## 2023-09-29 PROCEDURE — 96374 THER/PROPH/DIAG INJ IV PUSH: CPT

## 2023-09-29 PROCEDURE — 99284 EMERGENCY DEPT VISIT MOD MDM: CPT | Performed by: STUDENT IN AN ORGANIZED HEALTH CARE EDUCATION/TRAINING PROGRAM

## 2023-09-29 RX ORDER — ONDANSETRON 4 MG/1
4 TABLET, ORALLY DISINTEGRATING ORAL ONCE
Status: COMPLETED | OUTPATIENT
Start: 2023-09-29 | End: 2023-09-29

## 2023-09-29 RX ORDER — 0.9 % SODIUM CHLORIDE 0.9 %
1000 INTRAVENOUS SOLUTION INTRAVENOUS ONCE
Status: COMPLETED | OUTPATIENT
Start: 2023-09-29 | End: 2023-09-29

## 2023-09-29 RX ORDER — MECLIZINE HCL 12.5 MG/1
25 TABLET ORAL ONCE
Status: COMPLETED | OUTPATIENT
Start: 2023-09-29 | End: 2023-09-29

## 2023-09-29 RX ORDER — ONDANSETRON 2 MG/ML
4 INJECTION INTRAMUSCULAR; INTRAVENOUS ONCE
Status: COMPLETED | OUTPATIENT
Start: 2023-09-29 | End: 2023-09-29

## 2023-09-29 RX ADMIN — ONDANSETRON 4 MG: 2 INJECTION INTRAMUSCULAR; INTRAVENOUS at 19:43

## 2023-09-29 RX ADMIN — SODIUM CHLORIDE 1000 ML: 9 INJECTION, SOLUTION INTRAVENOUS at 20:42

## 2023-09-29 RX ADMIN — ONDANSETRON 4 MG: 4 TABLET, ORALLY DISINTEGRATING ORAL at 21:21

## 2023-09-29 RX ADMIN — MECLIZINE 25 MG: 12.5 TABLET ORAL at 20:39

## 2023-09-29 RX ADMIN — IOPAMIDOL 75 ML: 755 INJECTION, SOLUTION INTRAVENOUS at 19:49

## 2023-09-29 ASSESSMENT — ENCOUNTER SYMPTOMS
NAUSEA: 0
DIARRHEA: 0
SHORTNESS OF BREATH: 0
CONSTIPATION: 0
ABDOMINAL PAIN: 0
VOMITING: 0

## 2023-09-29 ASSESSMENT — LIFESTYLE VARIABLES
HOW MANY STANDARD DRINKS CONTAINING ALCOHOL DO YOU HAVE ON A TYPICAL DAY: 1 OR 2
HOW OFTEN DO YOU HAVE A DRINK CONTAINING ALCOHOL: 4 OR MORE TIMES A WEEK

## 2023-09-29 NOTE — ED PROVIDER NOTES
Department of Emergency Medicine   ED  Provider Note    Pt Name: Abi Dougherty         MRN: [id number]         [address] [facility] 1950    Admit Date/RoomTime: 9/29/2023  6:14 PM  ED Room: 03/03      Chief Complaint   Patient presents with    Dizziness    Chest Pain        HPI     Abi Dougherty is a 74 y.o. 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 Patient reports that this morning when she woke up her chest hurt and she \"wasn't feeling good. \"  She reports chest pain that starts on her left chest and radiates into her right and back. At approximately 5 PM, the patient was cleaning a cabinet at home when she was struck with vertigo. She experienced vertigo 1 month ago so she took meclizine at home without relief. She reports that this episode of vertigo is worse and more intense. She reports nausea and vomiting. Denies abdominal pain, constipation, or diarrhea. She called EMS. Per EMS, the patient had 3 sinus bradycardic EKGs and received Zofran x1 with blood pressure consistently in the 150s/70s. The patient is uncertain if this episode feels similar to when she had her stroke. She does not remember much from her stroke. She denies focal weaknesses. She reports that she has had blurred vision for several hours. Suspect ACS vs stroke due to dizziness, blurred vision, chest pain. Review of Systems   Constitutional:  Negative for chills and fever. Eyes:  Positive for visual disturbance. Respiratory:  Negative for shortness of breath. Cardiovascular:  Positive for chest pain. Negative for leg swelling. Gastrointestinal:  Negative for abdominal pain, constipation, diarrhea, nausea and vomiting. Neurological:  Positive for dizziness.  Negative for facial asymmetry, speech

## 2023-09-29 NOTE — ED NOTES
Name: Manish Pelayo  : 1950  MRN: [id number]    Date: 2023    Benefits of immediately proceeding with Radiology exam outweigh the risks and therefore the following is being waived:      [] Pregnancy test    [] Protocol for Iodine allergy    [] MRI questionnaire    [x] BUN/Creatinine        DO Danae Dickson DO  23

## 2023-09-30 NOTE — ED NOTES
Patient assisted to bathroom. Patient became dizzy and vomited. Resident notified.  Liang Thornton RN  09/29/23 212

## 2023-09-30 NOTE — PROGRESS NOTES
@0188 access center notified of Stroke Alert  Vertigo/ blurred vision  NIH 0  LKW 1700  @2025 Dr. Mariano Bella spoke with Dr. Vinicio Marcial telestroke neurologist

## 2023-10-01 ASSESSMENT — ENCOUNTER SYMPTOMS
NAUSEA: 0
ABDOMINAL PAIN: 0
CONSTIPATION: 0
DIARRHEA: 0
VOMITING: 0
SHORTNESS OF BREATH: 0

## 2023-10-21 SDOH — ECONOMIC STABILITY: FOOD INSECURITY: WITHIN THE PAST 12 MONTHS, YOU WORRIED THAT YOUR FOOD WOULD RUN OUT BEFORE YOU GOT MONEY TO BUY MORE.: NEVER TRUE

## 2023-10-21 SDOH — ECONOMIC STABILITY: INCOME INSECURITY: HOW HARD IS IT FOR YOU TO PAY FOR THE VERY BASICS LIKE FOOD, HOUSING, MEDICAL CARE, AND HEATING?: NOT HARD AT ALL

## 2023-10-21 SDOH — ECONOMIC STABILITY: TRANSPORTATION INSECURITY
IN THE PAST 12 MONTHS, HAS LACK OF TRANSPORTATION KEPT YOU FROM MEETINGS, WORK, OR FROM GETTING THINGS NEEDED FOR DAILY LIVING?: NO

## 2023-10-21 SDOH — ECONOMIC STABILITY: FOOD INSECURITY: WITHIN THE PAST 12 MONTHS, THE FOOD YOU BOUGHT JUST DIDN'T LAST AND YOU DIDN'T HAVE MONEY TO GET MORE.: NEVER TRUE

## 2023-10-21 SDOH — ECONOMIC STABILITY: HOUSING INSECURITY
IN THE LAST 12 MONTHS, WAS THERE A TIME WHEN YOU DID NOT HAVE A STEADY PLACE TO SLEEP OR SLEPT IN A SHELTER (INCLUDING NOW)?: NO

## 2023-10-21 ASSESSMENT — PATIENT HEALTH QUESTIONNAIRE - PHQ9
SUM OF ALL RESPONSES TO PHQ QUESTIONS 1-9: 0
SUM OF ALL RESPONSES TO PHQ QUESTIONS 1-9: 0
SUM OF ALL RESPONSES TO PHQ9 QUESTIONS 1 & 2: 0
SUM OF ALL RESPONSES TO PHQ9 QUESTIONS 1 & 2: 0
2. FEELING DOWN, DEPRESSED OR HOPELESS: NOT AT ALL
SUM OF ALL RESPONSES TO PHQ QUESTIONS 1-9: 0
2. FEELING DOWN, DEPRESSED OR HOPELESS: 0
SUM OF ALL RESPONSES TO PHQ QUESTIONS 1-9: 0
1. LITTLE INTEREST OR PLEASURE IN DOING THINGS: NOT AT ALL
1. LITTLE INTEREST OR PLEASURE IN DOING THINGS: 0

## 2023-10-23 ENCOUNTER — OFFICE VISIT (OUTPATIENT)
Dept: FAMILY MEDICINE CLINIC | Age: 73
End: 2023-10-23
Payer: MEDICARE

## 2023-10-23 VITALS
BODY MASS INDEX: 35.48 KG/M2 | HEART RATE: 76 BPM | WEIGHT: 207.8 LBS | HEIGHT: 64 IN | DIASTOLIC BLOOD PRESSURE: 84 MMHG | TEMPERATURE: 98.5 F | OXYGEN SATURATION: 95 % | SYSTOLIC BLOOD PRESSURE: 138 MMHG

## 2023-10-23 DIAGNOSIS — E03.9 ACQUIRED HYPOTHYROIDISM: ICD-10-CM

## 2023-10-23 DIAGNOSIS — R42 VERTIGO: ICD-10-CM

## 2023-10-23 DIAGNOSIS — K21.9 GASTROESOPHAGEAL REFLUX DISEASE WITHOUT ESOPHAGITIS: ICD-10-CM

## 2023-10-23 DIAGNOSIS — E04.0 GOITER DIFFUSE: ICD-10-CM

## 2023-10-23 DIAGNOSIS — I10 ESSENTIAL HYPERTENSION: Primary | ICD-10-CM

## 2023-10-23 DIAGNOSIS — E78.2 MIXED HYPERLIPIDEMIA: ICD-10-CM

## 2023-10-23 PROCEDURE — 99214 OFFICE O/P EST MOD 30 MIN: CPT | Performed by: FAMILY MEDICINE

## 2023-10-23 PROCEDURE — 3079F DIAST BP 80-89 MM HG: CPT | Performed by: FAMILY MEDICINE

## 2023-10-23 PROCEDURE — 3075F SYST BP GE 130 - 139MM HG: CPT | Performed by: FAMILY MEDICINE

## 2023-10-23 PROCEDURE — 1123F ACP DISCUSS/DSCN MKR DOCD: CPT | Performed by: FAMILY MEDICINE

## 2023-10-23 RX ORDER — MECLIZINE HCL 12.5 MG/1
TABLET ORAL
COMMUNITY
Start: 2023-09-30 | End: 2023-10-23 | Stop reason: SDUPTHER

## 2023-10-23 RX ORDER — PANTOPRAZOLE SODIUM 40 MG/1
40 TABLET, DELAYED RELEASE ORAL DAILY
Qty: 90 TABLET | Refills: 2 | Status: SHIPPED | OUTPATIENT
Start: 2023-10-23

## 2023-10-23 RX ORDER — LEVOTHYROXINE SODIUM 0.07 MG/1
75 TABLET ORAL DAILY
Qty: 90 TABLET | Refills: 2 | Status: SHIPPED | OUTPATIENT
Start: 2023-10-23

## 2023-10-23 RX ORDER — MECLIZINE HCL 12.5 MG/1
TABLET ORAL
Qty: 90 TABLET | Refills: 0 | Status: SHIPPED | OUTPATIENT
Start: 2023-10-23

## 2023-10-23 RX ORDER — VERAPAMIL HYDROCHLORIDE 120 MG/1
120 TABLET, FILM COATED ORAL 2 TIMES DAILY
Qty: 180 TABLET | Refills: 2 | Status: SHIPPED | OUTPATIENT
Start: 2023-10-23

## 2023-10-23 RX ORDER — ROSUVASTATIN CALCIUM 20 MG/1
TABLET, COATED ORAL
Qty: 24 TABLET | Refills: 2 | Status: SHIPPED | OUTPATIENT
Start: 2023-10-23

## 2023-10-23 ASSESSMENT — ENCOUNTER SYMPTOMS
SORE THROAT: 0
TROUBLE SWALLOWING: 0
EYE PAIN: 0
ABDOMINAL PAIN: 0
WHEEZING: 0
NAUSEA: 0
CHEST TIGHTNESS: 0
SINUS PAIN: 0
DIARRHEA: 0
VOMITING: 0
CONSTIPATION: 0
BACK PAIN: 0
COUGH: 0
SHORTNESS OF BREATH: 0

## 2023-10-23 NOTE — PROGRESS NOTES
Hyperlipidemia     Hypertension     Hypothyroidism     Obesity     Occipital neuralgia 04/12/2022    Osteoarthritis     Osteoporosis     Thyroid disease      Patient Active Problem List    Diagnosis Date Noted    Chronic pain syndrome 05/18/2022    Cervical disc disorder 05/18/2022    Cervicalgia 05/18/2022    Myalgia 05/18/2022    Osteopenia after menopause 04/26/2022    Occipital neuralgia 04/12/2022    Poison ivy dermatitis 07/06/2021    Vitamin D deficiency 03/19/2020    Hyperlipidemia 09/25/2019    Recurrent occipital headache 08/06/2019    Hypothyroidism 08/06/2019    GERD (gastroesophageal reflux disease) 08/06/2019    Peroneal tendinitis of right lower extremity 05/30/2019    Pain in right foot 05/14/2019    Essential hypertension 11/14/2016    Cerebral aneurysm, nonruptured 05/21/2009      Past Surgical History:   Procedure Laterality Date    APPENDECTOMY      CAROTID ENDARTERECTOMY      HEMORRHOID SURGERY      HYSTERECTOMY (CERVIX STATUS UNKNOWN)      Partial at age 30    HYSTERECTOMY, TOTAL ABDOMINAL (CERVIX REMOVED)      JOINT REPLACEMENT Bilateral     knee    KNEE ARTHROPLASTY Bilateral 2010    left sided stroke afterward, residual rt side weakness    OVARY REMOVAL      at age 26    TONSILLECTOMY        Social History       Tobacco History       Smoking Status  Former Smoking Start Date  1/1/1971 Quit Date  1/1/1981 Smoking Frequency  1 pack/day for 10.00 years (10.00 ttl pk-yrs)    Smoking Tobacco Type  Cigarettes from 1/1/1971 to 1/1/1981      Passive Exposure  Past      Smokeless Tobacco Use  Never              Alcohol History       Alcohol Use Status  Yes Drinks/Week  1 Glasses of wine, 0 Cans of beer, 0 Shots of liquor, 0 Drinks containing 0.5 oz of alcohol per week Amount  1.0 standard drink of alcohol/wk Comment  occas g;ass of wine              Drug Use       Drug Use Status  No              Sexual Activity       Sexually Active  Not Currently Partners  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

## 2023-11-01 ENCOUNTER — NURSE ONLY (OUTPATIENT)
Dept: FAMILY MEDICINE CLINIC | Age: 73
End: 2023-11-01
Payer: MEDICARE

## 2023-11-01 DIAGNOSIS — Z23 NEED FOR INFLUENZA VACCINATION: Primary | ICD-10-CM

## 2023-11-01 PROCEDURE — 90694 VACC AIIV4 NO PRSRV 0.5ML IM: CPT | Performed by: FAMILY MEDICINE

## 2023-11-01 PROCEDURE — G0008 ADMIN INFLUENZA VIRUS VAC: HCPCS | Performed by: FAMILY MEDICINE

## 2023-11-01 PROCEDURE — 99999 PR OFFICE/OUTPT VISIT,PROCEDURE ONLY: CPT | Performed by: FAMILY MEDICINE

## 2024-04-18 ENCOUNTER — TELEPHONE (OUTPATIENT)
Dept: FAMILY MEDICINE CLINIC | Age: 74
End: 2024-04-18

## 2024-04-18 DIAGNOSIS — R73.01 IMPAIRED FASTING BLOOD SUGAR: ICD-10-CM

## 2024-04-18 DIAGNOSIS — E78.2 MIXED HYPERLIPIDEMIA: Primary | ICD-10-CM

## 2024-04-18 DIAGNOSIS — E03.9 ACQUIRED HYPOTHYROIDISM: ICD-10-CM

## 2024-04-18 DIAGNOSIS — I10 ESSENTIAL HYPERTENSION: ICD-10-CM

## 2024-04-22 DIAGNOSIS — E78.2 MIXED HYPERLIPIDEMIA: ICD-10-CM

## 2024-04-22 DIAGNOSIS — K21.9 GASTROESOPHAGEAL REFLUX DISEASE WITHOUT ESOPHAGITIS: ICD-10-CM

## 2024-04-22 RX ORDER — PANTOPRAZOLE SODIUM 40 MG/1
40 TABLET, DELAYED RELEASE ORAL DAILY
Qty: 90 TABLET | Refills: 2 | Status: CANCELLED | OUTPATIENT
Start: 2024-04-22

## 2024-04-22 RX ORDER — ROSUVASTATIN CALCIUM 20 MG/1
TABLET, COATED ORAL
Qty: 24 TABLET | Refills: 2 | Status: CANCELLED | OUTPATIENT
Start: 2024-04-22

## 2024-04-22 NOTE — TELEPHONE ENCOUNTER
[facility] calling for news scripts for 2 of her meds.    She needs Rosuvastatin & Pantoprazole sent to [email or web address].       Last Appointment:  10/23/2023  Future Appointments   Date Time Provider Department Center   5/6/2024 11:30 AM Lucy Boyce DO [facility]

## 2024-05-01 DIAGNOSIS — E78.2 MIXED HYPERLIPIDEMIA: ICD-10-CM

## 2024-05-01 DIAGNOSIS — E03.9 ACQUIRED HYPOTHYROIDISM: ICD-10-CM

## 2024-05-01 DIAGNOSIS — R73.01 IMPAIRED FASTING BLOOD SUGAR: ICD-10-CM

## 2024-05-01 DIAGNOSIS — I10 ESSENTIAL HYPERTENSION: ICD-10-CM

## 2024-05-01 LAB
ALBUMIN: 4.5 G/DL (ref 3.5–5.2)
ALP BLD-CCNC: 96 U/L (ref 35–104)
ALT SERPL-CCNC: 12 U/L (ref 0–32)
ANION GAP SERPL CALCULATED.3IONS-SCNC: 16 MMOL/L (ref 7–16)
AST SERPL-CCNC: 19 U/L (ref 0–31)
BILIRUB SERPL-MCNC: 0.4 MG/DL (ref 0–1.2)
BUN BLDV-MCNC: 15 MG/DL (ref 6–23)
CALCIUM SERPL-MCNC: 9.6 MG/DL (ref 8.6–10.2)
CHLORIDE BLD-SCNC: 107 MMOL/L (ref 98–107)
CHOLESTEROL, TOTAL: 138 MG/DL
CO2: 21 MMOL/L (ref 22–29)
CREAT SERPL-MCNC: 0.7 MG/DL (ref 0.5–1)
CREATININE URINE: 151.1 MG/DL (ref 29–226)
GFR, ESTIMATED: 87 ML/MIN/1.73M2
GLUCOSE BLD-MCNC: 105 MG/DL (ref 74–99)
HBA1C MFR BLD: 5.2 % (ref 4–5.6)
HDLC SERPL-MCNC: 56 MG/DL
LDL CHOLESTEROL: 69 MG/DL
MICROALBUMIN/CREAT 24H UR: <12 MG/L (ref 0–19)
MICROALBUMIN/CREAT UR-RTO: NORMAL MCG/MG CREAT (ref 0–30)
POTASSIUM SERPL-SCNC: 4.4 MMOL/L (ref 3.5–5)
SODIUM BLD-SCNC: 144 MMOL/L (ref 132–146)
TOTAL PROTEIN: 6.5 G/DL (ref 6.4–8.3)
TRIGL SERPL-MCNC: 63 MG/DL
TSH SERPL DL<=0.05 MIU/L-ACNC: 2.21 UIU/ML (ref 0.27–4.2)
VLDLC SERPL CALC-MCNC: 13 MG/DL

## 2024-05-05 SDOH — HEALTH STABILITY: PHYSICAL HEALTH: ON AVERAGE, HOW MANY DAYS PER WEEK DO YOU ENGAGE IN MODERATE TO STRENUOUS EXERCISE (LIKE A BRISK WALK)?: 5 DAYS

## 2024-05-05 SDOH — HEALTH STABILITY: PHYSICAL HEALTH: ON AVERAGE, HOW MANY MINUTES DO YOU ENGAGE IN EXERCISE AT THIS LEVEL?: 120 MIN

## 2024-05-05 ASSESSMENT — LIFESTYLE VARIABLES
HOW OFTEN DO YOU HAVE A DRINK CONTAINING ALCOHOL: 5
HAVE YOU OR SOMEONE ELSE BEEN INJURED AS A RESULT OF YOUR DRINKING: NO
HAS A RELATIVE, FRIEND, DOCTOR, OR ANOTHER HEALTH PROFESSIONAL EXPRESSED CONCERN ABOUT YOUR DRINKING OR SUGGESTED YOU CUT DOWN: NO
HAVE YOU OR SOMEONE ELSE BEEN INJURED AS A RESULT OF YOUR DRINKING: NO
HAS A RELATIVE, FRIEND, DOCTOR, OR ANOTHER HEALTH PROFESSIONAL EXPRESSED CONCERN ABOUT YOUR DRINKING OR SUGGESTED YOU CUT DOWN: NO
HOW OFTEN DURING THE LAST YEAR HAVE YOU BEEN UNABLE TO REMEMBER WHAT HAPPENED THE NIGHT BEFORE BECAUSE YOU HAD BEEN DRINKING: NEVER
HOW OFTEN DURING THE LAST YEAR HAVE YOU NEEDED AN ALCOHOLIC DRINK FIRST THING IN THE MORNING TO GET YOURSELF GOING AFTER A NIGHT OF HEAVY DRINKING: NEVER
HOW OFTEN DURING THE LAST YEAR HAVE YOU FAILED TO DO WHAT WAS NORMALLY EXPECTED FROM YOU BECAUSE OF DRINKING: NEVER
HOW MANY STANDARD DRINKS CONTAINING ALCOHOL DO YOU HAVE ON A TYPICAL DAY: 1
HOW OFTEN DURING THE LAST YEAR HAVE YOU HAD A FEELING OF GUILT OR REMORSE AFTER DRINKING: NEVER
HOW OFTEN DURING THE LAST YEAR HAVE YOU BEEN UNABLE TO REMEMBER WHAT HAPPENED THE NIGHT BEFORE BECAUSE YOU HAD BEEN DRINKING: NEVER
HOW OFTEN DURING THE LAST YEAR HAVE YOU HAD A FEELING OF GUILT OR REMORSE AFTER DRINKING: NEVER
HOW OFTEN DO YOU HAVE A DRINK CONTAINING ALCOHOL: 4 OR MORE TIMES A WEEK
HOW OFTEN DO YOU HAVE SIX OR MORE DRINKS ON ONE OCCASION: 1
HOW OFTEN DURING THE LAST YEAR HAVE YOU FOUND THAT YOU WERE NOT ABLE TO STOP DRINKING ONCE YOU HAD STARTED: NEVER
HOW OFTEN DURING THE LAST YEAR HAVE YOU FAILED TO DO WHAT WAS NORMALLY EXPECTED FROM YOU BECAUSE OF DRINKING: NEVER
HOW OFTEN DURING THE LAST YEAR HAVE YOU NEEDED AN ALCOHOLIC DRINK FIRST THING IN THE MORNING TO GET YOURSELF GOING AFTER A NIGHT OF HEAVY DRINKING: NEVER
HOW OFTEN DURING THE LAST YEAR HAVE YOU FOUND THAT YOU WERE NOT ABLE TO STOP DRINKING ONCE YOU HAD STARTED: NEVER
HOW MANY STANDARD DRINKS CONTAINING ALCOHOL DO YOU HAVE ON A TYPICAL DAY: 1 OR 2

## 2024-05-05 ASSESSMENT — PATIENT HEALTH QUESTIONNAIRE - PHQ9
1. LITTLE INTEREST OR PLEASURE IN DOING THINGS: NOT AT ALL
SUM OF ALL RESPONSES TO PHQ QUESTIONS 1-9: 0
1. LITTLE INTEREST OR PLEASURE IN DOING THINGS: NOT AT ALL
SUM OF ALL RESPONSES TO PHQ QUESTIONS 1-9: 0
SUM OF ALL RESPONSES TO PHQ QUESTIONS 1-9: 0
2. FEELING DOWN, DEPRESSED OR HOPELESS: NOT AT ALL
SUM OF ALL RESPONSES TO PHQ QUESTIONS 1-9: 0
SUM OF ALL RESPONSES TO PHQ9 QUESTIONS 1 & 2: 0
SUM OF ALL RESPONSES TO PHQ9 QUESTIONS 1 & 2: 0
2. FEELING DOWN, DEPRESSED OR HOPELESS: NOT AT ALL

## 2024-05-06 ENCOUNTER — OFFICE VISIT (OUTPATIENT)
Dept: FAMILY MEDICINE CLINIC | Age: 74
End: 2024-05-06
Payer: MEDICARE

## 2024-05-06 VITALS
DIASTOLIC BLOOD PRESSURE: 78 MMHG | OXYGEN SATURATION: 94 % | BODY MASS INDEX: 28.56 KG/M2 | SYSTOLIC BLOOD PRESSURE: 132 MMHG | HEIGHT: 70 IN | TEMPERATURE: 98.2 F | HEART RATE: 68 BPM | WEIGHT: 199.52 LBS

## 2024-05-06 VITALS
TEMPERATURE: 98.2 F | OXYGEN SATURATION: 98 % | DIASTOLIC BLOOD PRESSURE: 78 MMHG | HEIGHT: 64 IN | HEART RATE: 68 BPM | WEIGHT: 199.6 LBS | SYSTOLIC BLOOD PRESSURE: 130 MMHG | BODY MASS INDEX: 34.08 KG/M2

## 2024-05-06 DIAGNOSIS — E78.2 MIXED HYPERLIPIDEMIA: ICD-10-CM

## 2024-05-06 DIAGNOSIS — Z78.0 OSTEOPENIA AFTER MENOPAUSE: ICD-10-CM

## 2024-05-06 DIAGNOSIS — E03.9 ACQUIRED HYPOTHYROIDISM: ICD-10-CM

## 2024-05-06 DIAGNOSIS — Z00.00 MEDICARE ANNUAL WELLNESS VISIT, SUBSEQUENT: Primary | ICD-10-CM

## 2024-05-06 DIAGNOSIS — Z12.31 ENCOUNTER FOR SCREENING MAMMOGRAM FOR BREAST CANCER: ICD-10-CM

## 2024-05-06 DIAGNOSIS — M85.80 OSTEOPENIA AFTER MENOPAUSE: ICD-10-CM

## 2024-05-06 DIAGNOSIS — R42 VERTIGO: ICD-10-CM

## 2024-05-06 DIAGNOSIS — K21.9 GASTROESOPHAGEAL REFLUX DISEASE WITHOUT ESOPHAGITIS: ICD-10-CM

## 2024-05-06 DIAGNOSIS — I10 ESSENTIAL HYPERTENSION: Primary | ICD-10-CM

## 2024-05-06 PROCEDURE — G0439 PPPS, SUBSEQ VISIT: HCPCS | Performed by: FAMILY MEDICINE

## 2024-05-06 PROCEDURE — 3078F DIAST BP <80 MM HG: CPT | Performed by: FAMILY MEDICINE

## 2024-05-06 PROCEDURE — 1123F ACP DISCUSS/DSCN MKR DOCD: CPT | Performed by: FAMILY MEDICINE

## 2024-05-06 PROCEDURE — 3075F SYST BP GE 130 - 139MM HG: CPT | Performed by: FAMILY MEDICINE

## 2024-05-06 PROCEDURE — 99214 OFFICE O/P EST MOD 30 MIN: CPT | Performed by: FAMILY MEDICINE

## 2024-05-06 RX ORDER — PANTOPRAZOLE SODIUM 40 MG/1
40 TABLET, DELAYED RELEASE ORAL DAILY
Qty: 90 TABLET | Refills: 2 | Status: SHIPPED | OUTPATIENT
Start: 2024-05-06

## 2024-05-06 RX ORDER — ROSUVASTATIN CALCIUM 20 MG/1
TABLET, COATED ORAL
Qty: 24 TABLET | Refills: 2 | Status: SHIPPED | OUTPATIENT
Start: 2024-05-06

## 2024-05-06 RX ORDER — MECLIZINE HCL 12.5 MG/1
TABLET ORAL
Qty: 90 TABLET | Refills: 0 | Status: SHIPPED | OUTPATIENT
Start: 2024-05-06

## 2024-05-06 RX ORDER — VERAPAMIL HYDROCHLORIDE 120 MG/1
120 TABLET, FILM COATED ORAL 2 TIMES DAILY
Qty: 180 TABLET | Refills: 2 | Status: SHIPPED | OUTPATIENT
Start: 2024-05-06

## 2024-05-06 RX ORDER — PANTOPRAZOLE SODIUM 40 MG/1
40 TABLET, DELAYED RELEASE ORAL DAILY
Qty: 30 TABLET | Refills: 0 | Status: SHIPPED
Start: 2024-05-06 | End: 2024-05-06 | Stop reason: SDUPTHER

## 2024-05-06 RX ORDER — LEVOTHYROXINE SODIUM 0.07 MG/1
75 TABLET ORAL DAILY
Qty: 90 TABLET | Refills: 2 | Status: SHIPPED | OUTPATIENT
Start: 2024-05-06

## 2024-05-06 RX ORDER — ROSUVASTATIN CALCIUM 20 MG/1
TABLET, COATED ORAL
Qty: 8 TABLET | Refills: 0 | Status: SHIPPED
Start: 2024-05-06 | End: 2024-05-06 | Stop reason: SDUPTHER

## 2024-05-06 ASSESSMENT — ENCOUNTER SYMPTOMS
VOMITING: 0
EYE PAIN: 0
SORE THROAT: 0
SHORTNESS OF BREATH: 0
BACK PAIN: 0
CHEST TIGHTNESS: 0
NAUSEA: 0
SINUS PAIN: 0
DIARRHEA: 0
WHEEZING: 0
CONSTIPATION: 0
COUGH: 0
TROUBLE SWALLOWING: 0
ABDOMINAL PAIN: 0

## 2024-05-06 NOTE — PROGRESS NOTES
Medicare Annual Wellness Visit    Inga Fernandez is here for Medicare AWV    Assessment & Plan   Medicare annual wellness visit, subsequent    Recommendations for Preventive Services Due: see orders and patient instructions/AVS.  Recommended screening schedule for the next 5-10 years is provided to the patient in written form: see Patient Instructions/AVS.     Return for Medicare Annual Wellness Visit in 1 year.     Subjective   The following acute and/or chronic problems were also addressed today:  Screenings, vaccines and advance care plans    Patient's complete Health Risk Assessment and screening values have been reviewed and are found in Flowsheets. The following problems were reviewed today and where indicated follow up appointments were made and/or referrals ordered.                  Hearing Screen:       Interventions:  Patient declines any further evaluation or treatment               Objective   Vitals:    05/06/24 1347   BP: 132/78   Pulse: 68   Temp: 98.2 °F (36.8 °C)   SpO2: 94%   Weight: 90.5 kg (199 lb 8.3 oz)   Height: 1.778 m (5' 10\")      Body mass index is 28.63 kg/m².      General Appearance: alert and oriented to person, place and time, well developed and well- nourished, in no acute distress  Skin: warm and dry, no rash or erythema  Head: normocephalic and atraumatic  Eyes: pupils equal, round, and reactive to light, extraocular eye movements intact, conjunctivae normal  ENT: tympanic membrane, external ear and ear canal normal bilaterally, nose without deformity, nasal mucosa and turbinates normal without polyps  Neck: supple and non-tender without mass, no thyromegaly or thyroid nodules, no cervical lymphadenopathy  Pulmonary/Chest: clear to auscultation bilaterally- no wheezes, rales or rhonchi, normal air movement, no respiratory distress  Cardiovascular: normal rate, regular rhythm, normal S1 and S2, no murmurs, rubs, clicks, or gallops, distal pulses intact, no carotid bruits  Abdomen:

## 2024-05-06 NOTE — PROGRESS NOTES
great  pantoprazoel working with no issues  Orders:  -     Discontinue: pantoprazole (PROTONIX) 40 MG tablet; Take 1 tablet by mouth daily  -     pantoprazole (PROTONIX) 40 MG tablet; Take 1 tablet by mouth daily    Acquired hypothyroidism  Comments:  labs today continue levothyroxine  continue same doses  doing well-possible goiter thyroid on ct, check US  Orders:  -     levothyroxine (SYNTHROID) 75 MCG tablet; Take 1 tablet by mouth daily  -     TSH; Future    Vertigo  Comments:  she will look into [facility] neurology, she has been there previously  she will think about PT for vestibular rehab, recommend it, hearing test done  Orders:  -     meclizine (ANTIVERT) 12.5 MG tablet; take 1 tablet by mouth three times a day if needed for dizziness    Encounter for screening mammogram for breast cancer  Comments:  ordered for august, due for dexa as well,  Orders:  -     JIHAN VEE DIGITAL SCREEN BILATERAL PER PROTOCOL; Future    Osteopenia after menopause  Comments:  did osteostrong for 3 mo, but got expensive  now doing exercises and stretching at home  dexa due in august  Orders:  -     DEXA BONE DENSITY AXIAL SKELETON; Future        I independently reviewed and updated the chief complaint, HPI, past medical and surgical history, medications, allergies and ROS as entered by the LPN.      Seen by:  Lucy Boyce DO

## 2024-05-29 DIAGNOSIS — K58.9 IRRITABLE BOWEL SYNDROME, UNSPECIFIED TYPE: ICD-10-CM

## 2024-05-29 DIAGNOSIS — R19.7 DIARRHEA, UNSPECIFIED TYPE: Primary | ICD-10-CM

## 2024-06-04 DIAGNOSIS — R19.4 CHANGE IN BOWEL HABITS: ICD-10-CM

## 2024-06-04 DIAGNOSIS — R19.7 DIARRHEA, UNSPECIFIED TYPE: ICD-10-CM

## 2024-06-04 DIAGNOSIS — K21.9 GASTROESOPHAGEAL REFLUX DISEASE WITHOUT ESOPHAGITIS: Primary | ICD-10-CM

## 2024-10-30 DIAGNOSIS — I10 ESSENTIAL HYPERTENSION: ICD-10-CM

## 2024-10-30 DIAGNOSIS — E78.2 MIXED HYPERLIPIDEMIA: ICD-10-CM

## 2024-10-30 DIAGNOSIS — E03.9 ACQUIRED HYPOTHYROIDISM: ICD-10-CM

## 2024-10-30 LAB
ALBUMIN: 4.2 G/DL (ref 3.5–5.2)
ALP BLD-CCNC: 114 U/L (ref 35–104)
ALT SERPL-CCNC: 12 U/L (ref 0–32)
ANION GAP SERPL CALCULATED.3IONS-SCNC: 12 MMOL/L (ref 7–16)
AST SERPL-CCNC: 17 U/L (ref 0–31)
BASOPHILS ABSOLUTE: 0.03 K/UL (ref 0–0.2)
BASOPHILS RELATIVE PERCENT: 1 % (ref 0–2)
BILIRUB SERPL-MCNC: 0.6 MG/DL (ref 0–1.2)
BUN BLDV-MCNC: 14 MG/DL (ref 6–23)
CALCIUM SERPL-MCNC: 9.4 MG/DL (ref 8.6–10.2)
CHLORIDE BLD-SCNC: 105 MMOL/L (ref 98–107)
CHOLESTEROL, TOTAL: 181 MG/DL
CO2: 26 MMOL/L (ref 22–29)
CREAT SERPL-MCNC: 0.8 MG/DL (ref 0.5–1)
EOSINOPHILS ABSOLUTE: 0.41 K/UL (ref 0.05–0.5)
EOSINOPHILS RELATIVE PERCENT: 9 % (ref 0–6)
GFR, ESTIMATED: 77 ML/MIN/1.73M2
GLUCOSE BLD-MCNC: 95 MG/DL (ref 74–99)
HCT VFR BLD CALC: 40 % (ref 34–48)
HDLC SERPL-MCNC: 67 MG/DL
HEMOGLOBIN: 12.7 G/DL (ref 11.5–15.5)
IMMATURE GRANULOCYTES %: 1 % (ref 0–5)
IMMATURE GRANULOCYTES ABSOLUTE: <0.03 K/UL (ref 0–0.58)
LDL CHOLESTEROL: 101 MG/DL
LYMPHOCYTES ABSOLUTE: 1.21 K/UL (ref 1.5–4)
LYMPHOCYTES RELATIVE PERCENT: 27 % (ref 20–42)
MCH RBC QN AUTO: 29.5 PG (ref 26–35)
MCHC RBC AUTO-ENTMCNC: 31.8 G/DL (ref 32–34.5)
MCV RBC AUTO: 93 FL (ref 80–99.9)
MONOCYTES ABSOLUTE: 0.39 K/UL (ref 0.1–0.95)
MONOCYTES RELATIVE PERCENT: 9 % (ref 2–12)
NEUTROPHILS ABSOLUTE: 2.38 K/UL (ref 1.8–7.3)
NEUTROPHILS RELATIVE PERCENT: 54 % (ref 43–80)
PDW BLD-RTO: 13.5 % (ref 11.5–15)
PLATELET # BLD: 126 K/UL (ref 130–450)
PMV BLD AUTO: 10.9 FL (ref 7–12)
POTASSIUM SERPL-SCNC: 4.3 MMOL/L (ref 3.5–5)
RBC # BLD: 4.3 M/UL (ref 3.5–5.5)
SODIUM BLD-SCNC: 143 MMOL/L (ref 132–146)
TOTAL PROTEIN: 6.3 G/DL (ref 6.4–8.3)
TRIGL SERPL-MCNC: 66 MG/DL
TSH SERPL DL<=0.05 MIU/L-ACNC: 2.92 UIU/ML (ref 0.27–4.2)
VLDLC SERPL CALC-MCNC: 13 MG/DL
WBC # BLD: 4.4 K/UL (ref 4.5–11.5)

## 2024-10-30 ASSESSMENT — PATIENT HEALTH QUESTIONNAIRE - PHQ9
SUM OF ALL RESPONSES TO PHQ9 QUESTIONS 1 & 2: 0
SUM OF ALL RESPONSES TO PHQ QUESTIONS 1-9: 0
1. LITTLE INTEREST OR PLEASURE IN DOING THINGS: NOT AT ALL
2. FEELING DOWN, DEPRESSED OR HOPELESS: NOT AT ALL
2. FEELING DOWN, DEPRESSED OR HOPELESS: NOT AT ALL
SUM OF ALL RESPONSES TO PHQ QUESTIONS 1-9: 0
1. LITTLE INTEREST OR PLEASURE IN DOING THINGS: NOT AT ALL
SUM OF ALL RESPONSES TO PHQ QUESTIONS 1-9: 0
SUM OF ALL RESPONSES TO PHQ9 QUESTIONS 1 & 2: 0
SUM OF ALL RESPONSES TO PHQ QUESTIONS 1-9: 0

## 2024-11-02 DIAGNOSIS — I10 ESSENTIAL HYPERTENSION: ICD-10-CM

## 2024-11-03 SDOH — ECONOMIC STABILITY: FOOD INSECURITY: WITHIN THE PAST 12 MONTHS, THE FOOD YOU BOUGHT JUST DIDN'T LAST AND YOU DIDN'T HAVE MONEY TO GET MORE.: NEVER TRUE

## 2024-11-03 SDOH — ECONOMIC STABILITY: FOOD INSECURITY: WITHIN THE PAST 12 MONTHS, YOU WORRIED THAT YOUR FOOD WOULD RUN OUT BEFORE YOU GOT MONEY TO BUY MORE.: NEVER TRUE

## 2024-11-03 SDOH — ECONOMIC STABILITY: INCOME INSECURITY: HOW HARD IS IT FOR YOU TO PAY FOR THE VERY BASICS LIKE FOOD, HOUSING, MEDICAL CARE, AND HEATING?: NOT VERY HARD

## 2024-11-04 ENCOUNTER — OFFICE VISIT (OUTPATIENT)
Dept: FAMILY MEDICINE CLINIC | Age: 74
End: 2024-11-04

## 2024-11-04 VITALS
HEIGHT: 64 IN | TEMPERATURE: 97.9 F | OXYGEN SATURATION: 98 % | HEART RATE: 70 BPM | DIASTOLIC BLOOD PRESSURE: 84 MMHG | SYSTOLIC BLOOD PRESSURE: 128 MMHG | BODY MASS INDEX: 31.65 KG/M2 | WEIGHT: 185.4 LBS

## 2024-11-04 DIAGNOSIS — Z23 NEED FOR VACCINATION: ICD-10-CM

## 2024-11-04 DIAGNOSIS — M81.0 AGE-RELATED OSTEOPOROSIS WITHOUT CURRENT PATHOLOGICAL FRACTURE: ICD-10-CM

## 2024-11-04 DIAGNOSIS — E78.2 MIXED HYPERLIPIDEMIA: ICD-10-CM

## 2024-11-04 DIAGNOSIS — I10 ESSENTIAL HYPERTENSION: Primary | ICD-10-CM

## 2024-11-04 DIAGNOSIS — K21.9 GASTROESOPHAGEAL REFLUX DISEASE WITHOUT ESOPHAGITIS: ICD-10-CM

## 2024-11-04 DIAGNOSIS — R42 VERTIGO: ICD-10-CM

## 2024-11-04 DIAGNOSIS — E03.9 ACQUIRED HYPOTHYROIDISM: ICD-10-CM

## 2024-11-04 RX ORDER — LEVOTHYROXINE SODIUM 75 UG/1
75 TABLET ORAL DAILY
Qty: 90 TABLET | Refills: 2 | Status: SHIPPED | OUTPATIENT
Start: 2024-11-04

## 2024-11-04 RX ORDER — ROSUVASTATIN CALCIUM 20 MG/1
TABLET, COATED ORAL
Qty: 45 TABLET | Refills: 2 | Status: SHIPPED | OUTPATIENT
Start: 2024-11-04

## 2024-11-04 RX ORDER — VERAPAMIL HYDROCHLORIDE 120 MG/1
120 TABLET ORAL 2 TIMES DAILY
Qty: 180 TABLET | Refills: 2 | Status: SHIPPED | OUTPATIENT
Start: 2024-11-04

## 2024-11-04 RX ORDER — MECLIZINE HCL 12.5 MG 12.5 MG/1
TABLET ORAL
Qty: 90 TABLET | Refills: 0 | Status: SHIPPED | OUTPATIENT
Start: 2024-11-04

## 2024-11-04 RX ORDER — VERAPAMIL HYDROCHLORIDE 120 MG/1
120 TABLET ORAL 2 TIMES DAILY
Qty: 180 TABLET | Refills: 2 | OUTPATIENT
Start: 2024-11-04

## 2024-11-04 RX ORDER — PANTOPRAZOLE SODIUM 40 MG/1
40 TABLET, DELAYED RELEASE ORAL DAILY
Qty: 90 TABLET | Refills: 2 | Status: SHIPPED | OUTPATIENT
Start: 2024-11-04

## 2024-11-04 ASSESSMENT — ENCOUNTER SYMPTOMS
EYE PAIN: 0
COUGH: 0
BACK PAIN: 0
CONSTIPATION: 0
SINUS PAIN: 0
NAUSEA: 0
VOMITING: 0
ABDOMINAL PAIN: 0
CHEST TIGHTNESS: 0
DIARRHEA: 0
TROUBLE SWALLOWING: 0
SORE THROAT: 0
WHEEZING: 0
SHORTNESS OF BREATH: 0

## 2024-11-04 NOTE — PROGRESS NOTES
70   Temp 97.9 °F (36.6 °C)   Ht 1.626 m (5' 4\")   Wt 84.1 kg (185 lb 6.4 oz)   SpO2 98%   BMI 31.82 kg/m²     EXAM:   Physical Exam  Vitals and nursing note reviewed.   Constitutional:       General: She is not in acute distress.     Appearance: She is well-developed. She is not ill-appearing or toxic-appearing.   HENT:      Head: Normocephalic and atraumatic.      Right Ear: Tympanic membrane normal.      Left Ear: Tympanic membrane normal.      Nose: No congestion.   Eyes:      Pupils: Pupils are equal, round, and reactive to light.   Cardiovascular:      Rate and Rhythm: Normal rate and regular rhythm.   Pulmonary:      Effort: Pulmonary effort is normal. No respiratory distress.      Breath sounds: Normal breath sounds. No wheezing or rhonchi.   Musculoskeletal:      Cervical back: Normal range of motion.      Comments: Gait steady   Skin:     General: Skin is warm and dry.   Neurological:      Mental Status: She is alert and oriented to person, place, and time.   Psychiatric:         Mood and Affect: Mood normal.         Thought Content: Thought content normal.          Inga was seen today for hypertension, hypothyroidism and hyperlipidemia.    Diagnoses and all orders for this visit:    Essential hypertension  Comments:  blood pressures much better at home 120/70's mostly  no higher readings, feeling much better  stress test reviewed again,  continue verapamil  120mg twice a day  Orders:  -     verapamil (CALAN) 120 MG tablet; Take 1 tablet by mouth 2 times daily  -     CBC with Auto Differential; Future  -     Comprehensive Metabolic Panel; Future    Acquired hypothyroidism  Comments:  labs today continue levothyroxine  continue same doses  doing well-possible goiter thyroid on ct, stable US  Orders:  -     levothyroxine (SYNTHROID) 75 MCG tablet; Take 1 tablet by mouth daily  -     TSH; Future    Vertigo  Comments:  she will look into [facility] neurology, she has been there previously  she will think about

## 2025-06-24 ENCOUNTER — APPOINTMENT (OUTPATIENT)
Dept: ULTRASOUND IMAGING | Age: 75
End: 2025-06-24
Payer: MEDICARE

## 2025-06-24 ENCOUNTER — HOSPITAL ENCOUNTER (EMERGENCY)
Age: 75
Discharge: HOME | End: 2025-06-24
Attending: STUDENT IN AN ORGANIZED HEALTH CARE EDUCATION/TRAINING PROGRAM
Payer: MEDICARE

## 2025-06-24 VITALS
SYSTOLIC BLOOD PRESSURE: 156 MMHG | OXYGEN SATURATION: 97 % | DIASTOLIC BLOOD PRESSURE: 76 MMHG | RESPIRATION RATE: 16 BRPM | TEMPERATURE: 98.2 F | HEART RATE: 92 BPM | BODY MASS INDEX: 36.8 KG/M2 | HEIGHT: 62 IN | WEIGHT: 200 LBS

## 2025-06-24 DIAGNOSIS — T14.8XXA ABRASION: ICD-10-CM
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
## 2025-06-24 DIAGNOSIS — S80.861A NONVENOMOUS INSECT BITE OF RIGHT LOWER EXTREMITY, INITIAL ENCOUNTER: Primary | ICD-10-CM

## 2025-06-24 PROCEDURE — 93971 EXTREMITY STUDY: CPT

## 2025-06-24 PROCEDURE — 99284 EMERGENCY DEPT VISIT MOD MDM: CPT

## 2025-06-24 PROCEDURE — 6370000000 HC RX 637 (ALT 250 FOR IP): Performed by: STUDENT IN AN ORGANIZED HEALTH CARE EDUCATION/TRAINING PROGRAM

## 2025-06-24 RX ORDER — DIPHENHYDRAMINE HCL 25 MG
25 TABLET ORAL ONCE
Status: COMPLETED | OUTPATIENT
Start: 2025-06-24 | End: 2025-06-24

## 2025-06-24 RX ORDER — ACETAMINOPHEN 500 MG
1000 TABLET ORAL ONCE
Status: COMPLETED | OUTPATIENT
Start: 2025-06-24 | End: 2025-06-24

## 2025-06-24 RX ADMIN — ACETAMINOPHEN 1000 MG: 500 TABLET ORAL at 20:31

## 2025-06-24 RX ADMIN — DIPHENHYDRAMINE HCL 25 MG: 25 TABLET ORAL at 20:31

## 2025-06-24 ASSESSMENT — LIFESTYLE VARIABLES
HOW OFTEN DO YOU HAVE A DRINK CONTAINING ALCOHOL: 4 OR MORE TIMES A WEEK
HOW MANY STANDARD DRINKS CONTAINING ALCOHOL DO YOU HAVE ON A TYPICAL DAY: 1 OR 2

## 2025-06-24 ASSESSMENT — PAIN - FUNCTIONAL ASSESSMENT: PAIN_FUNCTIONAL_ASSESSMENT: NONE - DENIES PAIN

## 2025-06-24 ASSESSMENT — PAIN SCALES - GENERAL: PAINLEVEL_OUTOF10: 0

## 2025-06-25 NOTE — DISCHARGE INSTRUCTIONS
Use Motrin Tylenol for pain control.  Use Benadryl as needed for itching.  Keep the wound clean and dry.  Follow-up with family doctor.

## 2025-06-25 NOTE — ED PROVIDER NOTES
[facility] EMERGENCY DEPARTMENT  EMERGENCY DEPARTMENT ENCOUNTER        Pt Name: Inga Fernandez  MRN: [id number]  Birthdate 1950  Date of evaluation: 6/24/2025  Provider: Miriam Cuellar DO  PCP: Lucy Boyce DO  Note Started: 8:15 PM EDT 6/24/25    CHIEF COMPLAINT       Chief Complaint   Patient presents with    Leg Injury     Sent from urgent care concerns for blood clot        HISTORY OF PRESENT ILLNESS: 1 or more Elements   History From: patient    Limitations to history : None    Inga Fernandez is a 74 y.o. female with a history of chronic pain syndrome, hypothyroidism, hypertension, hyperlipidemia presenting to the emergency department complaining of leg injury.  Patient states that she hit the anterior part of her right leg off of a garden tool several days ago.  She states that has been healing.  She confirms that her tetanus shot is up-to-date.  She states that today she was working outside and she thinks that she got bit by an insect on her right ankle.  States it has been itching all day.  She not take anything for her symptoms prior to coming here.  She went to urgent care and they sent her here to rule out a blood clot because they thought her right leg was more swollen than her left leg.  She states that she does not have any history of blood clots.  Patient denies any fevers, chills, chest pain, shortness of breath, lightheadedness, dizziness, syncope, numbness, tingling, unilateral weakness, history of diabetes, recent hospitalization, recent loss, or other acute symptoms or concerns.    Nursing Notes were all reviewed and agreed with or any disagreements were addressed in the HPI.    REVIEW OF SYSTEMS :      Review of Systems    Positives and Pertinent negatives as per HPI.     SURGICAL HISTORY     Past Surgical History:   Procedure Laterality Date    APPENDECTOMY      CAROTID ENDARTERECTOMY      HEMORRHOID SURGERY      HYSTERECTOMY (CERVIX